# Patient Record
Sex: MALE | Race: WHITE | NOT HISPANIC OR LATINO | Employment: OTHER | URBAN - METROPOLITAN AREA
[De-identification: names, ages, dates, MRNs, and addresses within clinical notes are randomized per-mention and may not be internally consistent; named-entity substitution may affect disease eponyms.]

---

## 2021-02-08 ENCOUNTER — OFFICE VISIT (OUTPATIENT)
Dept: GASTROENTEROLOGY | Facility: CLINIC | Age: 71
End: 2021-02-08
Payer: OTHER GOVERNMENT

## 2021-02-08 ENCOUNTER — TELEPHONE (OUTPATIENT)
Dept: GASTROENTEROLOGY | Facility: CLINIC | Age: 71
End: 2021-02-08

## 2021-02-08 VITALS
DIASTOLIC BLOOD PRESSURE: 90 MMHG | WEIGHT: 314.13 LBS | HEART RATE: 91 BPM | HEIGHT: 71 IN | SYSTOLIC BLOOD PRESSURE: 134 MMHG | OXYGEN SATURATION: 97 % | BODY MASS INDEX: 43.98 KG/M2

## 2021-02-08 DIAGNOSIS — Z86.010 HISTORY OF COLON POLYPS: ICD-10-CM

## 2021-02-08 DIAGNOSIS — Z12.11 COLON CANCER SCREENING: Primary | ICD-10-CM

## 2021-02-08 DIAGNOSIS — Z80.0 FAMILY HISTORY OF COLON CANCER: ICD-10-CM

## 2021-02-08 PROCEDURE — 99999 PR PBB SHADOW E&M-NEW PATIENT-LVL IV: ICD-10-PCS | Mod: PBBFAC,,, | Performed by: NURSE PRACTITIONER

## 2021-02-08 PROCEDURE — 99999 PR PBB SHADOW E&M-NEW PATIENT-LVL IV: CPT | Mod: PBBFAC,,, | Performed by: NURSE PRACTITIONER

## 2021-02-08 PROCEDURE — 99499 UNLISTED E&M SERVICE: CPT | Mod: S$PBB,,, | Performed by: NURSE PRACTITIONER

## 2021-02-08 PROCEDURE — 99499 NO LOS: ICD-10-PCS | Mod: S$PBB,,, | Performed by: NURSE PRACTITIONER

## 2021-02-08 PROCEDURE — 99204 OFFICE O/P NEW MOD 45 MIN: CPT | Mod: PBBFAC | Performed by: NURSE PRACTITIONER

## 2021-02-08 RX ORDER — LOSARTAN POTASSIUM 100 MG/1
100 TABLET ORAL
COMMUNITY
End: 2022-06-30 | Stop reason: SDUPTHER

## 2021-02-08 RX ORDER — ACETAMINOPHEN 500 MG
2 TABLET ORAL
COMMUNITY
End: 2021-02-08 | Stop reason: SDUPTHER

## 2021-02-08 RX ORDER — GABAPENTIN 600 MG/1
600 TABLET ORAL
COMMUNITY

## 2021-02-12 ENCOUNTER — TELEPHONE (OUTPATIENT)
Dept: PREADMISSION TESTING | Facility: HOSPITAL | Age: 71
End: 2021-02-12

## 2021-02-18 ENCOUNTER — ANESTHESIA EVENT (OUTPATIENT)
Dept: ENDOSCOPY | Facility: HOSPITAL | Age: 71
End: 2021-02-18
Payer: OTHER GOVERNMENT

## 2021-02-19 ENCOUNTER — LAB VISIT (OUTPATIENT)
Dept: OTOLARYNGOLOGY | Facility: CLINIC | Age: 71
End: 2021-02-19
Payer: OTHER GOVERNMENT

## 2021-02-19 DIAGNOSIS — Z12.11 COLON CANCER SCREENING: ICD-10-CM

## 2021-02-19 DIAGNOSIS — Z80.0 FAMILY HISTORY OF COLON CANCER: ICD-10-CM

## 2021-02-19 DIAGNOSIS — Z86.010 HISTORY OF COLON POLYPS: ICD-10-CM

## 2021-02-19 PROCEDURE — U0003 INFECTIOUS AGENT DETECTION BY NUCLEIC ACID (DNA OR RNA); SEVERE ACUTE RESPIRATORY SYNDROME CORONAVIRUS 2 (SARS-COV-2) (CORONAVIRUS DISEASE [COVID-19]), AMPLIFIED PROBE TECHNIQUE, MAKING USE OF HIGH THROUGHPUT TECHNOLOGIES AS DESCRIBED BY CMS-2020-01-R: HCPCS

## 2021-02-19 PROCEDURE — U0005 INFEC AGEN DETEC AMPLI PROBE: HCPCS

## 2021-02-20 LAB — SARS-COV-2 RNA RESP QL NAA+PROBE: NOT DETECTED

## 2021-02-22 ENCOUNTER — ANESTHESIA (OUTPATIENT)
Dept: ENDOSCOPY | Facility: HOSPITAL | Age: 71
End: 2021-02-22
Payer: OTHER GOVERNMENT

## 2021-02-22 ENCOUNTER — OFFICE VISIT (OUTPATIENT)
Dept: CARDIOLOGY | Facility: CLINIC | Age: 71
End: 2021-02-22
Attending: INTERNAL MEDICINE
Payer: OTHER GOVERNMENT

## 2021-02-22 ENCOUNTER — HOSPITAL ENCOUNTER (OUTPATIENT)
Facility: HOSPITAL | Age: 71
Discharge: HOME | End: 2021-02-22
Attending: INTERNAL MEDICINE | Admitting: INTERNAL MEDICINE
Payer: OTHER GOVERNMENT

## 2021-02-22 VITALS
TEMPERATURE: 98 F | HEIGHT: 71 IN | RESPIRATION RATE: 16 BRPM | HEART RATE: 76 BPM | SYSTOLIC BLOOD PRESSURE: 151 MMHG | BODY MASS INDEX: 42.28 KG/M2 | DIASTOLIC BLOOD PRESSURE: 91 MMHG | OXYGEN SATURATION: 97 % | WEIGHT: 302 LBS

## 2021-02-22 VITALS
BODY MASS INDEX: 43.82 KG/M2 | HEART RATE: 71 BPM | DIASTOLIC BLOOD PRESSURE: 86 MMHG | OXYGEN SATURATION: 97 % | WEIGHT: 314.13 LBS | SYSTOLIC BLOOD PRESSURE: 128 MMHG

## 2021-02-22 DIAGNOSIS — M79.89 LOCALIZED SWELLING OF BOTH LOWER EXTREMITIES: ICD-10-CM

## 2021-02-22 DIAGNOSIS — R06.01 ORTHOPNEA: ICD-10-CM

## 2021-02-22 DIAGNOSIS — Z86.010 HX OF COLONIC POLYPS: Primary | ICD-10-CM

## 2021-02-22 DIAGNOSIS — E66.01 MORBID OBESITY: ICD-10-CM

## 2021-02-22 DIAGNOSIS — I10 HYPERTENSION, UNSPECIFIED TYPE: ICD-10-CM

## 2021-02-22 DIAGNOSIS — E78.5 HYPERLIPIDEMIA, UNSPECIFIED HYPERLIPIDEMIA TYPE: ICD-10-CM

## 2021-02-22 DIAGNOSIS — R00.0 TACHYCARDIA: ICD-10-CM

## 2021-02-22 DIAGNOSIS — I48.91 ATRIAL FIBRILLATION, UNSPECIFIED TYPE: Primary | ICD-10-CM

## 2021-02-22 DIAGNOSIS — G47.33 OSA (OBSTRUCTIVE SLEEP APNEA): ICD-10-CM

## 2021-02-22 PROCEDURE — 99999 PR PBB SHADOW E&M-EST. PATIENT-LVL III: CPT | Mod: PBBFAC,,, | Performed by: INTERNAL MEDICINE

## 2021-02-22 PROCEDURE — G0105 COLORECTAL SCRN; HI RISK IND: ICD-10-PCS | Mod: ,,, | Performed by: INTERNAL MEDICINE

## 2021-02-22 PROCEDURE — 00812 ANES LWR INTST SCR COLSC: CPT | Performed by: INTERNAL MEDICINE

## 2021-02-22 PROCEDURE — 93005 ELECTROCARDIOGRAM TRACING: CPT

## 2021-02-22 PROCEDURE — 99999 PR PBB SHADOW E&M-EST. PATIENT-LVL III: ICD-10-PCS | Mod: PBBFAC,,, | Performed by: INTERNAL MEDICINE

## 2021-02-22 PROCEDURE — 93010 EKG 12-LEAD: ICD-10-PCS | Mod: ,,, | Performed by: INTERNAL MEDICINE

## 2021-02-22 PROCEDURE — G0105 COLORECTAL SCRN; HI RISK IND: HCPCS | Performed by: INTERNAL MEDICINE

## 2021-02-22 PROCEDURE — G0105 COLORECTAL SCRN; HI RISK IND: HCPCS | Mod: ,,, | Performed by: INTERNAL MEDICINE

## 2021-02-22 PROCEDURE — 63600175 PHARM REV CODE 636 W HCPCS: Performed by: NURSE ANESTHETIST, CERTIFIED REGISTERED

## 2021-02-22 PROCEDURE — 37000008 HC ANESTHESIA 1ST 15 MINUTES: Performed by: INTERNAL MEDICINE

## 2021-02-22 PROCEDURE — 93010 ELECTROCARDIOGRAM REPORT: CPT | Mod: ,,, | Performed by: INTERNAL MEDICINE

## 2021-02-22 PROCEDURE — 37000009 HC ANESTHESIA EA ADD 15 MINS: Performed by: INTERNAL MEDICINE

## 2021-02-22 PROCEDURE — D9220A PRA ANESTHESIA: Mod: ,,, | Performed by: ANESTHESIOLOGY

## 2021-02-22 PROCEDURE — 25000003 PHARM REV CODE 250: Performed by: NURSE ANESTHETIST, CERTIFIED REGISTERED

## 2021-02-22 PROCEDURE — 99204 PR OFFICE/OUTPT VISIT, NEW, LEVL IV, 45-59 MIN: ICD-10-PCS | Mod: S$PBB,,, | Performed by: INTERNAL MEDICINE

## 2021-02-22 PROCEDURE — D9220A PRA ANESTHESIA: ICD-10-PCS | Mod: ,,, | Performed by: ANESTHESIOLOGY

## 2021-02-22 PROCEDURE — 99204 OFFICE O/P NEW MOD 45 MIN: CPT | Mod: S$PBB,,, | Performed by: INTERNAL MEDICINE

## 2021-02-22 PROCEDURE — 99213 OFFICE O/P EST LOW 20 MIN: CPT | Mod: PBBFAC,25 | Performed by: INTERNAL MEDICINE

## 2021-02-22 RX ORDER — LIDOCAINE HYDROCHLORIDE 20 MG/ML
INJECTION, SOLUTION EPIDURAL; INFILTRATION; INTRACAUDAL; PERINEURAL
Status: DISCONTINUED | OUTPATIENT
Start: 2021-02-22 | End: 2021-02-22

## 2021-02-22 RX ORDER — METOPROLOL SUCCINATE 25 MG/1
25 TABLET, EXTENDED RELEASE ORAL DAILY
Qty: 30 TABLET | Refills: 11 | Status: SHIPPED | OUTPATIENT
Start: 2021-02-22 | End: 2021-02-25 | Stop reason: SDUPTHER

## 2021-02-22 RX ORDER — PROPOFOL 10 MG/ML
VIAL (ML) INTRAVENOUS
Status: DISCONTINUED | OUTPATIENT
Start: 2021-02-22 | End: 2021-02-22

## 2021-02-22 RX ORDER — SODIUM CHLORIDE, SODIUM LACTATE, POTASSIUM CHLORIDE, CALCIUM CHLORIDE 600; 310; 30; 20 MG/100ML; MG/100ML; MG/100ML; MG/100ML
INJECTION, SOLUTION INTRAVENOUS CONTINUOUS PRN
Status: DISCONTINUED | OUTPATIENT
Start: 2021-02-22 | End: 2021-02-22

## 2021-02-22 RX ORDER — FUROSEMIDE 20 MG/1
20 TABLET ORAL 2 TIMES DAILY
Qty: 60 TABLET | Refills: 11 | Status: SHIPPED | OUTPATIENT
Start: 2021-02-22 | End: 2021-02-25 | Stop reason: SDUPTHER

## 2021-02-22 RX ORDER — SODIUM CHLORIDE, SODIUM LACTATE, POTASSIUM CHLORIDE, CALCIUM CHLORIDE 600; 310; 30; 20 MG/100ML; MG/100ML; MG/100ML; MG/100ML
INJECTION, SOLUTION INTRAVENOUS CONTINUOUS
Status: DISCONTINUED | OUTPATIENT
Start: 2021-02-22 | End: 2021-02-22 | Stop reason: HOSPADM

## 2021-02-22 RX ORDER — SODIUM CHLORIDE 0.9 % (FLUSH) 0.9 %
10 SYRINGE (ML) INJECTION
Status: DISCONTINUED | OUTPATIENT
Start: 2021-02-22 | End: 2021-02-22 | Stop reason: HOSPADM

## 2021-02-22 RX ADMIN — LIDOCAINE HYDROCHLORIDE 100 MG: 20 INJECTION, SOLUTION EPIDURAL; INFILTRATION; INTRACAUDAL; PERINEURAL at 11:02

## 2021-02-22 RX ADMIN — PROPOFOL 160 MG: 10 INJECTION, EMULSION INTRAVENOUS at 11:02

## 2021-02-22 RX ADMIN — PROPOFOL 40 MG: 10 INJECTION, EMULSION INTRAVENOUS at 11:02

## 2021-02-22 RX ADMIN — PROPOFOL 50 MG: 10 INJECTION, EMULSION INTRAVENOUS at 11:02

## 2021-02-22 RX ADMIN — SODIUM CHLORIDE, SODIUM LACTATE, POTASSIUM CHLORIDE, AND CALCIUM CHLORIDE: 600; 310; 30; 20 INJECTION, SOLUTION INTRAVENOUS at 11:02

## 2021-02-23 ENCOUNTER — PATIENT MESSAGE (OUTPATIENT)
Dept: CARDIOLOGY | Facility: CLINIC | Age: 71
End: 2021-02-23

## 2021-02-25 DIAGNOSIS — I10 HYPERTENSION, UNSPECIFIED TYPE: ICD-10-CM

## 2021-02-25 DIAGNOSIS — M79.89 LOCALIZED SWELLING OF BOTH LOWER EXTREMITIES: ICD-10-CM

## 2021-02-25 DIAGNOSIS — I48.91 ATRIAL FIBRILLATION, UNSPECIFIED TYPE: ICD-10-CM

## 2021-02-25 DIAGNOSIS — R06.01 ORTHOPNEA: ICD-10-CM

## 2021-02-25 RX ORDER — METOPROLOL SUCCINATE 25 MG/1
25 TABLET, EXTENDED RELEASE ORAL DAILY
Qty: 30 TABLET | Refills: 11 | Status: CANCELLED | OUTPATIENT
Start: 2021-02-25 | End: 2022-02-25

## 2021-02-25 RX ORDER — FUROSEMIDE 20 MG/1
20 TABLET ORAL 2 TIMES DAILY
Qty: 60 TABLET | Refills: 11 | Status: CANCELLED | OUTPATIENT
Start: 2021-02-25 | End: 2022-02-25

## 2021-02-25 RX ORDER — FUROSEMIDE 20 MG/1
20 TABLET ORAL 2 TIMES DAILY
Qty: 60 TABLET | Refills: 11 | Status: SHIPPED | OUTPATIENT
Start: 2021-02-25 | End: 2021-09-08 | Stop reason: SDUPTHER

## 2021-02-25 RX ORDER — METOPROLOL SUCCINATE 25 MG/1
25 TABLET, EXTENDED RELEASE ORAL DAILY
Qty: 30 TABLET | Refills: 11 | Status: SHIPPED | OUTPATIENT
Start: 2021-02-25 | End: 2021-09-08 | Stop reason: SDUPTHER

## 2021-03-05 ENCOUNTER — TELEPHONE (OUTPATIENT)
Dept: CARDIOLOGY | Facility: CLINIC | Age: 71
End: 2021-03-05

## 2021-03-15 ENCOUNTER — HOSPITAL ENCOUNTER (OUTPATIENT)
Dept: CARDIOLOGY | Facility: HOSPITAL | Age: 71
Discharge: HOME | End: 2021-03-15
Attending: INTERNAL MEDICINE
Payer: OTHER GOVERNMENT

## 2021-03-15 VITALS — WEIGHT: 314 LBS | BODY MASS INDEX: 43.96 KG/M2 | HEIGHT: 71 IN

## 2021-03-15 DIAGNOSIS — R06.01 ORTHOPNEA: ICD-10-CM

## 2021-03-15 DIAGNOSIS — M79.89 LOCALIZED SWELLING OF BOTH LOWER EXTREMITIES: ICD-10-CM

## 2021-03-15 DIAGNOSIS — I48.91 ATRIAL FIBRILLATION, UNSPECIFIED TYPE: ICD-10-CM

## 2021-03-15 LAB
AORTIC ROOT ANNULUS: 3.65 CM
AV INDEX (PROSTH): 0.88
AV MEAN GRADIENT: 2 MMHG
AV PEAK GRADIENT: 4 MMHG
AV VALVE AREA: 2.77 CM2
AV VELOCITY RATIO: 0.89
BSA FOR ECHO PROCEDURE: 2.67 M2
CV ECHO LV RWT: 0.57 CM
DOP CALC AO PEAK VEL: 1 M/S
DOP CALC AO VTI: 18.53 CM
DOP CALC LVOT AREA: 3.1 CM2
DOP CALC LVOT DIAMETER: 2 CM
DOP CALC LVOT PEAK VEL: 0.89 M/S
DOP CALC LVOT STROKE VOLUME: 51.37 CM3
DOP CALC RVOT PEAK VEL: 0.63 M/S
DOP CALC RVOT VTI: 12.33 CM
DOP CALCLVOT PEAK VEL VTI: 16.36 CM
E WAVE DECELERATION TIME: 168.81 MSEC
ECHO LV POSTERIOR WALL: 1.41 CM (ref 0.6–1.1)
FRACTIONAL SHORTENING: 28 % (ref 28–44)
INTERVENTRICULAR SEPTUM: 1.55 CM (ref 0.6–1.1)
LA MAJOR: 5.65 CM
LA MINOR: 5.27 CM
LA WIDTH: 3.8 CM
LEFT ATRIUM SIZE: 4.92 CM
LEFT ATRIUM VOLUME INDEX: 34 ML/M2
LEFT ATRIUM VOLUME: 86.66 CM3
LEFT INTERNAL DIMENSION IN SYSTOLE: 3.55 CM (ref 2.1–4)
LEFT VENTRICLE DIASTOLIC VOLUME INDEX: 44.99 ML/M2
LEFT VENTRICLE DIASTOLIC VOLUME: 114.73 ML
LEFT VENTRICLE MASS INDEX: 122 G/M2
LEFT VENTRICLE SYSTOLIC VOLUME INDEX: 20.6 ML/M2
LEFT VENTRICLE SYSTOLIC VOLUME: 52.62 ML
LEFT VENTRICULAR INTERNAL DIMENSION IN DIASTOLE: 4.94 CM (ref 3.5–6)
LEFT VENTRICULAR MASS: 310.51 G
LV SEPTAL E/E' RATIO: 8 M/S
MV PEAK E VEL: 0.88 M/S
PISA TR MAX VEL: 2.66 M/S
PV MEAN GRADIENT: 1 MMHG
PV PEAK VELOCITY: 0.58 CM/S
RA MAJOR: 5.68 CM
RA PRESSURE: 3 MMHG
RA WIDTH: 3.99 CM
RIGHT VENTRICULAR END-DIASTOLIC DIMENSION: 3.14 CM
SINUS: 3.18 CM
STJ: 3.22 CM
TDI SEPTAL: 0.11 M/S
TR MAX PG: 28 MMHG
TRICUSPID ANNULAR PLANE SYSTOLIC EXCURSION: 2.22 CM
TV REST PULMONARY ARTERY PRESSURE: 31 MMHG

## 2021-03-15 PROCEDURE — 93306 TTE W/DOPPLER COMPLETE: CPT | Mod: 26,,, | Performed by: INTERNAL MEDICINE

## 2021-03-15 PROCEDURE — 93306 ECHO (CUPID ONLY): ICD-10-PCS | Mod: 26,,, | Performed by: INTERNAL MEDICINE

## 2021-03-15 PROCEDURE — 93306 TTE W/DOPPLER COMPLETE: CPT

## 2021-03-16 ENCOUNTER — PATIENT MESSAGE (OUTPATIENT)
Dept: CARDIOLOGY | Facility: CLINIC | Age: 71
End: 2021-03-16

## 2021-03-22 ENCOUNTER — HOSPITAL ENCOUNTER (OUTPATIENT)
Dept: CARDIOLOGY | Facility: HOSPITAL | Age: 71
Discharge: HOME | End: 2021-03-22
Attending: INTERNAL MEDICINE
Payer: OTHER GOVERNMENT

## 2021-03-22 DIAGNOSIS — I48.91 ATRIAL FIBRILLATION, UNSPECIFIED TYPE: ICD-10-CM

## 2021-03-22 PROCEDURE — 93227 XTRNL ECG REC<48 HR R&I: CPT | Mod: ,,, | Performed by: INTERNAL MEDICINE

## 2021-03-22 PROCEDURE — 93225 XTRNL ECG REC<48 HRS REC: CPT

## 2021-03-22 PROCEDURE — 93227 HOLTER MONITOR - 48 HOUR (CUPID ONLY): ICD-10-PCS | Mod: ,,, | Performed by: INTERNAL MEDICINE

## 2021-03-24 LAB
OHS CV EVENT MONITOR DAY: 0
OHS CV HOLTER LENGTH DECIMAL HOURS: 48
OHS CV HOLTER LENGTH HOURS: 48
OHS CV HOLTER LENGTH MINUTES: 0

## 2021-03-25 ENCOUNTER — PATIENT MESSAGE (OUTPATIENT)
Dept: CARDIOLOGY | Facility: CLINIC | Age: 71
End: 2021-03-25

## 2021-03-25 ENCOUNTER — TELEPHONE (OUTPATIENT)
Dept: CARDIOLOGY | Facility: CLINIC | Age: 71
End: 2021-03-25

## 2021-04-06 ENCOUNTER — HOSPITAL ENCOUNTER (OUTPATIENT)
Facility: HOSPITAL | Age: 71
Discharge: HOME | End: 2021-04-06
Attending: INTERNAL MEDICINE | Admitting: INTERNAL MEDICINE
Payer: OTHER GOVERNMENT

## 2021-04-06 ENCOUNTER — ANESTHESIA (OUTPATIENT)
Dept: CARDIOLOGY | Facility: HOSPITAL | Age: 71
End: 2021-04-06
Payer: OTHER GOVERNMENT

## 2021-04-06 ENCOUNTER — ANESTHESIA EVENT (OUTPATIENT)
Dept: CARDIOLOGY | Facility: HOSPITAL | Age: 71
End: 2021-04-06
Payer: OTHER GOVERNMENT

## 2021-04-06 DIAGNOSIS — I48.91 A-FIB: ICD-10-CM

## 2021-04-06 DIAGNOSIS — I48.91 ATRIAL FIBRILLATION STATUS POST CARDIOVERSION: ICD-10-CM

## 2021-04-06 DIAGNOSIS — I48.91 ATRIAL FIBRILLATION, UNSPECIFIED TYPE: ICD-10-CM

## 2021-04-06 LAB
ALBUMIN SERPL BCP-MCNC: 3.9 G/DL (ref 3.5–5.2)
ALP SERPL-CCNC: 52 U/L (ref 55–135)
ALT SERPL W/O P-5'-P-CCNC: 45 U/L (ref 10–44)
ANION GAP SERPL CALC-SCNC: 10 MMOL/L (ref 8–16)
AST SERPL-CCNC: 32 U/L (ref 10–40)
BASOPHILS # BLD AUTO: 0.04 K/UL (ref 0–0.2)
BASOPHILS NFR BLD: 0.9 % (ref 0–1.9)
BILIRUB SERPL-MCNC: 0.7 MG/DL (ref 0.1–1)
BUN SERPL-MCNC: 13 MG/DL (ref 8–23)
CALCIUM SERPL-MCNC: 8.8 MG/DL (ref 8.7–10.5)
CHLORIDE SERPL-SCNC: 109 MMOL/L (ref 95–110)
CO2 SERPL-SCNC: 23 MMOL/L (ref 23–29)
CREAT SERPL-MCNC: 1.2 MG/DL (ref 0.5–1.4)
DIFFERENTIAL METHOD: ABNORMAL
EJECTION FRACTION: 50 %
EOSINOPHIL # BLD AUTO: 0.2 K/UL (ref 0–0.5)
EOSINOPHIL NFR BLD: 3.6 % (ref 0–8)
ERYTHROCYTE [DISTWIDTH] IN BLOOD BY AUTOMATED COUNT: 12.5 % (ref 11.5–14.5)
EST. GFR  (AFRICAN AMERICAN): >60 ML/MIN/1.73 M^2
EST. GFR  (NON AFRICAN AMERICAN): >60 ML/MIN/1.73 M^2
GLUCOSE SERPL-MCNC: 111 MG/DL (ref 70–110)
HCT VFR BLD AUTO: 43.5 % (ref 40–54)
HGB BLD-MCNC: 15.2 G/DL (ref 14–18)
IMM GRANULOCYTES # BLD AUTO: 0.01 K/UL (ref 0–0.04)
IMM GRANULOCYTES NFR BLD AUTO: 0.2 % (ref 0–0.5)
LYMPHOCYTES # BLD AUTO: 1.7 K/UL (ref 1–4.8)
LYMPHOCYTES NFR BLD: 38 % (ref 18–48)
MCH RBC QN AUTO: 33.3 PG (ref 27–31)
MCHC RBC AUTO-ENTMCNC: 34.9 G/DL (ref 32–36)
MCV RBC AUTO: 95 FL (ref 82–98)
MONOCYTES # BLD AUTO: 0.5 K/UL (ref 0.3–1)
MONOCYTES NFR BLD: 12 % (ref 4–15)
NEUTROPHILS # BLD AUTO: 2 K/UL (ref 1.8–7.7)
NEUTROPHILS NFR BLD: 45.3 % (ref 38–73)
NRBC BLD-RTO: 0 /100 WBC
PLATELET # BLD AUTO: 180 K/UL (ref 150–450)
PMV BLD AUTO: 10.3 FL (ref 9.2–12.9)
POTASSIUM SERPL-SCNC: 3.9 MMOL/L (ref 3.5–5.1)
PROT SERPL-MCNC: 6.5 G/DL (ref 6–8.4)
RA PRESSURE: 3 MMHG
RBC # BLD AUTO: 4.56 M/UL (ref 4.6–6.2)
SARS-COV-2 RDRP RESP QL NAA+PROBE: NEGATIVE
SODIUM SERPL-SCNC: 142 MMOL/L (ref 136–145)
WBC # BLD AUTO: 4.4 K/UL (ref 3.9–12.7)

## 2021-04-06 PROCEDURE — 93010 ELECTROCARDIOGRAM REPORT: CPT | Mod: 76,,, | Performed by: INTERNAL MEDICINE

## 2021-04-06 PROCEDURE — 00410 ANES INTEG SYS CONV ARRHYT: CPT | Performed by: INTERNAL MEDICINE

## 2021-04-06 PROCEDURE — 92960 CARDIOVERSION ELECTRIC EXT: CPT | Performed by: INTERNAL MEDICINE

## 2021-04-06 PROCEDURE — 92960 CARDIOVERSION ELECTRIC EXT: CPT | Mod: ,,, | Performed by: INTERNAL MEDICINE

## 2021-04-06 PROCEDURE — 80053 COMPREHEN METABOLIC PANEL: CPT | Performed by: INTERNAL MEDICINE

## 2021-04-06 PROCEDURE — U0002 COVID-19 LAB TEST NON-CDC: HCPCS | Performed by: INTERNAL MEDICINE

## 2021-04-06 PROCEDURE — 99235 PR OBSERV/HOSP SAME DATE,LEVL IV: ICD-10-PCS | Mod: 25,,, | Performed by: INTERNAL MEDICINE

## 2021-04-06 PROCEDURE — 25000003 PHARM REV CODE 250: Performed by: NURSE ANESTHETIST, CERTIFIED REGISTERED

## 2021-04-06 PROCEDURE — 63600175 PHARM REV CODE 636 W HCPCS: Performed by: NURSE ANESTHETIST, CERTIFIED REGISTERED

## 2021-04-06 PROCEDURE — 93005 ELECTROCARDIOGRAM TRACING: CPT | Mod: 59

## 2021-04-06 PROCEDURE — 25000003 PHARM REV CODE 250: Performed by: INTERNAL MEDICINE

## 2021-04-06 PROCEDURE — 85025 COMPLETE CBC W/AUTO DIFF WBC: CPT | Performed by: INTERNAL MEDICINE

## 2021-04-06 PROCEDURE — 37000009 HC ANESTHESIA EA ADD 15 MINS: Performed by: INTERNAL MEDICINE

## 2021-04-06 PROCEDURE — 99235 HOSP IP/OBS SAME DATE MOD 70: CPT | Mod: 25,,, | Performed by: INTERNAL MEDICINE

## 2021-04-06 PROCEDURE — 93010 EKG 12-LEAD: ICD-10-PCS | Mod: ,,, | Performed by: INTERNAL MEDICINE

## 2021-04-06 PROCEDURE — 92960 PR CARDIOVERSION, ELECTIVE;EXTERN: ICD-10-PCS | Mod: ,,, | Performed by: INTERNAL MEDICINE

## 2021-04-06 PROCEDURE — 37000008 HC ANESTHESIA 1ST 15 MINUTES: Performed by: INTERNAL MEDICINE

## 2021-04-06 RX ORDER — LIDOCAINE HYDROCHLORIDE 10 MG/ML
INJECTION, SOLUTION EPIDURAL; INFILTRATION; INTRACAUDAL; PERINEURAL
Status: DISCONTINUED | OUTPATIENT
Start: 2021-04-06 | End: 2021-04-06

## 2021-04-06 RX ORDER — PROPOFOL 10 MG/ML
VIAL (ML) INTRAVENOUS
Status: DISCONTINUED | OUTPATIENT
Start: 2021-04-06 | End: 2021-04-06

## 2021-04-06 RX ORDER — SODIUM CHLORIDE, SODIUM LACTATE, POTASSIUM CHLORIDE, CALCIUM CHLORIDE 600; 310; 30; 20 MG/100ML; MG/100ML; MG/100ML; MG/100ML
INJECTION, SOLUTION INTRAVENOUS CONTINUOUS PRN
Status: DISCONTINUED | OUTPATIENT
Start: 2021-04-06 | End: 2021-04-06

## 2021-04-06 RX ORDER — LIDOCAINE HYDROCHLORIDE 20 MG/ML
SOLUTION OROPHARYNGEAL
Status: DISCONTINUED | OUTPATIENT
Start: 2021-04-06 | End: 2021-04-06 | Stop reason: HOSPADM

## 2021-04-06 RX ORDER — AMIODARONE HYDROCHLORIDE 200 MG/1
TABLET ORAL
Qty: 56 TABLET | Refills: 6 | Status: ON HOLD | OUTPATIENT
Start: 2021-04-06 | End: 2021-06-04 | Stop reason: HOSPADM

## 2021-04-06 RX ADMIN — PROPOFOL 50 MG: 10 INJECTION, EMULSION INTRAVENOUS at 09:04

## 2021-04-06 RX ADMIN — LIDOCAINE HYDROCHLORIDE 50 MG: 10 INJECTION, SOLUTION EPIDURAL; INFILTRATION; INTRACAUDAL; PERINEURAL at 08:04

## 2021-04-06 RX ADMIN — SODIUM CHLORIDE, SODIUM LACTATE, POTASSIUM CHLORIDE, AND CALCIUM CHLORIDE: 600; 310; 30; 20 INJECTION, SOLUTION INTRAVENOUS at 08:04

## 2021-04-06 RX ADMIN — PROPOFOL 100 MG: 10 INJECTION, EMULSION INTRAVENOUS at 08:04

## 2021-04-06 RX ADMIN — PROPOFOL 50 MG: 10 INJECTION, EMULSION INTRAVENOUS at 08:04

## 2021-04-06 RX ADMIN — APIXABAN 5 MG: 2.5 TABLET, FILM COATED ORAL at 08:04

## 2021-04-07 ENCOUNTER — TELEPHONE (OUTPATIENT)
Dept: CARDIOLOGY | Facility: CLINIC | Age: 71
End: 2021-04-07

## 2021-04-08 VITALS
RESPIRATION RATE: 14 BRPM | DIASTOLIC BLOOD PRESSURE: 77 MMHG | WEIGHT: 300 LBS | SYSTOLIC BLOOD PRESSURE: 130 MMHG | OXYGEN SATURATION: 98 % | HEART RATE: 61 BPM | TEMPERATURE: 98 F | HEIGHT: 71 IN | BODY MASS INDEX: 42 KG/M2

## 2021-04-28 ENCOUNTER — PATIENT MESSAGE (OUTPATIENT)
Dept: RESEARCH | Facility: HOSPITAL | Age: 71
End: 2021-04-28

## 2021-05-10 ENCOUNTER — OFFICE VISIT (OUTPATIENT)
Dept: CARDIOLOGY | Facility: CLINIC | Age: 71
End: 2021-05-10
Payer: OTHER GOVERNMENT

## 2021-05-10 ENCOUNTER — HOSPITAL ENCOUNTER (OUTPATIENT)
Dept: CARDIOLOGY | Facility: HOSPITAL | Age: 71
Discharge: HOME | End: 2021-05-10
Attending: INTERNAL MEDICINE
Payer: OTHER GOVERNMENT

## 2021-05-10 VITALS
BODY MASS INDEX: 42.37 KG/M2 | HEART RATE: 60 BPM | DIASTOLIC BLOOD PRESSURE: 80 MMHG | SYSTOLIC BLOOD PRESSURE: 134 MMHG | WEIGHT: 303.81 LBS | OXYGEN SATURATION: 98 %

## 2021-05-10 DIAGNOSIS — E78.5 HYPERLIPIDEMIA, UNSPECIFIED HYPERLIPIDEMIA TYPE: ICD-10-CM

## 2021-05-10 DIAGNOSIS — I48.19 PERSISTENT ATRIAL FIBRILLATION: Primary | ICD-10-CM

## 2021-05-10 DIAGNOSIS — E66.01 MORBID OBESITY: ICD-10-CM

## 2021-05-10 DIAGNOSIS — I20.89 ANGINAL EQUIVALENT: ICD-10-CM

## 2021-05-10 DIAGNOSIS — R06.09 DOE (DYSPNEA ON EXERTION): ICD-10-CM

## 2021-05-10 DIAGNOSIS — I10 HYPERTENSION, UNSPECIFIED TYPE: ICD-10-CM

## 2021-05-10 DIAGNOSIS — G47.33 OSA (OBSTRUCTIVE SLEEP APNEA): ICD-10-CM

## 2021-05-10 PROCEDURE — 99215 OFFICE O/P EST HI 40 MIN: CPT | Mod: S$PBB,,, | Performed by: INTERNAL MEDICINE

## 2021-05-10 PROCEDURE — 99215 PR OFFICE/OUTPT VISIT, EST, LEVL V, 40-54 MIN: ICD-10-PCS | Mod: S$PBB,,, | Performed by: INTERNAL MEDICINE

## 2021-05-10 PROCEDURE — 93010 ELECTROCARDIOGRAM REPORT: CPT | Mod: ,,, | Performed by: INTERNAL MEDICINE

## 2021-05-10 PROCEDURE — 99213 OFFICE O/P EST LOW 20 MIN: CPT | Mod: PBBFAC | Performed by: INTERNAL MEDICINE

## 2021-05-10 PROCEDURE — 93005 ELECTROCARDIOGRAM TRACING: CPT

## 2021-05-10 PROCEDURE — 93010 EKG 12-LEAD: ICD-10-PCS | Mod: ,,, | Performed by: INTERNAL MEDICINE

## 2021-05-10 PROCEDURE — 99999 PR PBB SHADOW E&M-EST. PATIENT-LVL III: CPT | Mod: PBBFAC,,, | Performed by: INTERNAL MEDICINE

## 2021-05-10 PROCEDURE — 99999 PR PBB SHADOW E&M-EST. PATIENT-LVL III: ICD-10-PCS | Mod: PBBFAC,,, | Performed by: INTERNAL MEDICINE

## 2021-05-19 ENCOUNTER — HOSPITAL ENCOUNTER (OUTPATIENT)
Dept: CARDIOLOGY | Facility: HOSPITAL | Age: 71
Discharge: HOME | End: 2021-05-19
Attending: INTERNAL MEDICINE
Payer: OTHER GOVERNMENT

## 2021-05-19 ENCOUNTER — HOSPITAL ENCOUNTER (OUTPATIENT)
Dept: RADIOLOGY | Facility: HOSPITAL | Age: 71
Discharge: HOME | End: 2021-05-19
Attending: INTERNAL MEDICINE
Payer: OTHER GOVERNMENT

## 2021-05-19 DIAGNOSIS — R06.09 DOE (DYSPNEA ON EXERTION): ICD-10-CM

## 2021-05-19 DIAGNOSIS — I20.89 ANGINAL EQUIVALENT: ICD-10-CM

## 2021-05-19 LAB
CV STRESS BASE HR: 56 BPM
DIASTOLIC BLOOD PRESSURE: 71 MMHG
NUC REST EJECTION FRACTION: 72
OHS CV CPX 1 MINUTE RECOVERY HEART RATE: 60 BPM
OHS CV CPX 85 PERCENT MAX PREDICTED HEART RATE MALE: 127
OHS CV CPX ESTIMATED METS: 1
OHS CV CPX MAX PREDICTED HEART RATE: 149
OHS CV CPX PATIENT IS FEMALE: 0
OHS CV CPX PATIENT IS MALE: 1
OHS CV CPX PEAK DIASTOLIC BLOOD PRESSURE: 71 MMHG
OHS CV CPX PEAK HEAR RATE: 67 BPM
OHS CV CPX PEAK RATE PRESSURE PRODUCT: 8442
OHS CV CPX PEAK SYSTOLIC BLOOD PRESSURE: 126 MMHG
OHS CV CPX PERCENT MAX PREDICTED HEART RATE ACHIEVED: 45
OHS CV CPX RATE PRESSURE PRODUCT PRESENTING: 7056
SYSTOLIC BLOOD PRESSURE: 126 MMHG

## 2021-05-19 PROCEDURE — A9502 TC99M TETROFOSMIN: HCPCS

## 2021-05-19 PROCEDURE — 93016 CV STRESS TEST SUPVJ ONLY: CPT | Mod: ,,, | Performed by: INTERNAL MEDICINE

## 2021-05-19 PROCEDURE — 78452 HT MUSCLE IMAGE SPECT MULT: CPT

## 2021-05-19 PROCEDURE — 93017 CV STRESS TEST TRACING ONLY: CPT

## 2021-05-19 PROCEDURE — 78452 STRESS TEST WITH MYOCARDIAL PERFUSION (CUPID ONLY): ICD-10-PCS | Mod: 26,,, | Performed by: INTERNAL MEDICINE

## 2021-05-19 PROCEDURE — 93018 STRESS TEST WITH MYOCARDIAL PERFUSION (CUPID ONLY): ICD-10-PCS | Mod: ,,, | Performed by: INTERNAL MEDICINE

## 2021-05-19 PROCEDURE — 93018 CV STRESS TEST I&R ONLY: CPT | Mod: ,,, | Performed by: INTERNAL MEDICINE

## 2021-05-19 PROCEDURE — 78452 HT MUSCLE IMAGE SPECT MULT: CPT | Mod: 26,,, | Performed by: INTERNAL MEDICINE

## 2021-05-19 PROCEDURE — 93016 STRESS TEST WITH MYOCARDIAL PERFUSION (CUPID ONLY): ICD-10-PCS | Mod: ,,, | Performed by: INTERNAL MEDICINE

## 2021-05-19 PROCEDURE — 63600175 PHARM REV CODE 636 W HCPCS: Performed by: INTERNAL MEDICINE

## 2021-05-19 RX ORDER — REGADENOSON 0.08 MG/ML
0.4 INJECTION, SOLUTION INTRAVENOUS ONCE
Status: COMPLETED | OUTPATIENT
Start: 2021-05-19 | End: 2021-05-19

## 2021-05-19 RX ADMIN — REGADENOSON 0.4 MG: 0.08 INJECTION, SOLUTION INTRAVENOUS at 01:05

## 2021-05-20 ENCOUNTER — TELEPHONE (OUTPATIENT)
Dept: CARDIOLOGY | Facility: CLINIC | Age: 71
End: 2021-05-20

## 2021-05-24 ENCOUNTER — TELEPHONE (OUTPATIENT)
Dept: CARDIOLOGY | Facility: CLINIC | Age: 71
End: 2021-05-24

## 2021-05-24 DIAGNOSIS — Z86.010 HX OF COLONIC POLYPS: Primary | ICD-10-CM

## 2021-05-24 DIAGNOSIS — I48.11 LONGSTANDING PERSISTENT ATRIAL FIBRILLATION: ICD-10-CM

## 2021-06-01 ENCOUNTER — LAB VISIT (OUTPATIENT)
Dept: LAB | Facility: HOSPITAL | Age: 71
End: 2021-06-01
Attending: INTERNAL MEDICINE
Payer: OTHER GOVERNMENT

## 2021-06-01 DIAGNOSIS — Z86.010 HX OF COLONIC POLYPS: ICD-10-CM

## 2021-06-01 DIAGNOSIS — I48.11 LONGSTANDING PERSISTENT ATRIAL FIBRILLATION: ICD-10-CM

## 2021-06-01 LAB
APTT BLDCRRT: 26 SEC (ref 21–32)
BASOPHILS # BLD AUTO: 0.04 K/UL (ref 0–0.2)
BASOPHILS NFR BLD: 0.7 % (ref 0–1.9)
DIFFERENTIAL METHOD: ABNORMAL
EOSINOPHIL # BLD AUTO: 0.2 K/UL (ref 0–0.5)
EOSINOPHIL NFR BLD: 2.7 % (ref 0–8)
ERYTHROCYTE [DISTWIDTH] IN BLOOD BY AUTOMATED COUNT: 12.6 % (ref 11.5–14.5)
HCT VFR BLD AUTO: 43.2 % (ref 40–54)
HGB BLD-MCNC: 15.2 G/DL (ref 14–18)
IMM GRANULOCYTES # BLD AUTO: 0.02 K/UL (ref 0–0.04)
IMM GRANULOCYTES NFR BLD AUTO: 0.3 % (ref 0–0.5)
INR PPP: 1 (ref 0.8–1.2)
LYMPHOCYTES # BLD AUTO: 1.7 K/UL (ref 1–4.8)
LYMPHOCYTES NFR BLD: 28.5 % (ref 18–48)
MCH RBC QN AUTO: 33.6 PG (ref 27–31)
MCHC RBC AUTO-ENTMCNC: 35.2 G/DL (ref 32–36)
MCV RBC AUTO: 96 FL (ref 82–98)
MONOCYTES # BLD AUTO: 0.6 K/UL (ref 0.3–1)
MONOCYTES NFR BLD: 9.2 % (ref 4–15)
NEUTROPHILS # BLD AUTO: 3.5 K/UL (ref 1.8–7.7)
NEUTROPHILS NFR BLD: 58.6 % (ref 38–73)
NRBC BLD-RTO: 0 /100 WBC
PLATELET # BLD AUTO: 212 K/UL (ref 150–450)
PMV BLD AUTO: 11.6 FL (ref 9.2–12.9)
PROTHROMBIN TIME: 10.8 SEC (ref 9–12.5)
RBC # BLD AUTO: 4.52 M/UL (ref 4.6–6.2)
WBC # BLD AUTO: 6 K/UL (ref 3.9–12.7)

## 2021-06-01 PROCEDURE — 85025 COMPLETE CBC W/AUTO DIFF WBC: CPT | Performed by: INTERNAL MEDICINE

## 2021-06-01 PROCEDURE — 85610 PROTHROMBIN TIME: CPT | Performed by: INTERNAL MEDICINE

## 2021-06-01 PROCEDURE — 80048 BASIC METABOLIC PNL TOTAL CA: CPT | Performed by: INTERNAL MEDICINE

## 2021-06-01 PROCEDURE — 85730 THROMBOPLASTIN TIME PARTIAL: CPT | Performed by: INTERNAL MEDICINE

## 2021-06-01 PROCEDURE — 36415 COLL VENOUS BLD VENIPUNCTURE: CPT | Performed by: INTERNAL MEDICINE

## 2021-06-02 LAB
ANION GAP SERPL CALC-SCNC: 15 MMOL/L (ref 8–16)
BUN SERPL-MCNC: 17 MG/DL (ref 8–23)
CALCIUM SERPL-MCNC: 9.4 MG/DL (ref 8.7–10.5)
CHLORIDE SERPL-SCNC: 108 MMOL/L (ref 95–110)
CO2 SERPL-SCNC: 20 MMOL/L (ref 23–29)
CREAT SERPL-MCNC: 1.3 MG/DL (ref 0.5–1.4)
EST. GFR  (AFRICAN AMERICAN): >60 ML/MIN/1.73 M^2
EST. GFR  (NON AFRICAN AMERICAN): 54.9 ML/MIN/1.73 M^2
GLUCOSE SERPL-MCNC: 122 MG/DL (ref 70–110)
POTASSIUM SERPL-SCNC: 3.8 MMOL/L (ref 3.5–5.1)
SODIUM SERPL-SCNC: 143 MMOL/L (ref 136–145)

## 2021-06-04 ENCOUNTER — HOSPITAL ENCOUNTER (OUTPATIENT)
Facility: HOSPITAL | Age: 71
Discharge: HOME | End: 2021-06-04
Attending: INTERNAL MEDICINE | Admitting: INTERNAL MEDICINE
Payer: OTHER GOVERNMENT

## 2021-06-04 DIAGNOSIS — R94.39 ABNORMAL STRESS TEST: ICD-10-CM

## 2021-06-04 DIAGNOSIS — I20.89 ANGINAL EQUIVALENT: ICD-10-CM

## 2021-06-04 DIAGNOSIS — I48.91 ATRIAL FIBRILLATION, UNSPECIFIED TYPE: ICD-10-CM

## 2021-06-04 LAB
DELSYS: ABNORMAL
HCO3 UR-SCNC: 29.3 MMOL/L (ref 24–28)
MODE: ABNORMAL
PCO2 BLDA: 52.8 MMHG (ref 35–45)
PH SMN: 7.35 [PH] (ref 7.35–7.45)
PO2 BLDA: 42 MMHG (ref 40–60)
POC BE: 4 MMOL/L
POC SATURATED O2: 75 % (ref 95–100)
SAMPLE: ABNORMAL

## 2021-06-04 PROCEDURE — 27201423 OPTIME MED/SURG SUP & DEVICES STERILE SUPPLY: Performed by: INTERNAL MEDICINE

## 2021-06-04 PROCEDURE — 82803 BLOOD GASES ANY COMBINATION: CPT

## 2021-06-04 PROCEDURE — C1894 INTRO/SHEATH, NON-LASER: HCPCS | Performed by: INTERNAL MEDICINE

## 2021-06-04 PROCEDURE — 99152 MOD SED SAME PHYS/QHP 5/>YRS: CPT | Performed by: INTERNAL MEDICINE

## 2021-06-04 PROCEDURE — 99900035 HC TECH TIME PER 15 MIN (STAT)

## 2021-06-04 PROCEDURE — 99152 PR MOD CONSCIOUS SEDATION, SAME PHYS, 5+ YRS, FIRST 15 MIN: ICD-10-PCS | Mod: ,,, | Performed by: INTERNAL MEDICINE

## 2021-06-04 PROCEDURE — 93460 PR CATH PLACE/CORON ANGIO, IMG SUPER/INTERP,R&L HRT CATH, L HRT VENTRIC: ICD-10-PCS | Mod: 26,,, | Performed by: INTERNAL MEDICINE

## 2021-06-04 PROCEDURE — 99499 UNLISTED E&M SERVICE: CPT | Mod: ,,, | Performed by: INTERNAL MEDICINE

## 2021-06-04 PROCEDURE — C1887 CATHETER, GUIDING: HCPCS | Performed by: INTERNAL MEDICINE

## 2021-06-04 PROCEDURE — 25000003 PHARM REV CODE 250: Performed by: INTERNAL MEDICINE

## 2021-06-04 PROCEDURE — 93460 R&L HRT ART/VENTRICLE ANGIO: CPT | Mod: 26,,, | Performed by: INTERNAL MEDICINE

## 2021-06-04 PROCEDURE — 99153 MOD SED SAME PHYS/QHP EA: CPT | Performed by: INTERNAL MEDICINE

## 2021-06-04 PROCEDURE — C1769 GUIDE WIRE: HCPCS | Performed by: INTERNAL MEDICINE

## 2021-06-04 PROCEDURE — 93460 R&L HRT ART/VENTRICLE ANGIO: CPT | Performed by: INTERNAL MEDICINE

## 2021-06-04 PROCEDURE — 99152 MOD SED SAME PHYS/QHP 5/>YRS: CPT | Mod: ,,, | Performed by: INTERNAL MEDICINE

## 2021-06-04 PROCEDURE — 63600175 PHARM REV CODE 636 W HCPCS: Performed by: INTERNAL MEDICINE

## 2021-06-04 PROCEDURE — C1751 CATH, INF, PER/CENT/MIDLINE: HCPCS | Performed by: INTERNAL MEDICINE

## 2021-06-04 PROCEDURE — 99499 NO LOS: ICD-10-PCS | Mod: ,,, | Performed by: INTERNAL MEDICINE

## 2021-06-04 RX ORDER — FENTANYL CITRATE 50 UG/ML
INJECTION, SOLUTION INTRAMUSCULAR; INTRAVENOUS
Status: DISCONTINUED | OUTPATIENT
Start: 2021-06-04 | End: 2021-06-04 | Stop reason: HOSPADM

## 2021-06-04 RX ORDER — MIDAZOLAM HYDROCHLORIDE 1 MG/ML
INJECTION, SOLUTION INTRAMUSCULAR; INTRAVENOUS
Status: DISCONTINUED | OUTPATIENT
Start: 2021-06-04 | End: 2021-06-04 | Stop reason: HOSPADM

## 2021-06-04 RX ORDER — ACETAMINOPHEN 325 MG/1
650 TABLET ORAL EVERY 4 HOURS PRN
Status: DISCONTINUED | OUTPATIENT
Start: 2021-06-04 | End: 2021-06-04 | Stop reason: HOSPADM

## 2021-06-04 RX ORDER — SODIUM CHLORIDE 9 MG/ML
INJECTION, SOLUTION INTRAVENOUS CONTINUOUS
Status: ACTIVE | OUTPATIENT
Start: 2021-06-04 | End: 2021-06-04

## 2021-06-04 RX ORDER — AMIODARONE HYDROCHLORIDE 200 MG/1
200 TABLET ORAL DAILY
Qty: 30 TABLET | Refills: 11 | Status: SHIPPED | OUTPATIENT
Start: 2021-06-04 | End: 2022-12-13

## 2021-06-04 RX ORDER — DIPHENHYDRAMINE HCL 50 MG
50 CAPSULE ORAL ONCE
Status: COMPLETED | OUTPATIENT
Start: 2021-06-04 | End: 2021-06-04

## 2021-06-04 RX ORDER — LIDOCAINE HYDROCHLORIDE 20 MG/ML
INJECTION, SOLUTION EPIDURAL; INFILTRATION; INTRACAUDAL; PERINEURAL
Status: DISCONTINUED | OUTPATIENT
Start: 2021-06-04 | End: 2021-06-04 | Stop reason: HOSPADM

## 2021-06-04 RX ORDER — NITROGLYCERIN 20 MG/100ML
INJECTION INTRAVENOUS
Status: DISCONTINUED | OUTPATIENT
Start: 2021-06-04 | End: 2021-06-04 | Stop reason: HOSPADM

## 2021-06-04 RX ORDER — HEPARIN SODIUM 1000 [USP'U]/ML
INJECTION, SOLUTION INTRAVENOUS; SUBCUTANEOUS
Status: DISCONTINUED | OUTPATIENT
Start: 2021-06-04 | End: 2021-06-04 | Stop reason: HOSPADM

## 2021-06-04 RX ORDER — ONDANSETRON 8 MG/1
8 TABLET, ORALLY DISINTEGRATING ORAL EVERY 8 HOURS PRN
Status: DISCONTINUED | OUTPATIENT
Start: 2021-06-04 | End: 2021-06-04 | Stop reason: HOSPADM

## 2021-06-04 RX ORDER — NAPROXEN SODIUM 220 MG/1
81 TABLET, FILM COATED ORAL ONCE
Status: COMPLETED | OUTPATIENT
Start: 2021-06-04 | End: 2021-06-04

## 2021-06-04 RX ORDER — FUROSEMIDE 40 MG/1
40 TABLET ORAL 2 TIMES DAILY
Qty: 60 TABLET | Refills: 11 | Status: SHIPPED | OUTPATIENT
Start: 2021-06-04 | End: 2022-01-03

## 2021-06-04 RX ADMIN — SODIUM CHLORIDE: 0.9 INJECTION, SOLUTION INTRAVENOUS at 07:06

## 2021-06-04 RX ADMIN — DIPHENHYDRAMINE HYDROCHLORIDE 50 MG: 50 CAPSULE ORAL at 07:06

## 2021-06-04 RX ADMIN — ASPIRIN 81 MG: 81 TABLET, CHEWABLE ORAL at 07:06

## 2021-06-07 VITALS
RESPIRATION RATE: 17 BRPM | OXYGEN SATURATION: 95 % | TEMPERATURE: 98 F | DIASTOLIC BLOOD PRESSURE: 83 MMHG | HEIGHT: 71 IN | HEART RATE: 57 BPM | BODY MASS INDEX: 42 KG/M2 | WEIGHT: 300 LBS | SYSTOLIC BLOOD PRESSURE: 142 MMHG

## 2021-06-10 ENCOUNTER — OFFICE VISIT (OUTPATIENT)
Dept: CARDIOLOGY | Facility: CLINIC | Age: 71
End: 2021-06-10
Payer: OTHER GOVERNMENT

## 2021-06-10 VITALS
BODY MASS INDEX: 42.4 KG/M2 | HEART RATE: 75 BPM | WEIGHT: 304 LBS | DIASTOLIC BLOOD PRESSURE: 84 MMHG | SYSTOLIC BLOOD PRESSURE: 134 MMHG | OXYGEN SATURATION: 97 %

## 2021-06-10 DIAGNOSIS — G47.00 INSOMNIA, UNSPECIFIED TYPE: ICD-10-CM

## 2021-06-10 DIAGNOSIS — I10 HYPERTENSION, UNSPECIFIED TYPE: ICD-10-CM

## 2021-06-10 DIAGNOSIS — Z79.899 LONG TERM CURRENT USE OF AMIODARONE: ICD-10-CM

## 2021-06-10 DIAGNOSIS — G47.33 OSA (OBSTRUCTIVE SLEEP APNEA): ICD-10-CM

## 2021-06-10 DIAGNOSIS — R06.09 DOE (DYSPNEA ON EXERTION): Primary | ICD-10-CM

## 2021-06-10 DIAGNOSIS — Z87.891 EX-SMOKER: ICD-10-CM

## 2021-06-10 DIAGNOSIS — E66.01 MORBID OBESITY: ICD-10-CM

## 2021-06-10 DIAGNOSIS — I48.91 ATRIAL FIBRILLATION, UNSPECIFIED TYPE: ICD-10-CM

## 2021-06-10 PROCEDURE — 99213 OFFICE O/P EST LOW 20 MIN: CPT | Mod: PBBFAC | Performed by: INTERNAL MEDICINE

## 2021-06-10 PROCEDURE — 99999 PR PBB SHADOW E&M-EST. PATIENT-LVL III: ICD-10-PCS | Mod: PBBFAC,,, | Performed by: INTERNAL MEDICINE

## 2021-06-10 PROCEDURE — 99999 PR PBB SHADOW E&M-EST. PATIENT-LVL III: CPT | Mod: PBBFAC,,, | Performed by: INTERNAL MEDICINE

## 2021-06-10 PROCEDURE — 99213 PR OFFICE/OUTPT VISIT, EST, LEVL III, 20-29 MIN: ICD-10-PCS | Mod: S$PBB,,, | Performed by: INTERNAL MEDICINE

## 2021-06-10 PROCEDURE — 99213 OFFICE O/P EST LOW 20 MIN: CPT | Mod: S$PBB,,, | Performed by: INTERNAL MEDICINE

## 2021-06-10 RX ORDER — ZOLPIDEM TARTRATE 5 MG/1
5 TABLET ORAL NIGHTLY PRN
Qty: 30 TABLET | Refills: 5 | Status: SHIPPED | OUTPATIENT
Start: 2021-06-10 | End: 2021-06-10

## 2021-06-10 RX ORDER — ZOLPIDEM TARTRATE 5 MG/1
5 TABLET ORAL NIGHTLY PRN
Qty: 30 TABLET | Refills: 5 | Status: SHIPPED | OUTPATIENT
Start: 2021-06-10 | End: 2024-03-07

## 2021-06-11 ENCOUNTER — TELEPHONE (OUTPATIENT)
Dept: CARDIOLOGY | Facility: CLINIC | Age: 71
End: 2021-06-11

## 2021-06-11 DIAGNOSIS — Z20.822 ENCOUNTER FOR LABORATORY TESTING FOR COVID-19 VIRUS: ICD-10-CM

## 2021-07-05 ENCOUNTER — PATIENT MESSAGE (OUTPATIENT)
Dept: CARDIOLOGY | Facility: CLINIC | Age: 71
End: 2021-07-05

## 2021-08-19 ENCOUNTER — TELEPHONE (OUTPATIENT)
Dept: RADIOLOGY | Facility: HOSPITAL | Age: 71
End: 2021-08-19

## 2021-08-20 ENCOUNTER — HOSPITAL ENCOUNTER (OUTPATIENT)
Dept: RADIOLOGY | Facility: HOSPITAL | Age: 71
Discharge: HOME | End: 2021-08-20
Payer: OTHER GOVERNMENT

## 2021-08-20 DIAGNOSIS — Z01.89 ENCOUNTER FOR OTHER SPECIFIED SPECIAL EXAMINATIONS: ICD-10-CM

## 2021-08-20 PROCEDURE — 76775 US EXAM ABDO BACK WALL LIM: CPT | Mod: 26,,, | Performed by: RADIOLOGY

## 2021-08-20 PROCEDURE — 76775 US ABDOMINAL AORTA: ICD-10-PCS | Mod: 26,,, | Performed by: RADIOLOGY

## 2021-08-20 PROCEDURE — 76775 US EXAM ABDO BACK WALL LIM: CPT | Mod: TC

## 2021-09-07 ENCOUNTER — PATIENT MESSAGE (OUTPATIENT)
Dept: CARDIOLOGY | Facility: CLINIC | Age: 71
End: 2021-09-07

## 2021-09-07 DIAGNOSIS — I10 HYPERTENSION, UNSPECIFIED TYPE: ICD-10-CM

## 2021-09-07 DIAGNOSIS — M79.89 LOCALIZED SWELLING OF BOTH LOWER EXTREMITIES: ICD-10-CM

## 2021-09-07 DIAGNOSIS — R06.01 ORTHOPNEA: ICD-10-CM

## 2021-09-07 DIAGNOSIS — I48.91 ATRIAL FIBRILLATION, UNSPECIFIED TYPE: ICD-10-CM

## 2021-09-15 ENCOUNTER — PATIENT MESSAGE (OUTPATIENT)
Dept: PULMONOLOGY | Facility: CLINIC | Age: 71
End: 2021-09-15

## 2021-09-20 ENCOUNTER — TELEPHONE (OUTPATIENT)
Dept: PULMONOLOGY | Facility: CLINIC | Age: 71
End: 2021-09-20

## 2021-09-23 ENCOUNTER — TELEPHONE (OUTPATIENT)
Dept: PULMONOLOGY | Facility: CLINIC | Age: 71
End: 2021-09-23

## 2022-01-03 ENCOUNTER — LAB VISIT (OUTPATIENT)
Dept: LAB | Facility: HOSPITAL | Age: 72
End: 2022-01-03
Attending: INTERNAL MEDICINE
Payer: OTHER GOVERNMENT

## 2022-01-03 ENCOUNTER — OFFICE VISIT (OUTPATIENT)
Dept: CARDIOLOGY | Facility: CLINIC | Age: 72
End: 2022-01-03
Payer: OTHER GOVERNMENT

## 2022-01-03 VITALS
SYSTOLIC BLOOD PRESSURE: 153 MMHG | DIASTOLIC BLOOD PRESSURE: 96 MMHG | WEIGHT: 315 LBS | HEART RATE: 79 BPM | BODY MASS INDEX: 44.06 KG/M2

## 2022-01-03 DIAGNOSIS — I10 HYPERTENSION, UNSPECIFIED TYPE: ICD-10-CM

## 2022-01-03 DIAGNOSIS — R06.09 DOE (DYSPNEA ON EXERTION): ICD-10-CM

## 2022-01-03 DIAGNOSIS — E66.01 MORBID OBESITY: ICD-10-CM

## 2022-01-03 DIAGNOSIS — R06.09 DOE (DYSPNEA ON EXERTION): Primary | ICD-10-CM

## 2022-01-03 PROCEDURE — 99213 OFFICE O/P EST LOW 20 MIN: CPT | Mod: PBBFAC | Performed by: INTERNAL MEDICINE

## 2022-01-03 PROCEDURE — 80048 BASIC METABOLIC PNL TOTAL CA: CPT | Performed by: INTERNAL MEDICINE

## 2022-01-03 PROCEDURE — 99999 PR PBB SHADOW E&M-EST. PATIENT-LVL III: ICD-10-PCS | Mod: PBBFAC,,, | Performed by: INTERNAL MEDICINE

## 2022-01-03 PROCEDURE — 36415 COLL VENOUS BLD VENIPUNCTURE: CPT | Performed by: INTERNAL MEDICINE

## 2022-01-03 PROCEDURE — 99999 PR PBB SHADOW E&M-EST. PATIENT-LVL III: CPT | Mod: PBBFAC,,, | Performed by: INTERNAL MEDICINE

## 2022-01-03 PROCEDURE — 99214 PR OFFICE/OUTPT VISIT, EST, LEVL IV, 30-39 MIN: ICD-10-PCS | Mod: S$PBB,,, | Performed by: INTERNAL MEDICINE

## 2022-01-03 PROCEDURE — 99214 OFFICE O/P EST MOD 30 MIN: CPT | Mod: S$PBB,,, | Performed by: INTERNAL MEDICINE

## 2022-01-03 RX ORDER — BISOPROLOL FUMARATE 5 MG/1
5 TABLET, FILM COATED ORAL DAILY
Qty: 30 TABLET | Refills: 11 | OUTPATIENT
Start: 2022-01-03 | End: 2022-01-03 | Stop reason: SDUPTHER

## 2022-01-03 RX ORDER — BISOPROLOL FUMARATE 5 MG/1
5 TABLET, FILM COATED ORAL DAILY
Qty: 30 TABLET | Refills: 11 | Status: SHIPPED | OUTPATIENT
Start: 2022-01-03 | End: 2022-06-30 | Stop reason: SDUPTHER

## 2022-01-03 RX ORDER — FUROSEMIDE 40 MG/1
40 TABLET ORAL 2 TIMES DAILY
Qty: 60 TABLET | Refills: 11 | Status: SHIPPED | OUTPATIENT
Start: 2022-01-03 | End: 2022-06-30 | Stop reason: SDUPTHER

## 2022-01-03 RX ORDER — FUROSEMIDE 40 MG/1
40 TABLET ORAL 2 TIMES DAILY
Qty: 60 TABLET | Refills: 11 | OUTPATIENT
Start: 2022-01-03 | End: 2022-01-03 | Stop reason: SDUPTHER

## 2022-01-03 NOTE — PROGRESS NOTES
Subjective:   Patient ID:  Lucas Garrison is a 71 y.o. male who presents for evaluation of No chief complaint on file.      70-YEAR-OLD MALE WITH HISTORY OF HYPERTENSION, OBESITY, OBSTRUCTIVE SLEEP APNEA COMPLIANT WITH CPAP, HYPERLIPIDEMIA.  PATIENT IS A  [facility]  PATIENT WAS HAVING A ROUTINE COLONOSCOPY FOR SCREENING EARLIER TODAY.  HE WAS CAUGHT TO HAVE AFIB NOT AN RVR.  THE OS HE WAS REFERRED FOR EVALUATION.  HE DENIES ANY PALPITATIONS, SYNCOPE, PRESYNCOPE, DIZZINESS.  HE DOES REPORT ORTHOPNEA, DYSPNEA ON EXERTION AND LOWER EXTREMITY SWELLING.  HE ADMITS TO NO SALT RESTRICTION.  HE DENIES ANY CHEST PAIN, CLAUDICATION, BLEEDING, MELENA OR HEMATEMESIS,  HE STATES THAT HE HAD A NORMAL HEART CATHETERIZATION 10 YEARS AGO    5/10/2021   Feels better but still complains of SINGH , felt better after the mario/dccv  But lately singh again, with mild LE swelling, + mild orthopnea  ekg sinus bradycardia, non specific st t waves   Increase lasix, less salt     6/10/2021   Had abnormal stress test after last visit , cath was normal cors   Mildly elevated left sided pressure   Still feeling dyspnea   euvolemic on exam   No chest pain, in sinus rhythm   No other complaints except for insomnia   Cath site right radial and right antecubital no issues     1.3.2021  DOING WELL.  Denies chest pain.  Still reports dyspnea on exertion however better compared to in the past.  NYHA 1.   Still not completely adherent with low salt  Known to have mildly elevated LVEDP on his catheterization last year.  With normal coronaries.  No palpitations, no bleeding while on Eliquis.  Not on aspirin.  Blood pressure not at goal states he was rushing to the clinic today.  Will switch his Toprol to bisoprolol.  [facility] and he had [facility] as per the patient himself however no documents  He is taking Lasix 40 mg twice a day.  Asking for  a new prescription before he runs  out of the medication  Past Medical History:   Diagnosis Date    Cancer     DDD (degenerative disc disease)     H/O prostate cancer     HLD (hyperlipidemia)     Hypertension     Lumbago     REGINA (obstructive sleep apnea)     Peripheral neuropathy     PTSD (post-traumatic stress disorder)        Past Surgical History:   Procedure Laterality Date    CATHETERIZATION OF BOTH LEFT AND RIGHT HEART N/A 2021    Procedure: CATHETERIZATION, HEART, BOTH LEFT AND RIGHT;  Surgeon: Baldemar Davalos MD;  Location: [facility] CATH LAB;  Service: Cardiology;  Laterality: N/A;    COLONOSCOPY N/A 2016    Procedure: COLONOSCOPY;  Surgeon: Zeus Guerrier MD;  Location: [facility] ENDO;  Service: Endoscopy;  Laterality: N/A;    COLONOSCOPY N/A 2021    Procedure: COLONOSCOPY;  Surgeon: Joselyn Castanon MD;  Location: [facility] ENDO;  Service: Endoscopy;  Laterality: N/A;    CORONARY ANGIOGRAPHY N/A 2021    Procedure: ANGIOGRAM, CORONARY ARTERY;  Surgeon: Baldemar Davalos MD;  Location: [facility] CATH LAB;  Service: Cardiology;  Laterality: N/A;    prostate radiation seeds      RIGHT HEART CATHETERIZATION Right 2021    Procedure: INSERTION, CATHETER, RIGHT HEART;  Surgeon: Baldemar Davalos MD;  Location: [facility] CATH LAB;  Service: Cardiology;  Laterality: Right;    TONSILLECTOMY      TRANSURETHRAL RESECTION OF PROSTATE         Social History     Tobacco Use    Smoking status: Former Smoker     Packs/day: 1.50     Years: 10.00     Pack years: 15.00     Quit date: 10/30/1982     Years since quittin.2   Substance Use Topics    Alcohol use: No    Drug use: No       Family History   Problem Relation Age of Onset    COPD Mother     Depression Brother     Colon cancer Neg Hx        Review of Systems   Constitutional: Negative for fever and malaise/fatigue.   HENT: Negative for sore throat.    Eyes: Negative for blurred vision.   Cardiovascular: Positive for dyspnea on exertion. Negative for chest pain,  claudication, cyanosis, irregular heartbeat, leg swelling, near-syncope, orthopnea, palpitations, paroxysmal nocturnal dyspnea and syncope.   Respiratory: Negative for cough, hemoptysis and shortness of breath.    Hematologic/Lymphatic: Negative for bleeding problem.   Skin: Negative for poor wound healing and rash.   Musculoskeletal: Negative for back pain and falls.   Gastrointestinal: Negative for abdominal pain.   Genitourinary: Negative for nocturia.   Neurological: Negative for dizziness, focal weakness, headaches, light-headedness and loss of balance.   Psychiatric/Behavioral: Negative for altered mental status and substance abuse.       Current Outpatient Medications on File Prior to Visit   Medication Sig    amiodarone (PACERONE) 200 MG Tab Take 1 tablet (200 mg total) by mouth once daily.    amitriptyline (ELAVIL) 50 MG tablet Take 50 mg by mouth every evening.    amlodipine (NORVASC) 10 MG tablet Take 10 mg by mouth once daily.    apixaban (ELIQUIS) 5 mg Tab Take 1 tablet (5 mg total) by mouth 2 (two) times daily.    citalopram (CELEXA) 40 MG tablet Take 40 mg by mouth once daily.    fish oil-omega-3 fatty acids 300-1,000 mg capsule Take 1 g by mouth once daily.    gabapentin (NEURONTIN) 600 MG tablet Take 600 mg by mouth.    losartan (COZAAR) 100 MG tablet Take 100 mg by mouth.    methocarbamol (ROBAXIN) 750 MG Tab Take 500 mg by mouth 3 (three) times daily.    mirtazapine (REMERON) 30 MG tablet Take 30 mg by mouth every evening.    multivitamin capsule Take 1 capsule by mouth once daily.    naproxen (NAPROSYN) 500 MG tablet Take 500 mg by mouth 2 (two) times daily with meals.    omeprazole (PRILOSEC) 20 MG capsule Take 20 mg by mouth once daily.    prazosin (MINIPRESS) 5 MG capsule Take 5 mg by mouth 2 (two) times daily.    [DISCONTINUED] furosemide (LASIX) 20 MG tablet Take 1 tablet (20 mg total) by mouth 2 (two) times daily.    [DISCONTINUED] furosemide (LASIX) 40 MG tablet Take 1  tablet (40 mg total) by mouth 2 (two) times daily.    [DISCONTINUED] metoprolol succinate (TOPROL-XL) 25 MG 24 hr tablet Take 1 tablet (25 mg total) by mouth once daily.    aspirin (ECOTRIN) 81 MG EC tablet Take 81 mg by mouth once daily.    zolpidem (AMBIEN) 5 MG Tab Take 1 tablet (5 mg total) by mouth nightly as needed (INSOMNIA).     No current facility-administered medications on file prior to visit.       Objective:   Objective:  Wt Readings from Last 3 Encounters:   01/03/22 (!) 143.3 kg (315 lb 14.7 oz)   06/10/21 (!) 137.9 kg (304 lb 0.2 oz)   06/04/21 136.1 kg (300 lb)     Temp Readings from Last 3 Encounters:   06/04/21 97.9 °F (36.6 °C)   04/06/21 98.1 °F (36.7 °C)   02/22/21 97.9 °F (36.6 °C) (Temporal)     BP Readings from Last 3 Encounters:   01/03/22 (!) 153/96   06/10/21 134/84   06/04/21 (!) 142/83     Pulse Readings from Last 3 Encounters:   01/03/22 79   06/10/21 75   06/04/21 (!) 57       Physical Exam  Vitals reviewed.   Constitutional:       Appearance: He is well-developed.   HENT:      Head: Normocephalic and atraumatic.   Eyes:      General: No scleral icterus.     Conjunctiva/sclera: Conjunctivae normal.   Cardiovascular:      Rate and Rhythm: Normal rate. Rhythm irregular.      Pulses: Intact distal pulses.      Heart sounds: Normal heart sounds. No murmur heard.      Pulmonary:      Effort: No respiratory distress.      Breath sounds: No wheezing or rales.   Chest:      Chest wall: No tenderness.   Abdominal:      General: Bowel sounds are normal. There is no distension.      Palpations: Abdomen is soft.      Tenderness: There is no guarding.   Musculoskeletal:         General: Normal range of motion.      Cervical back: Normal range of motion and neck supple.   Skin:     General: Skin is warm.   Neurological:      Mental Status: He is alert and oriented to person, place, and time.         No results found for: CHOL  No results found for: HDL  No results found for: LDLCALC  No results  found for: TRIG  No results found for: CHOLHDL    Chemistry        Component Value Date/Time     06/01/2021 1007    K 3.8 06/01/2021 1007     06/01/2021 1007    CO2 20 (L) 06/01/2021 1007    BUN 17 06/01/2021 1007    CREATININE 1.3 06/01/2021 1007     (H) 06/01/2021 1007        Component Value Date/Time    CALCIUM 9.4 06/01/2021 1007    ALKPHOS 52 (L) 04/06/2021 0729    AST 32 04/06/2021 0729    ALT 45 (H) 04/06/2021 0729    BILITOT 0.7 04/06/2021 0729    ESTGFRAFRICA >60.0 06/01/2021 1007    EGFRNONAA 54.9 (A) 06/01/2021 1007          No results found for: TSH  Lab Results   Component Value Date    INR 1.0 06/01/2021     Lab Results   Component Value Date    WBC 6.00 06/01/2021    HGB 15.2 06/01/2021    HCT 43.2 06/01/2021    MCV 96 06/01/2021     06/01/2021     BNP  @LABRCNTIP(BNP,BNPTRIAGEBLO)@  CrCl cannot be calculated (Patient's most recent lab result is older than the maximum 7 days allowed.).     Imaging:  ======  No results found for this or any previous visit.    No results found for this or any previous visit.    No results found for this or any previous visit.    No results found for this or any previous visit.    No results found for this or any previous visit.    No valid procedures specified.    Diagnostic Results:  ECG: Reviewed, nsr after dccv       States he had a normal heart cath in 2010 with Dr Preciado       2021   Left Main   The vessel is large.   Left Anterior Descending   First Diagonal Branch   The vessel is large.   Ramus Intermedius   The vessel is large.   Left Circumflex   The vessel is large.   First Obtuse Marginal Branch   The vessel is large.   Right Coronary Artery   The vessel is large.   Right Posterior Descending Artery   The vessel is large.   Right Posterior Atrioventricular Artery   The vessel is angiographically normal.   Intervention    No interventions have been documented.  Left Heart Findings    Left Ventricle    LVEDP (Pre):15 mmHG   Aortic  Valve    No gradient on pullback   Right Heart Findings    Right Heart Pressures    RA: 8 RV: 38/ 8 PA: 36/ 17/ 22 PWP: 15 .   Cardiac output was 8. Cardiac index is 3.22 L/min/m2.   O2 Sat: PA 75%.       The ASCVD Risk score (Brendastephanie SOTO Jr., et al., 2013) failed to calculate for the following reasons:    Cannot find a previous HDL lab    Cannot find a previous total cholesterol lab    Assessment and Plan:   SINGH (dyspnea on exertion)  -     Discontinue: furosemide (LASIX) 40 MG tablet; Take 1 tablet (40 mg total) by mouth 2 (two) times daily.  Dispense: 60 tablet; Refill: 11    Hypertension, unspecified type  -     Discontinue: bisoprolol (ZEBETA) 5 MG tablet; Take 1 tablet (5 mg total) by mouth once daily.  Dispense: 30 tablet; Refill: 11  -     Basic metabolic panel; Future; Expected date: 01/03/2022    Morbid obesity  -     Basic metabolic panel; Future; Expected date: 01/03/2022      SINGH (dyspnea on exertion)    Atrial fibrillation, unspecified type    Morbid obesity    Hypertension, unspecified type    REGINA (obstructive sleep apnea)    Long term current use of amiodarone  -     Spirometry Without Bronchodilator & DLCO; [facility]    Ex-smoker    ON PRAVASTATIN 80 MG   cw lasix bid   Weight loss and exercise  Low salt diet   In sinus rhythm continues to have SINGH but less severe, needs better adherence with low-salt diet, check BMP  No cad   lvedp 15 mmhg   CW NOAC  Switch Toprol to bisoprolol for better blood pressure control.  Reviewed all tests and above medical conditions with patient in detail and formulated treatment plan.  Risk factor modification discussed.   Cardiac low salt diet discussed.  Maintaining healthy weight and weight loss goals were discussed in clinic.    Follow up in 6 months

## 2022-01-04 DIAGNOSIS — R06.02 SOB (SHORTNESS OF BREATH): Primary | ICD-10-CM

## 2022-01-04 LAB
ANION GAP SERPL CALC-SCNC: 8 MMOL/L (ref 8–16)
BUN SERPL-MCNC: 15 MG/DL (ref 8–23)
CALCIUM SERPL-MCNC: 9.4 MG/DL (ref 8.7–10.5)
CHLORIDE SERPL-SCNC: 107 MMOL/L (ref 95–110)
CO2 SERPL-SCNC: 27 MMOL/L (ref 23–29)
CREAT SERPL-MCNC: 1.2 MG/DL (ref 0.5–1.4)
EST. GFR  (AFRICAN AMERICAN): >60 ML/MIN/1.73 M^2
EST. GFR  (NON AFRICAN AMERICAN): >60 ML/MIN/1.73 M^2
GLUCOSE SERPL-MCNC: 87 MG/DL (ref 70–110)
POTASSIUM SERPL-SCNC: 4.1 MMOL/L (ref 3.5–5.1)
SODIUM SERPL-SCNC: 142 MMOL/L (ref 136–145)

## 2022-01-06 ENCOUNTER — TELEPHONE (OUTPATIENT)
Dept: PULMONOLOGY | Facility: CLINIC | Age: 72
End: 2022-01-06
Payer: OTHER GOVERNMENT

## 2022-01-06 NOTE — TELEPHONE ENCOUNTER
Spoke to pt, pt wanting to [facility] consult. Does not feel the need to be seen. ----- Message from Jennifer Valle sent at 1/6/2022  1:29 PM CST -----  Contact: self  Lucas MARIA May would like a call back at [phone], in regards to discussing his appointment on 01/12/22.

## 2022-06-30 ENCOUNTER — OFFICE VISIT (OUTPATIENT)
Dept: CARDIOLOGY | Facility: CLINIC | Age: 72
End: 2022-06-30
Payer: OTHER GOVERNMENT

## 2022-06-30 VITALS
HEART RATE: 56 BPM | BODY MASS INDEX: 41.67 KG/M2 | DIASTOLIC BLOOD PRESSURE: 74 MMHG | HEIGHT: 71 IN | WEIGHT: 297.63 LBS | SYSTOLIC BLOOD PRESSURE: 112 MMHG | OXYGEN SATURATION: 94 %

## 2022-06-30 DIAGNOSIS — R06.09 DOE (DYSPNEA ON EXERTION): ICD-10-CM

## 2022-06-30 DIAGNOSIS — I10 HYPERTENSION, UNSPECIFIED TYPE: ICD-10-CM

## 2022-06-30 DIAGNOSIS — I48.91 ATRIAL FIBRILLATION, UNSPECIFIED TYPE: ICD-10-CM

## 2022-06-30 PROCEDURE — 99214 OFFICE O/P EST MOD 30 MIN: CPT | Mod: S$PBB,ICN,, | Performed by: INTERNAL MEDICINE

## 2022-06-30 PROCEDURE — 99999 PR PBB SHADOW E&M-EST. PATIENT-LVL III: ICD-10-PCS | Mod: PBBFAC,,, | Performed by: INTERNAL MEDICINE

## 2022-06-30 PROCEDURE — 99213 OFFICE O/P EST LOW 20 MIN: CPT | Mod: PBBFAC | Performed by: INTERNAL MEDICINE

## 2022-06-30 PROCEDURE — 99999 PR PBB SHADOW E&M-EST. PATIENT-LVL III: CPT | Mod: PBBFAC,,, | Performed by: INTERNAL MEDICINE

## 2022-06-30 PROCEDURE — 99214 PR OFFICE/OUTPT VISIT, EST, LEVL IV, 30-39 MIN: ICD-10-PCS | Mod: S$PBB,ICN,, | Performed by: INTERNAL MEDICINE

## 2022-06-30 RX ORDER — LOSARTAN POTASSIUM 100 MG/1
100 TABLET ORAL DAILY
Qty: 90 TABLET | Refills: 3 | Status: SHIPPED | OUTPATIENT
Start: 2022-06-30

## 2022-06-30 RX ORDER — AMLODIPINE BESYLATE 10 MG/1
10 TABLET ORAL DAILY
Qty: 90 TABLET | Refills: 3 | Status: SHIPPED | OUTPATIENT
Start: 2022-06-30 | End: 2023-06-15

## 2022-06-30 RX ORDER — LOSARTAN POTASSIUM 100 MG/1
100 TABLET ORAL DAILY
Qty: 90 TABLET | Refills: 3 | OUTPATIENT
Start: 2022-06-30 | End: 2022-06-30

## 2022-06-30 RX ORDER — PRAVASTATIN SODIUM 80 MG/1
80 TABLET ORAL DAILY
COMMUNITY
End: 2023-06-15 | Stop reason: SDUPTHER

## 2022-06-30 RX ORDER — FUROSEMIDE 40 MG/1
40 TABLET ORAL 2 TIMES DAILY
Qty: 180 TABLET | Refills: 3 | Status: SHIPPED | OUTPATIENT
Start: 2022-06-30 | End: 2023-06-15

## 2022-06-30 RX ORDER — BISOPROLOL FUMARATE 5 MG/1
5 TABLET, FILM COATED ORAL DAILY
Qty: 90 TABLET | Refills: 3 | Status: SHIPPED | OUTPATIENT
Start: 2022-06-30 | End: 2023-06-15

## 2022-06-30 RX ORDER — BISOPROLOL FUMARATE 5 MG/1
5 TABLET, FILM COATED ORAL DAILY
Qty: 90 TABLET | Refills: 3 | OUTPATIENT
Start: 2022-06-30 | End: 2022-06-30

## 2022-06-30 RX ORDER — AMLODIPINE BESYLATE 10 MG/1
10 TABLET ORAL DAILY
Qty: 90 TABLET | Refills: 3 | OUTPATIENT
Start: 2022-06-30 | End: 2022-06-30

## 2022-06-30 RX ORDER — FUROSEMIDE 40 MG/1
40 TABLET ORAL 2 TIMES DAILY
Qty: 60 TABLET | Refills: 11 | OUTPATIENT
Start: 2022-06-30 | End: 2022-06-30

## 2022-06-30 NOTE — PROGRESS NOTES
Subjective:   Patient ID:  Lucas Garrison is a 71 y.o. male who presents for evaluation of No chief complaint on file.      70-YEAR-OLD MALE WITH HISTORY OF HYPERTENSION, OBESITY, OBSTRUCTIVE SLEEP APNEA COMPLIANT WITH CPAP, HYPERLIPIDEMIA.  PATIENT IS A  [facility]  PATIENT WAS HAVING A ROUTINE COLONOSCOPY FOR SCREENING EARLIER TODAY.  HE WAS CAUGHT TO HAVE AFIB NOT AN RVR.  THE OS HE WAS REFERRED FOR EVALUATION.  HE DENIES ANY PALPITATIONS, SYNCOPE, PRESYNCOPE, DIZZINESS.  HE DOES REPORT ORTHOPNEA, DYSPNEA ON EXERTION AND LOWER EXTREMITY SWELLING.  HE ADMITS TO NO SALT RESTRICTION.  HE DENIES ANY CHEST PAIN, CLAUDICATION, BLEEDING, MELENA OR HEMATEMESIS,  HE STATES THAT HE HAD A NORMAL HEART CATHETERIZATION 10 YEARS AGO    5/10/2021   Feels better but still complains of SINGH , felt better after the mario/dccv  But lately singh again, with mild LE swelling, + mild orthopnea  ekg sinus bradycardia, non specific st t waves   Increase lasix, less salt     6/10/2021   Had abnormal stress test after last visit , cath was normal cors   Mildly elevated left sided pressure   Still feeling dyspnea   euvolemic on exam   No chest pain, in sinus rhythm   No other complaints except for insomnia   Cath site right radial and right antecubital no issues     1.3.2021  DOING WELL.  Denies chest pain.  Still reports dyspnea on exertion however better compared to in the past.  NYHA 1.   Still not completely adherent with low salt  Known to have mildly elevated LVEDP on his catheterization last year.  With normal coronaries.  No palpitations, no bleeding while on Eliquis.  Not on aspirin.  Blood pressure not at goal states he was rushing to the clinic today.  Will switch his Toprol to bisoprolol.  He states that [facility] and he had a normal PFT as per the patient himself however no documents  He is taking Lasix 40 mg twice a day.  Asking for  a new prescription before he runs  out of the medication      2022  Doing well , had normal cath a year ago  No CP   SINGH resolved after last med adjustments   Needs refill   SEE ROS     Past Medical History:   Diagnosis Date    Cancer     DDD (degenerative disc disease)     H/O prostate cancer     HLD (hyperlipidemia)     Hypertension     Lumbago     REGINA (obstructive sleep apnea)     Peripheral neuropathy     PTSD (post-traumatic stress disorder)        Past Surgical History:   Procedure Laterality Date    CATHETERIZATION OF BOTH LEFT AND RIGHT HEART N/A 2021    Procedure: CATHETERIZATION, HEART, BOTH LEFT AND RIGHT;  Surgeon: Baldemar Davalos MD;  Location: [facility] CATH LAB;  Service: Cardiology;  Laterality: N/A;    COLONOSCOPY N/A 2016    Procedure: COLONOSCOPY;  Surgeon: Zeus Guerrier MD;  Location: [facility] ENDO;  Service: Endoscopy;  Laterality: N/A;    COLONOSCOPY N/A 2021    Procedure: COLONOSCOPY;  Surgeon: Joselyn Castanon MD;  Location: [facility] ENDO;  Service: Endoscopy;  Laterality: N/A;    CORONARY ANGIOGRAPHY N/A 2021    Procedure: ANGIOGRAM, CORONARY ARTERY;  Surgeon: Baldemar Davalos MD;  Location: [facility] CATH LAB;  Service: Cardiology;  Laterality: N/A;    prostate radiation seeds      RIGHT HEART CATHETERIZATION Right 2021    Procedure: INSERTION, CATHETER, RIGHT HEART;  Surgeon: Baldemar Davalos MD;  Location: [facility] CATH LAB;  Service: Cardiology;  Laterality: Right;    TONSILLECTOMY      TRANSURETHRAL RESECTION OF PROSTATE         Social History     Tobacco Use    Smoking status: Former Smoker     Packs/day: 1.50     Years: 10.00     Pack years: 15.00     Quit date: 10/30/1982     Years since quittin.6   Substance Use Topics    Alcohol use: No    Drug use: No       Family History   Problem Relation Age of Onset    COPD Mother     Depression Brother     Colon cancer Neg Hx        Review of Systems   Constitutional: Negative for fever and malaise/fatigue.   HENT: Negative  for sore throat.    Eyes: Negative for blurred vision.   Cardiovascular: Negative for chest pain, claudication, cyanosis, dyspnea on exertion, irregular heartbeat, leg swelling, near-syncope, orthopnea, palpitations, paroxysmal nocturnal dyspnea and syncope.   Respiratory: Negative for cough, hemoptysis and shortness of breath.    Hematologic/Lymphatic: Negative for bleeding problem.   Skin: Negative for poor wound healing and rash.   Musculoskeletal: Negative for back pain and falls.   Gastrointestinal: Negative for abdominal pain.   Genitourinary: Negative for nocturia.   Neurological: Negative for dizziness, focal weakness, headaches, light-headedness and loss of balance.   Psychiatric/Behavioral: Negative for altered mental status and substance abuse.       Current Outpatient Medications on File Prior to Visit   Medication Sig    amitriptyline (ELAVIL) 50 MG tablet Take 50 mg by mouth every evening.    amlodipine (NORVASC) 10 MG tablet Take 10 mg by mouth once daily.    apixaban (ELIQUIS) 5 mg Tab Take 1 tablet (5 mg total) by mouth 2 (two) times daily.    bisoprolol (ZEBETA) 5 MG tablet Take 1 tablet (5 mg total) by mouth once daily.    citalopram (CELEXA) 40 MG tablet Take 40 mg by mouth once daily.    fish oil-omega-3 fatty acids 300-1,000 mg capsule Take 1 g by mouth once daily.    furosemide (LASIX) 40 MG tablet Take 1 tablet (40 mg total) by mouth 2 (two) times daily.    gabapentin (NEURONTIN) 600 MG tablet Take 600 mg by mouth.    losartan (COZAAR) 100 MG tablet Take 100 mg by mouth.    methocarbamol (ROBAXIN) 750 MG Tab Take 500 mg by mouth 3 (three) times daily.    mirtazapine (REMERON) 30 MG tablet Take 30 mg by mouth every evening.    multivitamin capsule Take 1 capsule by mouth once daily.    naproxen (NAPROSYN) 500 MG tablet Take 500 mg by mouth 2 (two) times daily with meals.    omeprazole (PRILOSEC) 20 MG capsule Take 20 mg by mouth once daily.    pravastatin (PRAVACHOL) 80 MG  tablet Take 80 mg by mouth once daily.    prazosin (MINIPRESS) 5 MG capsule Take 5 mg by mouth 2 (two) times daily.    amiodarone (PACERONE) 200 MG Tab Take 1 tablet (200 mg total) by mouth once daily.    aspirin (ECOTRIN) 81 MG EC tablet Take 81 mg by mouth once daily.    zolpidem (AMBIEN) 5 MG Tab Take 1 tablet (5 mg total) by mouth nightly as needed (INSOMNIA).     No current facility-administered medications on file prior to visit.       Objective:   Objective:  Wt Readings from Last 3 Encounters:   06/30/22 135 kg (297 lb 9.9 oz)   01/03/22 (!) 143.3 kg (315 lb 14.7 oz)   06/10/21 (!) 137.9 kg (304 lb 0.2 oz)     Temp Readings from Last 3 Encounters:   06/04/21 97.9 °F (36.6 °C)   04/06/21 98.1 °F (36.7 °C)   02/22/21 97.9 °F (36.6 °C) (Temporal)     BP Readings from Last 3 Encounters:   06/30/22 112/74   01/03/22 (!) 153/96   06/10/21 134/84     Pulse Readings from Last 3 Encounters:   06/30/22 (!) 56   01/03/22 79   06/10/21 75       Physical Exam  Vitals reviewed.   Constitutional:       Appearance: He is well-developed.   HENT:      Head: Normocephalic and atraumatic.   Eyes:      General: No scleral icterus.     Conjunctiva/sclera: Conjunctivae normal.   Cardiovascular:      Rate and Rhythm: Normal rate. Rhythm irregular.      Pulses: Intact distal pulses.      Heart sounds: Normal heart sounds. No murmur heard.  Pulmonary:      Effort: No respiratory distress.      Breath sounds: No wheezing or rales.   Chest:      Chest wall: No tenderness.   Abdominal:      General: Bowel sounds are normal. There is no distension.      Palpations: Abdomen is soft.      Tenderness: There is no guarding.   Musculoskeletal:         General: Normal range of motion.      Cervical back: Normal range of motion and neck supple.   Skin:     General: Skin is warm.   Neurological:      Mental Status: He is alert and oriented to person, place, and time.         No results found for: CHOL  No results found for: HDL  No results  found for: LDLCALC  No results found for: TRIG  No results found for: CHOLHDL    Chemistry        Component Value Date/Time     01/03/2022 1234    K 4.1 01/03/2022 1234     01/03/2022 1234    CO2 27 01/03/2022 1234    BUN 15 01/03/2022 1234    CREATININE 1.2 01/03/2022 1234    GLU 87 01/03/2022 1234        Component Value Date/Time    CALCIUM 9.4 01/03/2022 1234    ALKPHOS 52 (L) 04/06/2021 0729    AST 32 04/06/2021 0729    ALT 45 (H) 04/06/2021 0729    BILITOT 0.7 04/06/2021 0729    ESTGFRAFRICA >60.0 01/03/2022 1234    EGFRNONAA >60.0 01/03/2022 1234          No results found for: TSH  Lab Results   Component Value Date    INR 1.0 06/01/2021     Lab Results   Component Value Date    WBC 6.00 06/01/2021    HGB 15.2 06/01/2021    HCT 43.2 06/01/2021    MCV 96 06/01/2021     06/01/2021     BNP  @LABRCNTIP(BNP,BNPTRIAGEBLO)@  CrCl cannot be calculated (Patient's most recent lab result is older than the maximum 7 days allowed.).     Imaging:  ======  No results found for this or any previous visit.    No results found for this or any previous visit.    No results found for this or any previous visit.    No results found for this or any previous visit.    No results found for this or any previous visit.    No valid procedures specified.    Diagnostic Results:  ECG: Reviewed, nsr after dccv       States he had a normal heart cath in 2010 with Dr Preciado       2021   Left Main   The vessel is large.   Left Anterior Descending   First Diagonal Branch   The vessel is large.   Ramus Intermedius   The vessel is large.   Left Circumflex   The vessel is large.   First Obtuse Marginal Branch   The vessel is large.   Right Coronary Artery   The vessel is large.   Right Posterior Descending Artery   The vessel is large.   Right Posterior Atrioventricular Artery   The vessel is angiographically normal.   Intervention    No interventions have been documented.  Left Heart Findings    Left Ventricle    LVEDP  (Pre):15 mmHG   Aortic Valve    No gradient on pullback   Right Heart Findings    Right Heart Pressures    RA: 8 RV: 38/ 8 PA: 36/ 17/ 22 PWP: 15 .   Cardiac output was 8. Cardiac index is 3.22 L/min/m2.   O2 Sat: PA 75%.       The ASCVD Risk score (Brenda SOTO Jr., et al., 2013) failed to calculate for the following reasons:    Cannot find a previous HDL lab    Cannot find a previous total cholesterol lab    Assessment and Plan:   Hypertension, unspecified type    Atrial fibrillation, unspecified type  Comments:  CHADSVASC 2, UNDESTADNS DIFFERENCE BETWEEN DOAC AND ASA VS NO AC    SINGH (dyspnea on exertion)      SINGH (dyspnea on exertion)    Atrial fibrillation, unspecified type    Morbid obesity    Hypertension, unspecified type    REGINA (obstructive sleep apnea)    Long term current use of amiodarone    Ex-smoker    ON PRAVASTATIN 80 MG   cw lasix bid   Weight loss and exercise  Low salt diet   No cad   lvedp 15 mmhg   CW NOAC  cw bisoprolol for better blood pressure control.  Reviewed all tests and above medical conditions with patient in detail and formulated treatment plan.  Risk factor modification discussed.   Cardiac low salt diet discussed.  Maintaining healthy weight and weight loss goals were discussed in clinic.  [facility]    Follow up in 6 months

## 2022-12-13 ENCOUNTER — OFFICE VISIT (OUTPATIENT)
Dept: CARDIOLOGY | Facility: CLINIC | Age: 72
End: 2022-12-13
Payer: OTHER GOVERNMENT

## 2022-12-13 VITALS
HEIGHT: 71 IN | OXYGEN SATURATION: 97 % | WEIGHT: 315 LBS | BODY MASS INDEX: 44.1 KG/M2 | HEART RATE: 80 BPM | DIASTOLIC BLOOD PRESSURE: 78 MMHG | SYSTOLIC BLOOD PRESSURE: 118 MMHG

## 2022-12-13 DIAGNOSIS — G47.33 OSA (OBSTRUCTIVE SLEEP APNEA): ICD-10-CM

## 2022-12-13 DIAGNOSIS — R06.09 DOE (DYSPNEA ON EXERTION): Primary | ICD-10-CM

## 2022-12-13 DIAGNOSIS — Z77.098 H/O AGENT ORANGE EXPOSURE: ICD-10-CM

## 2022-12-13 DIAGNOSIS — I48.0 PAROXYSMAL ATRIAL FIBRILLATION: ICD-10-CM

## 2022-12-13 DIAGNOSIS — M79.89 LOCALIZED SWELLING OF BOTH LOWER EXTREMITIES: ICD-10-CM

## 2022-12-13 PROCEDURE — 99215 OFFICE O/P EST HI 40 MIN: CPT | Mod: PBBFAC | Performed by: INTERNAL MEDICINE

## 2022-12-13 PROCEDURE — 99999 PR PBB SHADOW E&M-EST. PATIENT-LVL V: CPT | Mod: PBBFAC,,, | Performed by: INTERNAL MEDICINE

## 2022-12-13 PROCEDURE — 99999 PR PBB SHADOW E&M-EST. PATIENT-LVL V: ICD-10-PCS | Mod: PBBFAC,,, | Performed by: INTERNAL MEDICINE

## 2022-12-13 PROCEDURE — 99214 PR OFFICE/OUTPT VISIT, EST, LEVL IV, 30-39 MIN: ICD-10-PCS | Mod: S$PBB,,, | Performed by: INTERNAL MEDICINE

## 2022-12-13 PROCEDURE — 99214 OFFICE O/P EST MOD 30 MIN: CPT | Mod: S$PBB,,, | Performed by: INTERNAL MEDICINE

## 2022-12-13 RX ORDER — ESCITALOPRAM OXALATE 20 MG/1
20 TABLET ORAL DAILY
COMMUNITY

## 2022-12-13 RX ORDER — LORATADINE 10 MG/1
10 TABLET ORAL DAILY
COMMUNITY

## 2022-12-13 RX ORDER — PRAZOSIN HYDROCHLORIDE 2 MG/1
2 CAPSULE ORAL DAILY
COMMUNITY
End: 2024-01-31

## 2022-12-13 RX ORDER — TRAZODONE HYDROCHLORIDE 50 MG/1
50 TABLET ORAL NIGHTLY
COMMUNITY

## 2022-12-13 RX ORDER — QUETIAPINE FUMARATE 50 MG/1
50 TABLET, FILM COATED ORAL NIGHTLY
COMMUNITY

## 2022-12-13 NOTE — PROGRESS NOTES
Subjective:   Patient ID:  Lucas Garrison is a 72 y.o. male who presents for evaluation of No chief complaint on file.      70-YEAR-OLD MALE WITH HISTORY OF HYPERTENSION, OBESITY, OBSTRUCTIVE SLEEP APNEA COMPLIANT WITH CPAP, HYPERLIPIDEMIA.  PATIENT IS A  [facility]  PATIENT WAS HAVING A ROUTINE COLONOSCOPY FOR SCREENING EARLIER TODAY.  HE WAS CAUGHT TO HAVE AFIB NOT AN RVR.  THE OS HE WAS REFERRED FOR EVALUATION.  HE DENIES ANY PALPITATIONS, SYNCOPE, PRESYNCOPE, DIZZINESS.  HE DOES REPORT ORTHOPNEA, DYSPNEA ON EXERTION AND LOWER EXTREMITY SWELLING.  HE ADMITS TO NO SALT RESTRICTION.  HE DENIES ANY CHEST PAIN, CLAUDICATION, BLEEDING, MELENA OR HEMATEMESIS,  HE STATES THAT HE HAD A NORMAL HEART CATHETERIZATION 10 YEARS AGO    5/10/2021   Feels better but still complains of SINGH , felt better after the mario/dccv  But lately singh again, with mild LE swelling, + mild orthopnea  ekg sinus bradycardia, non specific st t waves   Increase lasix, less salt     6/10/2021   Had abnormal stress test after last visit , cath was normal cors   Mildly elevated left sided pressure   Still feeling dyspnea   euvolemic on exam   No chest pain, in sinus rhythm   No other complaints except for insomnia   Cath site right radial and right antecubital no issues     1.3.2021  DOING WELL.  Denies chest pain.  Still reports dyspnea on exertion however better compared to in the past.  NYHA 1.   Still not completely adherent with low salt  Known to have mildly elevated LVEDP on his catheterization last year.  With normal coronaries.  No palpitations, no bleeding while on Eliquis.  Not on aspirin.  Blood pressure not at goal states he was rushing to the clinic today.  Will switch his Toprol to bisoprolol.  He states that [facility] and he had [facility] as per the patient himself however no documents  He is taking Lasix 40 mg twice a day.  Asking for  a new prescription before he runs  out of the medication      6.30.2022  Doing well , had normal cath a year ago  No CP   TRONCOSO resolved after last med adjustments   Needs refill   SEE ROS     12.13.2022  Comes in for a 6 months follow up   Still has his chronic TRONCOSO, still [facility]  He has dependent lower ext edema , but also has orthopnea , troncoso and lvedp 15 mmhg on cath   Uses his lasix twice a day , states good diuresis   No chest pain   No palpitations  He states [facility] and not [facility] his meds I reviewed the meds   No bleeding   Uses his CPAP   Normal cath 2021    Past Medical History:   Diagnosis Date    Cancer     DDD (degenerative disc disease)     H/O prostate cancer     HLD (hyperlipidemia)     Hypertension     Lumbago     REGINA (obstructive sleep apnea)     Peripheral neuropathy     PTSD (post-traumatic stress disorder)        Past Surgical History:   Procedure Laterality Date    CATHETERIZATION OF BOTH LEFT AND RIGHT HEART N/A 6/4/2021    Procedure: CATHETERIZATION, HEART, BOTH LEFT AND RIGHT;  Surgeon: Baldemar Davalos MD;  Location: [facility] CATH LAB;  Service: Cardiology;  Laterality: N/A;    COLONOSCOPY N/A 12/7/2016    Procedure: COLONOSCOPY;  Surgeon: Zeus Guerrier MD;  Location: [facility] ENDO;  Service: Endoscopy;  Laterality: N/A;    COLONOSCOPY N/A 2/22/2021    Procedure: COLONOSCOPY;  Surgeon: Joselyn Castanon MD;  Location: [facility] ENDO;  Service: Endoscopy;  Laterality: N/A;    CORONARY ANGIOGRAPHY N/A 6/4/2021    Procedure: ANGIOGRAM, CORONARY ARTERY;  Surgeon: Baldemar Davalos MD;  Location: [facility] CATH LAB;  Service: Cardiology;  Laterality: N/A;    prostate radiation seeds  2010    RIGHT HEART CATHETERIZATION Right 6/4/2021    Procedure: INSERTION, CATHETER, RIGHT HEART;  Surgeon: Baldemar Davalos MD;  Location: [facility] CATH LAB;  Service: Cardiology;  Laterality: Right;    TONSILLECTOMY      TRANSURETHRAL RESECTION OF PROSTATE         Social History     Tobacco Use    Smoking  status: Former     Packs/day: 1.50     Years: 10.00     Pack years: 15.00     Types: Cigarettes     Quit date: 10/30/1982     Years since quittin.1   Substance Use Topics    Alcohol use: No    Drug use: No       Family History   Problem Relation Age of Onset    COPD Mother     Depression Brother     Colon cancer Neg Hx        Review of Systems   Constitutional: Negative for fever and malaise/fatigue.   HENT:  Negative for sore throat.    Eyes:  Negative for blurred vision.   Cardiovascular:  Negative for chest pain, claudication, cyanosis, dyspnea on exertion, irregular heartbeat, leg swelling, near-syncope, orthopnea, palpitations, paroxysmal nocturnal dyspnea and syncope.   Respiratory:  Negative for cough, hemoptysis and shortness of breath.    Hematologic/Lymphatic: Negative for bleeding problem.   Skin:  Negative for poor wound healing and rash.   Musculoskeletal:  Negative for back pain and falls.   Gastrointestinal:  Negative for abdominal pain.   Genitourinary:  Negative for nocturia.   Neurological:  Negative for dizziness, focal weakness, headaches, light-headedness and loss of balance.   Psychiatric/Behavioral:  Negative for altered mental status and substance abuse.      Current Outpatient Medications on File Prior to Visit   Medication Sig    amLODIPine (NORVASC) 10 MG tablet Take 1 tablet (10 mg total) by mouth once daily.    apixaban (ELIQUIS) 5 mg Tab Take 1 tablet (5 mg total) by mouth 2 (two) times daily.    aspirin (ECOTRIN) 81 MG EC tablet Take 81 mg by mouth once daily.    bisoprolol (ZEBETA) 5 MG tablet Take 1 tablet (5 mg total) by mouth once daily.    EScitalopram oxalate (LEXAPRO) 20 MG tablet Take 20 mg by mouth once daily.    fish oil-omega-3 fatty acids 300-1,000 mg capsule Take 1 g by mouth once daily.    furosemide (LASIX) 40 MG tablet Take 1 tablet (40 mg total) by mouth 2 (two) times daily.    gabapentin (NEURONTIN) 600 MG tablet Take 600 mg by mouth.    loratadine (CLARITIN) 10  mg tablet Take 10 mg by mouth once daily.    losartan (COZAAR) 100 MG tablet Take 1 tablet (100 mg total) by mouth once daily.    mirtazapine (REMERON) 30 MG tablet Take 30 mg by mouth every evening.    multivitamin capsule Take 1 capsule by mouth once daily.    omeprazole (PRILOSEC) 20 MG capsule Take 20 mg by mouth once daily.    pravastatin (PRAVACHOL) 80 MG tablet Take 80 mg by mouth once daily.    prazosin (MINIPRESS) 2 MG Cap Take 2 mg by mouth once daily.    prazosin (MINIPRESS) 5 MG capsule Take 5 mg by mouth 2 (two) times daily.    QUEtiapine (SEROQUEL) 50 MG tablet Take 50 mg by mouth nightly. One-half tablet    traZODone (DESYREL) 50 MG tablet Take 50 mg by mouth every evening.    zolpidem (AMBIEN) 5 MG Tab Take 1 tablet (5 mg total) by mouth nightly as needed (INSOMNIA). (Patient taking differently: Take 10 mg by mouth nightly as needed (INSOMNIA).)    amiodarone (PACERONE) 200 MG Tab Take 1 tablet (200 mg total) by mouth once daily.    amitriptyline (ELAVIL) 50 MG tablet Take 50 mg by mouth every evening.    citalopram (CELEXA) 40 MG tablet Take 40 mg by mouth once daily.    methocarbamol (ROBAXIN) 750 MG Tab Take 500 mg by mouth 3 (three) times daily.    naproxen (NAPROSYN) 500 MG tablet Take 500 mg by mouth 2 (two) times daily with meals.     No current facility-administered medications on file prior to visit.       Objective:   Objective:  Wt Readings from Last 3 Encounters:   12/13/22 (!) 142.9 kg (315 lb 0.6 oz)   06/30/22 135 kg (297 lb 9.9 oz)   01/03/22 (!) 143.3 kg (315 lb 14.7 oz)     Temp Readings from Last 3 Encounters:   06/04/21 97.9 °F (36.6 °C)   04/06/21 98.1 °F (36.7 °C)   02/22/21 97.9 °F (36.6 °C) (Temporal)     BP Readings from Last 3 Encounters:   12/13/22 118/78   06/30/22 112/74   01/03/22 (!) 153/96     Pulse Readings from Last 3 Encounters:   12/13/22 80   06/30/22 (!) 56   01/03/22 79       Physical Exam  Vitals reviewed.   Constitutional:       Appearance: He is  well-developed.   HENT:      Head: Normocephalic and atraumatic.   Eyes:      General: No scleral icterus.     Conjunctiva/sclera: Conjunctivae normal.   Cardiovascular:      Rate and Rhythm: Normal rate. Rhythm irregular.      Pulses: Intact distal pulses.      Heart sounds: Normal heart sounds. No murmur heard.  Pulmonary:      Effort: No respiratory distress.      Breath sounds: No wheezing or rales.   Chest:      Chest wall: No tenderness.   Abdominal:      General: Bowel sounds are normal. There is no distension.      Palpations: Abdomen is soft.      Tenderness: There is no guarding.   Musculoskeletal:         General: Normal range of motion.      Cervical back: Normal range of motion and neck supple.   Skin:     General: Skin is warm.   Neurological:      Mental Status: He is alert and oriented to person, place, and time.       No results found for: CHOL  No results found for: HDL  No results found for: LDLCALC  No results found for: TRIG  No results found for: CHOLHDL    Chemistry        Component Value Date/Time     01/03/2022 1234    K 4.1 01/03/2022 1234     01/03/2022 1234    CO2 27 01/03/2022 1234    BUN 15 01/03/2022 1234    CREATININE 1.2 01/03/2022 1234    GLU 87 01/03/2022 1234        Component Value Date/Time    CALCIUM 9.4 01/03/2022 1234    ALKPHOS 52 (L) 04/06/2021 0729    AST 32 04/06/2021 0729    ALT 45 (H) 04/06/2021 0729    BILITOT 0.7 04/06/2021 0729    ESTGFRAFRICA >60.0 01/03/2022 1234    EGFRNONAA >60.0 01/03/2022 1234          No results found for: TSH  Lab Results   Component Value Date    INR 1.0 06/01/2021     Lab Results   Component Value Date    WBC 6.00 06/01/2021    HGB 15.2 06/01/2021    HCT 43.2 06/01/2021    MCV 96 06/01/2021     06/01/2021     BNP  @LABRCNTIP(BNP,BNPTRIAGEBLO)@  CrCl cannot be calculated (Patient's most recent lab result is older than the maximum 7 days allowed.).     Imaging:  ======  No results found for this or any previous visit.    No  results found for this or any previous visit.    No results found for this or any previous visit.    No results found for this or any previous visit.    No results found for this or any previous visit.    No valid procedures specified.    Diagnostic Results:  ECG: Reviewed, nsr after [facility]       States he had a normal heart cath in 2010 with Dr Preciado       2021   Left Main   The vessel is large.   Left Anterior Descending   First Diagonal Branch   The vessel is large.   Ramus Intermedius   The vessel is large.   Left Circumflex   The vessel is large.   First Obtuse Marginal Branch   The vessel is large.   Right Coronary Artery   The vessel is large.   Right Posterior Descending Artery   The vessel is large.   Right Posterior Atrioventricular Artery   The vessel is angiographically normal.   Intervention    No interventions have been documented.  Left Heart Findings    Left Ventricle    LVEDP (Pre):15 mmHG   Aortic Valve    No gradient on pullback   Right Heart Findings    Right Heart Pressures    RA: 8 RV: 38/ 8 PA: 36/ 17/ 22 PWP: 15 .   Cardiac output was 8. Cardiac index is 3.22 L/min/m2.   O2 Sat: PA 75%.       The ASCVD Risk score (April DK, et al., 2019) failed to calculate for the following reasons:    Cannot find a previous HDL lab    Cannot find a previous total cholesterol lab    Assessment and Plan:   SINGH (dyspnea on exertion)  -     Six Minute Walk Test to qualify for Home Oxygen; Future    Paroxysmal atrial fibrillation    REGINA (obstructive sleep apnea)    Localized swelling of both lower extremities    H/O agent Orange exposure      Morbid obesity    Hypertension, unspecified type    Ex-smoker    ON PRAVASTATIN 80 MG   cw lasix bid   Weight loss and exercise  Low salt diet   No cad   lvedp 15 mmhg in 2021   CW NOAC  cw bisoprolol   Reviewed all tests and above medical conditions with patient in detail and formulated treatment plan.  Risk factor modification discussed.   Cardiac low salt diet  discussed.  Maintaining healthy weight and weight loss goals were discussed in clinic.      Follow up in 6 months

## 2023-05-22 ENCOUNTER — PATIENT MESSAGE (OUTPATIENT)
Dept: RESEARCH | Facility: HOSPITAL | Age: 73
End: 2023-05-22
Payer: MEDICARE

## 2023-06-15 ENCOUNTER — OFFICE VISIT (OUTPATIENT)
Dept: CARDIOLOGY | Facility: CLINIC | Age: 73
End: 2023-06-15
Payer: MEDICARE

## 2023-06-15 ENCOUNTER — LAB VISIT (OUTPATIENT)
Dept: LAB | Facility: HOSPITAL | Age: 73
End: 2023-06-15
Attending: INTERNAL MEDICINE
Payer: OTHER GOVERNMENT

## 2023-06-15 VITALS
HEART RATE: 77 BPM | WEIGHT: 315 LBS | HEIGHT: 71 IN | OXYGEN SATURATION: 97 % | SYSTOLIC BLOOD PRESSURE: 118 MMHG | BODY MASS INDEX: 44.1 KG/M2 | DIASTOLIC BLOOD PRESSURE: 82 MMHG

## 2023-06-15 DIAGNOSIS — I48.91 ATRIAL FIBRILLATION, UNSPECIFIED TYPE: ICD-10-CM

## 2023-06-15 DIAGNOSIS — R06.09 DOE (DYSPNEA ON EXERTION): ICD-10-CM

## 2023-06-15 DIAGNOSIS — R06.01 ORTHOPNEA: ICD-10-CM

## 2023-06-15 DIAGNOSIS — E66.01 MORBID OBESITY: ICD-10-CM

## 2023-06-15 DIAGNOSIS — G47.33 OSA (OBSTRUCTIVE SLEEP APNEA): ICD-10-CM

## 2023-06-15 DIAGNOSIS — I50.33 ACUTE ON CHRONIC DIASTOLIC HEART FAILURE: ICD-10-CM

## 2023-06-15 DIAGNOSIS — I10 HYPERTENSION, UNSPECIFIED TYPE: ICD-10-CM

## 2023-06-15 DIAGNOSIS — I48.0 PAROXYSMAL ATRIAL FIBRILLATION: ICD-10-CM

## 2023-06-15 DIAGNOSIS — Z77.098 H/O AGENT ORANGE EXPOSURE: ICD-10-CM

## 2023-06-15 DIAGNOSIS — E78.5 HYPERLIPIDEMIA, UNSPECIFIED HYPERLIPIDEMIA TYPE: Primary | ICD-10-CM

## 2023-06-15 LAB
ANION GAP SERPL CALC-SCNC: 8 MMOL/L (ref 8–16)
BASOPHILS # BLD AUTO: 0.07 K/UL (ref 0–0.2)
BASOPHILS NFR BLD: 1 % (ref 0–1.9)
BNP SERPL-MCNC: 149 PG/ML (ref 0–99)
BUN SERPL-MCNC: 12 MG/DL (ref 8–23)
CALCIUM SERPL-MCNC: 9.1 MG/DL (ref 8.7–10.5)
CHLORIDE SERPL-SCNC: 109 MMOL/L (ref 95–110)
CO2 SERPL-SCNC: 26 MMOL/L (ref 23–29)
CREAT SERPL-MCNC: 1.3 MG/DL (ref 0.5–1.4)
DIFFERENTIAL METHOD: ABNORMAL
EOSINOPHIL # BLD AUTO: 0.2 K/UL (ref 0–0.5)
EOSINOPHIL NFR BLD: 3 % (ref 0–8)
ERYTHROCYTE [DISTWIDTH] IN BLOOD BY AUTOMATED COUNT: 12.6 % (ref 11.5–14.5)
EST. GFR  (NO RACE VARIABLE): 58 ML/MIN/1.73 M^2
GLUCOSE SERPL-MCNC: 100 MG/DL (ref 70–110)
HCT VFR BLD AUTO: 42.5 % (ref 40–54)
HGB BLD-MCNC: 14.9 G/DL (ref 14–18)
IMM GRANULOCYTES # BLD AUTO: 0.01 K/UL (ref 0–0.04)
IMM GRANULOCYTES NFR BLD AUTO: 0.1 % (ref 0–0.5)
INR PPP: 1 (ref 0.8–1.2)
LYMPHOCYTES # BLD AUTO: 2.1 K/UL (ref 1–4.8)
LYMPHOCYTES NFR BLD: 29.7 % (ref 18–48)
MCH RBC QN AUTO: 34.4 PG (ref 27–31)
MCHC RBC AUTO-ENTMCNC: 35.1 G/DL (ref 32–36)
MCV RBC AUTO: 98 FL (ref 82–98)
MONOCYTES # BLD AUTO: 0.7 K/UL (ref 0.3–1)
MONOCYTES NFR BLD: 10.2 % (ref 4–15)
NEUTROPHILS # BLD AUTO: 3.9 K/UL (ref 1.8–7.7)
NEUTROPHILS NFR BLD: 56 % (ref 38–73)
NRBC BLD-RTO: 0 /100 WBC
PLATELET # BLD AUTO: 199 K/UL (ref 150–450)
PMV BLD AUTO: 11 FL (ref 9.2–12.9)
POTASSIUM SERPL-SCNC: 4 MMOL/L (ref 3.5–5.1)
PROTHROMBIN TIME: 11 SEC (ref 9–12.5)
RBC # BLD AUTO: 4.33 M/UL (ref 4.6–6.2)
SODIUM SERPL-SCNC: 143 MMOL/L (ref 136–145)
WBC # BLD AUTO: 6.94 K/UL (ref 3.9–12.7)

## 2023-06-15 PROCEDURE — 99214 OFFICE O/P EST MOD 30 MIN: CPT | Mod: PBBFAC | Performed by: INTERNAL MEDICINE

## 2023-06-15 PROCEDURE — 99214 PR OFFICE/OUTPT VISIT, EST, LEVL IV, 30-39 MIN: ICD-10-PCS | Mod: S$PBB,,, | Performed by: INTERNAL MEDICINE

## 2023-06-15 PROCEDURE — 85025 COMPLETE CBC W/AUTO DIFF WBC: CPT | Performed by: INTERNAL MEDICINE

## 2023-06-15 PROCEDURE — 85610 PROTHROMBIN TIME: CPT | Performed by: INTERNAL MEDICINE

## 2023-06-15 PROCEDURE — 83880 ASSAY OF NATRIURETIC PEPTIDE: CPT | Performed by: INTERNAL MEDICINE

## 2023-06-15 PROCEDURE — 93010 EKG 12-LEAD: ICD-10-PCS | Mod: ,,, | Performed by: INTERNAL MEDICINE

## 2023-06-15 PROCEDURE — 99999 PR PBB SHADOW E&M-EST. PATIENT-LVL IV: ICD-10-PCS | Mod: PBBFAC,,, | Performed by: INTERNAL MEDICINE

## 2023-06-15 PROCEDURE — 80048 BASIC METABOLIC PNL TOTAL CA: CPT | Performed by: INTERNAL MEDICINE

## 2023-06-15 PROCEDURE — 99214 OFFICE O/P EST MOD 30 MIN: CPT | Mod: S$PBB,,, | Performed by: INTERNAL MEDICINE

## 2023-06-15 PROCEDURE — 99999 PR PBB SHADOW E&M-EST. PATIENT-LVL IV: CPT | Mod: PBBFAC,,, | Performed by: INTERNAL MEDICINE

## 2023-06-15 PROCEDURE — 93010 ELECTROCARDIOGRAM REPORT: CPT | Mod: ,,, | Performed by: INTERNAL MEDICINE

## 2023-06-15 PROCEDURE — 93005 ELECTROCARDIOGRAM TRACING: CPT

## 2023-06-15 PROCEDURE — 36415 COLL VENOUS BLD VENIPUNCTURE: CPT | Performed by: INTERNAL MEDICINE

## 2023-06-15 RX ORDER — PRAVASTATIN SODIUM 80 MG/1
80 TABLET ORAL DAILY
Qty: 90 TABLET | Refills: 3 | Status: SHIPPED | OUTPATIENT
Start: 2023-06-15

## 2023-06-15 RX ORDER — METOLAZONE 5 MG/1
5 TABLET ORAL
Qty: 10 TABLET | Refills: 11 | Status: ON HOLD | OUTPATIENT
Start: 2023-06-15 | End: 2023-06-20 | Stop reason: HOSPADM

## 2023-06-15 RX ORDER — BISOPROLOL FUMARATE 5 MG/1
5 TABLET, FILM COATED ORAL DAILY
Qty: 90 TABLET | Refills: 3 | Status: ON HOLD | OUTPATIENT
Start: 2023-06-15 | End: 2023-06-20 | Stop reason: HOSPADM

## 2023-06-15 RX ORDER — FUROSEMIDE 40 MG/1
40 TABLET ORAL 2 TIMES DAILY
Qty: 180 TABLET | Refills: 3 | OUTPATIENT
Start: 2023-06-15 | End: 2023-07-06

## 2023-06-15 RX ORDER — AMLODIPINE BESYLATE 10 MG/1
10 TABLET ORAL DAILY
Qty: 90 TABLET | Refills: 3 | OUTPATIENT
Start: 2023-06-15

## 2023-06-15 NOTE — H&P (VIEW-ONLY)
Subjective:   Patient ID:  Lucas Garrison is a 72 y.o. male who presents for evaluation of Shortness of Breath      70-YEAR-OLD MALE WITH HISTORY OF HYPERTENSION, OBESITY, OBSTRUCTIVE SLEEP APNEA COMPLIANT WITH CPAP, HYPERLIPIDEMIA.  PATIENT IS A  [facility]  PATIENT WAS HAVING A ROUTINE COLONOSCOPY FOR SCREENING EARLIER TODAY.  HE WAS CAUGHT TO HAVE AFIB NOT AN RVR.  THE OS HE WAS REFERRED FOR EVALUATION.  HE DENIES ANY PALPITATIONS, SYNCOPE, PRESYNCOPE, DIZZINESS.  HE DOES REPORT ORTHOPNEA, DYSPNEA ON EXERTION AND LOWER EXTREMITY SWELLING.  HE ADMITS TO NO SALT RESTRICTION.  HE DENIES ANY CHEST PAIN, CLAUDICATION, BLEEDING, MELENA OR HEMATEMESIS,  HE STATES THAT HE HAD A NORMAL HEART CATHETERIZATION 10 YEARS AGO    5/10/2021   Feels better but still complains of SINGH , felt better after the mario/dccv  But lately singh again, with mild LE swelling, + mild orthopnea  ekg sinus bradycardia, non specific st t waves   Increase lasix, less salt     6/10/2021   Had abnormal stress test after last visit , cath was normal cors   Mildly elevated left sided pressure   Still feeling dyspnea   euvolemic on exam   No chest pain, in sinus rhythm   No other complaints except for insomnia   Cath site right radial and right antecubital no issues     Shortness of Breath  Associated symptoms include leg swelling. Pertinent negatives include no chest pain or syncope.   1.3.2021  DOING WELL.  Denies chest pain.  Still reports dyspnea on exertion however better compared to in the past.  NYHA 1.   Still not completely adherent with low salt  Known to have mildly elevated LVEDP on his catheterization last year.  With normal coronaries.  No palpitations, no bleeding while on Eliquis.  Not on aspirin.  Blood pressure not at goal states he was rushing to the clinic today.  Will switch his Toprol to bisoprolol.  He states that [facility] and he had [facility] normal P[facility] as per the patient  himself however no documents  He is taking Lasix 40 mg twice a day.  Asking for  a new prescription before he runs out of the medication      6.30.2022  Doing well , had normal cath a year ago  No CP   TRONCOSO resolved after last med adjustments   Needs refill   SEE ROS     12.13.2022  Comes in for a 6 months follow up   Still has his chronic TRONCOSO, still [facility]  He has dependent lower ext edema , but also has orthopnea , troncoso and lvedp 15 mmhg on cath   Uses his lasix twice a day , states good diuresis   No chest pain   No palpitations  He states [facility] and not [facility] meds I reviewed the meds   No bleeding   Uses his CPAP   Normal cath 2021    6.15.2023  In for six-month follow-up.    Having dyspnea on exertion NYHA 2.  Orthopnea PND.  Worsened in the last 2-3 weeks.  On EKG his AFib rate control.    He states he is taking the Lasix only once a day  States he avoiding any salt.    Had normal catheterization 2021.    His last NATIVIDAD cardioversion 2021      Past Medical History:   Diagnosis Date    Cancer     DDD (degenerative disc disease)     H/O prostate cancer     HLD (hyperlipidemia)     Hypertension     Lumbago     REGINA (obstructive sleep apnea)     Peripheral neuropathy     PTSD (post-traumatic stress disorder)        Past Surgical History:   Procedure Laterality Date    CATHETERIZATION OF BOTH LEFT AND RIGHT HEART N/A 6/4/2021    Procedure: CATHETERIZATION, HEART, BOTH LEFT AND RIGHT;  Surgeon: Baldemar Davalos MD;  Location: [facility] CATH LAB;  Service: Cardiology;  Laterality: N/A;    COLONOSCOPY N/A 12/7/2016    Procedure: COLONOSCOPY;  Surgeon: Zeus Guerrier MD;  Location: [facility] ENDO;  Service: Endoscopy;  Laterality: N/A;    COLONOSCOPY N/A 2/22/2021    Procedure: COLONOSCOPY;  Surgeon: Joselyn Castanon MD;  Location: [facility] ENDO;  Service: Endoscopy;  Laterality: N/A;    CORONARY ANGIOGRAPHY N/A 6/4/2021    Procedure: ANGIOGRAM, CORONARY ARTERY;   Surgeon: Baldemar Davalos MD;  Location: [facility] CATH LAB;  Service: Cardiology;  Laterality: N/A;    prostate radiation seeds      RIGHT HEART CATHETERIZATION Right 2021    Procedure: INSERTION, CATHETER, RIGHT HEART;  Surgeon: Baldemar Davalos MD;  Location: [facility] CATH LAB;  Service: Cardiology;  Laterality: Right;    TONSILLECTOMY      TRANSURETHRAL RESECTION OF PROSTATE         Social History     Tobacco Use    Smoking status: Former     Packs/day: 1.50     Years: 10.00     Pack years: 15.00     Types: Cigarettes     Quit date: 10/30/1982     Years since quittin.6   Substance Use Topics    Alcohol use: No    Drug use: No       Family History   Problem Relation Age of Onset    COPD Mother     Depression Brother     Colon cancer Neg Hx        Review of Systems   Cardiovascular:  Positive for dyspnea on exertion and leg swelling. Negative for chest pain, palpitations and syncope.   Respiratory:  Positive for shortness of breath.    Genitourinary: Negative.    Neurological: Negative.      Current Outpatient Medications on File Prior to Visit   Medication Sig    amitriptyline (ELAVIL) 50 MG tablet Take 50 mg by mouth every evening.    aspirin (ECOTRIN) 81 MG EC tablet Take 81 mg by mouth once daily.    citalopram (CELEXA) 40 MG tablet Take 40 mg by mouth once daily.    EScitalopram oxalate (LEXAPRO) 20 MG tablet Take 20 mg by mouth once daily.    fish oil-omega-3 fatty acids 300-1,000 mg capsule Take 1 g by mouth once daily.    gabapentin (NEURONTIN) 600 MG tablet Take 600 mg by mouth.    loratadine (CLARITIN) 10 mg tablet Take 10 mg by mouth once daily.    losartan (COZAAR) 100 MG tablet Take 1 tablet (100 mg total) by mouth once daily.    methocarbamol (ROBAXIN) 750 MG Tab Take 500 mg by mouth 3 (three) times daily.    mirtazapine (REMERON) 30 MG tablet Take 30 mg by mouth every evening.    multivitamin capsule Take 1 capsule by mouth once daily.    naproxen (NAPROSYN) 500 MG tablet Take 500 mg by mouth  2 (two) times daily with meals.    omeprazole (PRILOSEC) 20 MG capsule Take 20 mg by mouth once daily.    prazosin (MINIPRESS) 2 MG Cap Take 2 mg by mouth once daily.    prazosin (MINIPRESS) 5 MG capsule Take 5 mg by mouth 2 (two) times daily.    QUEtiapine (SEROQUEL) 50 MG tablet Take 50 mg by mouth nightly. One-half tablet    traZODone (DESYREL) 50 MG tablet Take 50 mg by mouth every evening.    zolpidem (AMBIEN) 5 MG Tab Take 1 tablet (5 mg total) by mouth nightly as needed (INSOMNIA). (Patient taking differently: Take 10 mg by mouth nightly as needed (INSOMNIA).)    [DISCONTINUED] amLODIPine (NORVASC) 10 MG tablet Take 1 tablet (10 mg total) by mouth once daily.    [DISCONTINUED] apixaban (ELIQUIS) 5 mg Tab Take 1 tablet (5 mg total) by mouth 2 (two) times daily.    [DISCONTINUED] bisoprolol (ZEBETA) 5 MG tablet Take 1 tablet (5 mg total) by mouth once daily.    [DISCONTINUED] furosemide (LASIX) 40 MG tablet Take 1 tablet (40 mg total) by mouth 2 (two) times daily.    [DISCONTINUED] pravastatin (PRAVACHOL) 80 MG tablet Take 80 mg by mouth once daily.     No current facility-administered medications on file prior to visit.       Objective:   Objective:  Wt Readings from Last 3 Encounters:   06/15/23 (!) 149.9 kg (330 lb 7.5 oz)   12/13/22 (!) 142.9 kg (315 lb 0.6 oz)   06/30/22 135 kg (297 lb 9.9 oz)     Temp Readings from Last 3 Encounters:   06/04/21 97.9 °F (36.6 °C)   04/06/21 98.1 °F (36.7 °C)   02/22/21 97.9 °F (36.6 °C) (Temporal)     BP Readings from Last 3 Encounters:   06/15/23 118/82   12/13/22 118/78   06/30/22 112/74     Pulse Readings from Last 3 Encounters:   06/15/23 77   12/13/22 80   06/30/22 (!) 56       Physical Exam  Vitals reviewed.   Constitutional:       Appearance: He is well-developed.   Neck:      Vascular: No carotid bruit.   Cardiovascular:      Rate and Rhythm: Normal rate and regular rhythm.      Pulses: Intact distal pulses.      Heart sounds: Normal heart sounds. No murmur  heard.  Pulmonary:      Breath sounds: Normal breath sounds.   Musculoskeletal:         General: Swelling present.   Neurological:      Mental Status: He is oriented to person, place, and time.       No results found for: CHOL  No results found for: HDL  No results found for: LDLCALC  No results found for: TRIG  No results found for: CHOLHDL    Chemistry        Component Value Date/Time     01/03/2022 1234    K 4.1 01/03/2022 1234     01/03/2022 1234    CO2 27 01/03/2022 1234    BUN 15 01/03/2022 1234    CREATININE 1.2 01/03/2022 1234    GLU 87 01/03/2022 1234        Component Value Date/Time    CALCIUM 9.4 01/03/2022 1234    ALKPHOS 52 (L) 04/06/2021 0729    AST 32 04/06/2021 0729    ALT 45 (H) 04/06/2021 0729    BILITOT 0.7 04/06/2021 0729    ESTGFRAFRICA >60.0 01/03/2022 1234    EGFRNONAA >60.0 01/03/2022 1234          No results found for: TSH  Lab Results   Component Value Date    INR 1.0 06/01/2021     Lab Results   Component Value Date    WBC 6.00 06/01/2021    HGB 15.2 06/01/2021    HCT 43.2 06/01/2021    MCV 96 06/01/2021     06/01/2021     BNP  @LABRCNTIP(BNP,BNPTRIAGEBLO)@  CrCl cannot be calculated (Patient's most recent lab result is older than the maximum 7 days allowed.).     Imaging:  ======  No results found for this or any previous visit.    No results found for this or any previous visit.    No results found for this or any previous visit.    No results found for this or any previous visit.    No results found for this or any previous visit.    No valid procedures specified.    Diagnostic Results:  EC[facility]       States he had a normal heart cath in 2010 with Dr Preciado       2021   Left Main   The vessel is large.   Left Anterior Descending   First Diagonal Branch   The vessel is large.   Ramus Intermedius   The vessel is large.   Left Circumflex   The vessel is large.   First Obtuse Marginal Branch   The vessel is large.   Right Coronary Artery   The  vessel is large.   Right Posterior Descending Artery   The vessel is large.   Right Posterior Atrioventricular Artery   The vessel is angiographically normal.   Intervention    No interventions have been documented.  Left Heart Findings    Left Ventricle    LVEDP (Pre):15 mmHG   Aortic Valve    No gradient on pullback   Right Heart Findings    Right Heart Pressures    RA: 8 RV: 38/ 8 PA: 36/ 17/ 22 PWP: 15 .   Cardiac output was 8. Cardiac index is 3.22 L/min/m2.   O2 Sat: PA 75%.       The ASCVD Risk score (April DK, et al., 2019) failed to calculate for the following reasons:    Cannot find a previous HDL lab    Cannot find a previous total cholesterol lab    Assessment and Plan:   Hyperlipidemia, unspecified hyperlipidemia type  -     pravastatin (PRAVACHOL) 80 MG tablet; Take 1 tablet (80 mg total) by mouth once daily.  Dispense: 90 tablet; Refill: 3    Paroxysmal atrial fibrillation  -     IN OFFICE EKG 12-LEAD (to Muse)    SINGH (dyspnea on exertion)  -     B-TYPE NATRIURETIC PEPTIDE; Future; Expected date: 06/15/2023  -     Basic metabolic panel; Future; Expected date: 06/15/2023  -     furosemide (LASIX) 40 MG tablet; Take 1 tablet (40 mg total) by mouth 2 (two) times daily.  Dispense: 180 tablet; Refill: 3    REGINA (obstructive sleep apnea)    H/O agent Orange exposure    Morbid obesity    Orthopnea    Hypertension, unspecified type  -     amLODIPine (NORVASC) 10 MG tablet; Take 1 tablet (10 mg total) by mouth once daily.  Dispense: 90 tablet; Refill: 3  -     bisoprolol (ZEBETA) 5 MG tablet; Take 1 tablet (5 mg total) by mouth once daily.  Dispense: 90 tablet; Refill: 3    Atrial fibrillation, unspecified type  Comments:  CHADSVASC 2, UNDESTADNS DIFFERENCE BETWEEN DOAC AND ASA VS NO AC  Orders:  -     apixaban (ELIQUIS) 5 mg Tab; Take 1 tablet (5 mg total) by mouth 2 (two) times daily.  Dispense: 180 tablet; Refill: 3  -     CBC Auto Differential; Future; Expected date: 06/15/2023  -     Protime-INR; Future;  Expected date: 06/15/2023    Acute on chronic diastolic heart failure  -     metOLazone (ZAROXOLYN) 5 MG tablet; Take 1 tablet (5 mg total) by mouth every 7 days.  Dispense: 10 tablet; Refill: 11        In AFib.  I believe this causing change in his symptoms.    He is compliant with the CPAP.    He is currently on bisoprolol.    Plan NATIVIDAD cardioversion Tuesday.    Will make sure he is taking his Lasix twice a day  Will start metolazone once a week.    Will start flecainide likely after his cardioversion  Reviewed all tests and above medical conditions with patient in detail and formulated treatment plan.  Risk factor modification discussed.   Cardiac low salt diet discussed.  Maintaining healthy weight and weight loss goals were discussed in clinic.      Follow up in 2 weeks after cardioversion

## 2023-06-15 NOTE — PROGRESS NOTES
Subjective:   Patient ID:  Lucas Garrison is a 72 y.o. male who presents for evaluation of Shortness of Breath      70-YEAR-OLD MALE WITH HISTORY OF HYPERTENSION, OBESITY, OBSTRUCTIVE SLEEP APNEA COMPLIANT WITH CPAP, HYPERLIPIDEMIA.  PATIENT IS A  [facility]  PATIENT WAS HAVING A ROUTINE COLONOSCOPY FOR SCREENING EARLIER TODAY.  HE WAS CAUGHT TO HAVE AFIB NOT AN RVR.  THE OS HE WAS REFERRED FOR EVALUATION.  HE DENIES ANY PALPITATIONS, SYNCOPE, PRESYNCOPE, DIZZINESS.  HE DOES REPORT ORTHOPNEA, DYSPNEA ON EXERTION AND LOWER EXTREMITY SWELLING.  HE ADMITS TO NO SALT RESTRICTION.  HE DENIES ANY CHEST PAIN, CLAUDICATION, BLEEDING, MELENA OR HEMATEMESIS,  HE STATES THAT HE HAD A NORMAL HEART CATHETERIZATION 10 YEARS AGO    5/10/2021   Feels better but still complains of SINGH , felt better after the mario/dccv  But lately singh again, with mild LE swelling, + mild orthopnea  ekg sinus bradycardia, non specific st t waves   Increase lasix, less salt     6/10/2021   Had abnormal stress test after last visit , cath was normal cors   Mildly elevated left sided pressure   Still feeling dyspnea   euvolemic on exam   No chest pain, in sinus rhythm   No other complaints except for insomnia   Cath site right radial and right antecubital no issues     Shortness of Breath  Associated symptoms include leg swelling. Pertinent negatives include no chest pain or syncope.   1.3.2021  DOING WELL.  Denies chest pain.  Still reports dyspnea on exertion however better compared to in the past.  NYHA 1.   Still not completely adherent with low salt  Known to have mildly elevated LVEDP on his catheterization last year.  With normal coronaries.  No palpitations, no bleeding while on Eliquis.  Not on aspirin.  Blood pressure not at goal states he was rushing to the clinic today.  Will switch his Toprol to bisoprolol.  He [facility] and he had [facility] as per the patient  himself however no documents  He is taking Lasix 40 mg twice a day.  Asking for  a new prescription before he runs out of the medication      6.30.2022  Doing well , had normal cath a year ago  No CP   TRONCOSO resolved after last med adjustments   Needs refill   SEE ROS     12.13.2022  Comes in for a 6 months follow up   Still has his chronic TRONCOSO, still [facility]  He has dependent lower ext edema , but also has orthopnea , troncoso and lvedp 15 mmhg on cath   Uses his lasix twice a day , states good diuresis   No chest pain   No palpitations  He states [facility] and not [facility] meds I reviewed the meds   No bleeding   Uses his CPAP   Normal cath 2021    6.15.2023  In for six-month follow-up.    Having dyspnea on exertion NYHA 2.  Orthopnea PND.  Worsened in the last 2-3 weeks.  On EKG his AFib rate control.    He states he is taking the Lasix only once a day  States he avoiding any salt.    Had normal catheterization 2021.    His last NATIVIDAD cardioversion 2021      Past Medical History:   Diagnosis Date    Cancer     DDD (degenerative disc disease)     H/O prostate cancer     HLD (hyperlipidemia)     Hypertension     Lumbago     REGINA (obstructive sleep apnea)     Peripheral neuropathy     PTSD (post-traumatic stress disorder)        Past Surgical History:   Procedure Laterality Date    CATHETERIZATION OF BOTH LEFT AND RIGHT HEART N/A 6/4/2021    Procedure: CATHETERIZATION, HEART, BOTH LEFT AND RIGHT;  Surgeon: Baldemar Davalos MD;  Location: [facility] CATH LAB;  Service: Cardiology;  Laterality: N/A;    COLONOSCOPY N/A 12/7/2016    Procedure: COLONOSCOPY;  Surgeon: Zeus Guerrier MD;  Location: [facility] ENDO;  Service: Endoscopy;  Laterality: N/A;    COLONOSCOPY N/A 2/22/2021    Procedure: COLONOSCOPY;  Surgeon: Joselyn Castanon MD;  Location: [facility] ENDO;  Service: Endoscopy;  Laterality: N/A;    CORONARY ANGIOGRAPHY N/A 6/4/2021    Procedure: ANGIOGRAM, CORONARY ARTERY;   Surgeon: Baldemar Davalos MD;  Location: [facility] CATH LAB;  Service: Cardiology;  Laterality: N/A;    prostate radiation seeds      RIGHT HEART CATHETERIZATION Right 2021    Procedure: INSERTION, CATHETER, RIGHT HEART;  Surgeon: Baldemar Davalos MD;  Location: [facility] CATH LAB;  Service: Cardiology;  Laterality: Right;    TONSILLECTOMY      TRANSURETHRAL RESECTION OF PROSTATE         Social History     Tobacco Use    Smoking status: Former     Packs/day: 1.50     Years: 10.00     Pack years: 15.00     Types: Cigarettes     Quit date: 10/30/1982     Years since quittin.6   Substance Use Topics    Alcohol use: No    Drug use: No       Family History   Problem Relation Age of Onset    COPD Mother     Depression Brother     Colon cancer Neg Hx        Review of Systems   Cardiovascular:  Positive for dyspnea on exertion and leg swelling. Negative for chest pain, palpitations and syncope.   Respiratory:  Positive for shortness of breath.    Genitourinary: Negative.    Neurological: Negative.      Current Outpatient Medications on File Prior to Visit   Medication Sig    amitriptyline (ELAVIL) 50 MG tablet Take 50 mg by mouth every evening.    aspirin (ECOTRIN) 81 MG EC tablet Take 81 mg by mouth once daily.    citalopram (CELEXA) 40 MG tablet Take 40 mg by mouth once daily.    EScitalopram oxalate (LEXAPRO) 20 MG tablet Take 20 mg by mouth once daily.    fish oil-omega-3 fatty acids 300-1,000 mg capsule Take 1 g by mouth once daily.    gabapentin (NEURONTIN) 600 MG tablet Take 600 mg by mouth.    loratadine (CLARITIN) 10 mg tablet Take 10 mg by mouth once daily.    losartan (COZAAR) 100 MG tablet Take 1 tablet (100 mg total) by mouth once daily.    methocarbamol (ROBAXIN) 750 MG Tab Take 500 mg by mouth 3 (three) times daily.    mirtazapine (REMERON) 30 MG tablet Take 30 mg by mouth every evening.    multivitamin capsule Take 1 capsule by mouth once daily.    naproxen (NAPROSYN) 500 MG tablet Take 500 mg by mouth  2 (two) times daily with meals.    omeprazole (PRILOSEC) 20 MG capsule Take 20 mg by mouth once daily.    prazosin (MINIPRESS) 2 MG Cap Take 2 mg by mouth once daily.    prazosin (MINIPRESS) 5 MG capsule Take 5 mg by mouth 2 (two) times daily.    QUEtiapine (SEROQUEL) 50 MG tablet Take 50 mg by mouth nightly. One-half tablet    traZODone (DESYREL) 50 MG tablet Take 50 mg by mouth every evening.    zolpidem (AMBIEN) 5 MG Tab Take 1 tablet (5 mg total) by mouth nightly as needed (INSOMNIA). (Patient taking differently: Take 10 mg by mouth nightly as needed (INSOMNIA).)    [DISCONTINUED] amLODIPine (NORVASC) 10 MG tablet Take 1 tablet (10 mg total) by mouth once daily.    [DISCONTINUED] apixaban (ELIQUIS) 5 mg Tab Take 1 tablet (5 mg total) by mouth 2 (two) times daily.    [DISCONTINUED] bisoprolol (ZEBETA) 5 MG tablet Take 1 tablet (5 mg total) by mouth once daily.    [DISCONTINUED] furosemide (LASIX) 40 MG tablet Take 1 tablet (40 mg total) by mouth 2 (two) times daily.    [DISCONTINUED] pravastatin (PRAVACHOL) 80 MG tablet Take 80 mg by mouth once daily.     No current facility-administered medications on file prior to visit.       Objective:   Objective:  Wt Readings from Last 3 Encounters:   06/15/23 (!) 149.9 kg (330 lb 7.5 oz)   12/13/22 (!) 142.9 kg (315 lb 0.6 oz)   06/30/22 135 kg (297 lb 9.9 oz)     Temp Readings from Last 3 Encounters:   06/04/21 97.9 °F (36.6 °C)   04/06/21 98.1 °F (36.7 °C)   02/22/21 97.9 °F (36.6 °C) (Temporal)     BP Readings from Last 3 Encounters:   06/15/23 118/82   12/13/22 118/78   06/30/22 112/74     Pulse Readings from Last 3 Encounters:   06/15/23 77   12/13/22 80   06/30/22 (!) 56       Physical Exam  Vitals reviewed.   Constitutional:       Appearance: He is well-developed.   Neck:      Vascular: No carotid bruit.   Cardiovascular:      Rate and Rhythm: Normal rate and regular rhythm.      Pulses: Intact distal pulses.      Heart sounds: Normal heart sounds. No murmur  heard.  Pulmonary:      Breath sounds: Normal breath sounds.   Musculoskeletal:         General: Swelling present.   Neurological:      Mental Status: He is oriented to person, place, and time.       No results found for: CHOL  No results found for: HDL  No results found for: LDLCALC  No results found for: TRIG  No results found for: CHOLHDL    Chemistry        Component Value Date/Time     01/03/2022 1234    K 4.1 01/03/2022 1234     01/03/2022 1234    CO2 27 01/03/2022 1234    BUN 15 01/03/2022 1234    CREATININE 1.2 01/03/2022 1234    GLU 87 01/03/2022 1234        Component Value Date/Time    CALCIUM 9.4 01/03/2022 1234    ALKPHOS 52 (L) 04/06/2021 0729    AST 32 04/06/2021 0729    ALT 45 (H) 04/06/2021 0729    BILITOT 0.7 04/06/2021 0729    ESTGFRAFRICA >60.0 01/03/2022 1234    EGFRNONAA >60.0 01/03/2022 1234          No results found for: TSH  Lab Results   Component Value Date    INR 1.0 06/01/2021     Lab Results   Component Value Date    WBC 6.00 06/01/2021    HGB 15.2 06/01/2021    HCT 43.2 06/01/2021    MCV 96 06/01/2021     06/01/2021     BNP  @LABRCNTIP(BNP,BNPTRIAGEBLO)@  CrCl cannot be calculated (Patient's most recent lab result is older than the maximum 7 days allowed.).     Imaging:  ======  No results found for this or any previous visit.    No results found for this or any previous visit.    No results found for this or any previous visit.    No results found for this or any previous visit.    No results found for this or any previous visit.    No valid procedures specified.    Diagnostic Results:  [facility]       States he had a normal heart cath in 2010 with Dr Preciado       2021   Left Main   The vessel is large.   Left Anterior Descending   First Diagonal Branch   The vessel is large.   Ramus Intermedius   The vessel is large.   Left Circumflex   The vessel is large.   First Obtuse Marginal Branch   The vessel is large.   Right Coronary Artery   The  vessel is large.   Right Posterior Descending Artery   The vessel is large.   Right Posterior Atrioventricular Artery   The vessel is angiographically normal.   Intervention    No interventions have been documented.  Left Heart Findings    Left Ventricle    LVEDP (Pre):15 mmHG   Aortic Valve    No gradient on pullback   Right Heart Findings    Right Heart Pressures    RA: 8 RV: 38/ 8 PA: 36/ 17/ 22 PWP: 15 .   Cardiac output was 8. Cardiac index is 3.22 L/min/m2.   O2 Sat: PA 75%.       The ASCVD Risk score (April DK, et al., 2019) failed to calculate for the following reasons:    Cannot find a previous HDL lab    Cannot find a previous total cholesterol lab    Assessment and Plan:   Hyperlipidemia, unspecified hyperlipidemia type  -     pravastatin (PRAVACHOL) 80 MG tablet; Take 1 tablet (80 mg total) by mouth once daily.  Dispense: 90 tablet; Refill: 3    Paroxysmal atrial fibrillation  -     IN OFFICE EKG 12-LEAD (to Muse)    SINGH (dyspnea on exertion)  -     B-TYPE NATRIURETIC PEPTIDE; Future; Expected date: 06/15/2023  -     Basic metabolic panel; Future; Expected date: 06/15/2023  -     furosemide (LASIX) 40 MG tablet; Take 1 tablet (40 mg total) by mouth 2 (two) times daily.  Dispense: 180 tablet; Refill: 3    REGINA (obstructive sleep apnea)    H/O agent Orange exposure    Morbid obesity    Orthopnea    Hypertension, unspecified type  -     amLODIPine (NORVASC) 10 MG tablet; Take 1 tablet (10 mg total) by mouth once daily.  Dispense: 90 tablet; Refill: 3  -     bisoprolol (ZEBETA) 5 MG tablet; Take 1 tablet (5 mg total) by mouth once daily.  Dispense: 90 tablet; Refill: 3    Atrial fibrillation, unspecified type  Comments:  CHADSVASC 2, UNDESTADNS DIFFERENCE BETWEEN DOAC AND ASA VS NO AC  Orders:  -     apixaban (ELIQUIS) 5 mg Tab; Take 1 tablet (5 mg total) by mouth 2 (two) times daily.  Dispense: 180 tablet; Refill: 3  -     CBC Auto Differential; Future; Expected date: 06/15/2023  -     Protime-INR; Future;  Expected date: 06/15/2023    Acute on chronic diastolic heart failure  -     metOLazone (ZAROXOLYN) 5 MG tablet; Take 1 tablet (5 mg total) by mouth every 7 days.  Dispense: 10 tablet; Refill: 11        In AFib.  I believe this causing change in his symptoms.    He is compliant with the CPAP.    He is currently on bisoprolol.    Plan NATIVDIAD cardioversion Tuesday.    Will make sure he is taking his Lasix twice a day  Will start metolazone once a week.    Will start flecainide likely after his cardioversion  Reviewed all tests and above medical conditions with patient in detail and formulated treatment plan.  Risk factor modification discussed.   Cardiac low salt diet discussed.  Maintaining healthy weight and weight loss goals were discussed in clinic.      Follow up in 2 weeks after cardioversion

## 2023-06-16 ENCOUNTER — TELEPHONE (OUTPATIENT)
Dept: CARDIOLOGY | Facility: CLINIC | Age: 73
End: 2023-06-16
Payer: MEDICARE

## 2023-06-16 NOTE — TELEPHONE ENCOUNTER
Spoke with [place] [facility] in regards to new referral needing to be sent in due to old one expiring on 23. They will fax over new form that can be filled out so pt can continue to get care.              ----- Message from Roseann Jimenez sent at 2023  1:17 PM CDT -----  Contact:  OFFICE   OFFICE is calling to inform provider that a request in to be put in place for more visits due to [place]  current/referral  on 2023. Please give a call back at [phone] and the Community Care Line.  Thanks  LR

## 2023-06-19 ENCOUNTER — TELEPHONE (OUTPATIENT)
Dept: CARDIOLOGY | Facility: CLINIC | Age: 73
End: 2023-06-19
Payer: MEDICARE

## 2023-06-19 NOTE — TELEPHONE ENCOUNTER
Contacted patient; Patient wanted to know was his NATIVIDAD procedure approved; Advised patient not yet but should be [facility] and [facility] for [facility] but was also previously approved but Auth date . Patient understood with no questions or concerns.       ----- Message from Parisa Lozano sent at 2023  1:17 PM CDT -----  Contact: [phone]  Patient is requesting a call back regarding an appointment he has with you all tomorrow that Poonam called him on the . Please call him back at [phone]. Thanks

## 2023-06-20 ENCOUNTER — ANESTHESIA EVENT (OUTPATIENT)
Dept: CARDIOLOGY | Facility: HOSPITAL | Age: 73
End: 2023-06-20
Payer: MEDICARE

## 2023-06-20 ENCOUNTER — ANESTHESIA (OUTPATIENT)
Dept: CARDIOLOGY | Facility: HOSPITAL | Age: 73
End: 2023-06-20
Payer: MEDICARE

## 2023-06-20 ENCOUNTER — HOSPITAL ENCOUNTER (OUTPATIENT)
Facility: HOSPITAL | Age: 73
Discharge: HOME | End: 2023-06-20
Attending: INTERNAL MEDICINE | Admitting: INTERNAL MEDICINE
Payer: MEDICARE

## 2023-06-20 DIAGNOSIS — I48.91 AFIB: ICD-10-CM

## 2023-06-20 DIAGNOSIS — I48.0 PAF (PAROXYSMAL ATRIAL FIBRILLATION): ICD-10-CM

## 2023-06-20 LAB
BSA FOR ECHO PROCEDURE: 2.74 M2
EJECTION FRACTION: 60 %
RA PRESSURE: 3 MMHG

## 2023-06-20 PROCEDURE — 93005 ELECTROCARDIOGRAM TRACING: CPT

## 2023-06-20 PROCEDURE — 63600175 PHARM REV CODE 636 W HCPCS: Performed by: NURSE ANESTHETIST, CERTIFIED REGISTERED

## 2023-06-20 PROCEDURE — 37000009 HC ANESTHESIA EA ADD 15 MINS: Performed by: INTERNAL MEDICINE

## 2023-06-20 PROCEDURE — 93010 ELECTROCARDIOGRAM REPORT: CPT | Mod: ,,, | Performed by: INTERNAL MEDICINE

## 2023-06-20 PROCEDURE — 93010 EKG 12-LEAD: ICD-10-PCS | Mod: ,,, | Performed by: INTERNAL MEDICINE

## 2023-06-20 PROCEDURE — 25000003 PHARM REV CODE 250: Performed by: INTERNAL MEDICINE

## 2023-06-20 PROCEDURE — 37000008 HC ANESTHESIA 1ST 15 MINUTES: Performed by: INTERNAL MEDICINE

## 2023-06-20 RX ORDER — FLECAINIDE ACETATE 50 MG/1
50 TABLET ORAL EVERY 12 HOURS
Qty: 60 TABLET | Refills: 11 | Status: SHIPPED | OUTPATIENT
Start: 2023-06-20 | End: 2023-07-06

## 2023-06-20 RX ORDER — METOPROLOL SUCCINATE 25 MG/1
25 TABLET, EXTENDED RELEASE ORAL DAILY
Qty: 30 TABLET | Refills: 11 | Status: SHIPPED | OUTPATIENT
Start: 2023-06-20 | End: 2023-07-06

## 2023-06-20 RX ORDER — PROPOFOL 10 MG/ML
VIAL (ML) INTRAVENOUS
Status: DISCONTINUED | OUTPATIENT
Start: 2023-06-20 | End: 2023-06-20

## 2023-06-20 RX ADMIN — PROPOFOL 50 MG: 10 INJECTION, EMULSION INTRAVENOUS at 01:06

## 2023-06-20 RX ADMIN — PROPOFOL 70 MG: 10 INJECTION, EMULSION INTRAVENOUS at 12:06

## 2023-06-20 NOTE — BRIEF OP NOTE
[facility] - Cath Lab (Hospital)  Brief Operative Note  Cardiology    SUMMARY     Surgery Date: 6/20/2023     Surgeon(s) and Role:     * Baldemar Davalos MD - Primary    Assisting Surgeon: None    Pre-op Diagnosis:  PAF (paroxysmal atrial fibrillation) [I48.0]    Post-op Diagnosis: Post-Op Diagnosis Codes:     * PAF (paroxysmal atrial fibrillation) [I48.0]    Procedure Performed:     Procedure(s) (LRB):  Transesophageal echo (NATIVIDAD) intra-procedure log documentation (N/A)    Technical Procedures Used:     Operative Findings:   SUCCESSFUL NATIVIDAD DCCV   INITIALLY IN BIGEMINY RIGHT AFTER HOWEVER LATER WAS IN SINUS RHYTHM AROUND 65 BPM     Estimated Blood Loss: * No values recorded between 6/20/2023 12:17 PM and 6/20/2023 12:55 PM *         Specimens:   Specimen (24h ago, onward)      None

## 2023-06-20 NOTE — TRANSFER OF CARE
Anesthesia Transfer of Care Note    Patient: Lucas Garrison    Procedure(s) Performed: Procedure(s) (LRB):  Transesophageal echo (NATIVIDAD) intra-procedure log documentation (N/A)    Patient location: Other: ru    Anesthesia Type: MAC    Post pain: adequate analgesia    Post assessment: no apparent anesthetic complications    Post vital signs: stable    Level of consciousness: awake and alert    Nausea/Vomiting: no nausea/vomiting    Complications: none    Transfer of care protocol was followed      Last vitals: There were no vitals taken for this visit.

## 2023-06-20 NOTE — ANESTHESIA POSTPROCEDURE EVALUATION
Anesthesia Post Evaluation    Patient: Lucas Garrison    Procedure(s) Performed: Procedure(s) (LRB):  Transesophageal echo (NATIVIDAD) intra-procedure log documentation (N/A)    Final Anesthesia Type: MAC      Patient location during evaluation: PACU  Patient participation: Yes- Able to Participate  Level of consciousness: awake and alert and oriented  Post-procedure vital signs: reviewed and stable  Pain management: adequate  Airway patency: patent  REGINA mitigation strategies: Multimodal analgesia  PONV status at discharge: No PONV  Anesthetic complications: no      Cardiovascular status: blood pressure returned to baseline and hemodynamically stable  Respiratory status: unassisted  Hydration status: euvolemic  Follow-up not needed.          Vitals Value Taken Time   /55 06/20/23 1417   Temp 36.5 °C (97.7 °F) 06/20/23 1300   Pulse 47 06/20/23 1424   Resp 14 06/20/23 1424   SpO2 96 % 06/20/23 1424   Vitals shown include unvalidated device data.      No case tracking events are documented in the log.      Pain/Jay Score: Jay Score: 10 (6/20/2023  1:00 PM)

## 2023-06-20 NOTE — DISCHARGE INSTRUCTIONS
Post Cardioversion Discharge Instructions    ACTIVITY LEVEL  You may feel sleepy for several hours.  Rest until you are more awake.  Gradually resume your normal activities over the next 24 hours.   For the next 8 hours, you should be watched by a responsible adult. This person should make sure your condition is not getting worse.   Don't drink any alcohol for the next 24 hours.  Don't drive, operate dangerous machinery, or make important business or personal decisions during the next 24 hours.  Your healthcare provider may tell you not to take any medicine by mouth for pain or sleep in the next 4 hours. These medicines may react with the medicines you were given in the hospital. This could cause a much stronger response than usual.     DIET  At home, begin with liquids and then progress to normal foods if you don't experience nausea.    MEDICATIONS  Continue home medications as before procedure.  You may take Tylenol every four hours as needed for minor discomfort at pad sites or chest soreness.  If you take Coumadin, resume your regimen and continue to have your blood tested weekly as directed by your physician.    FOLLOW UP  You will be scheduled for a follow up appointment and EKG within two weeks of your procedure.            CALL YOUR HEALTHCARE PROVIDER IF YOU START TO HAVE THE FOLLOWING SYMPTOMS:        1.  Drowsiness that doesn't get better       2. Weakness or dizziness that doesn't get better            3. Repeated vomiting

## 2023-06-20 NOTE — PLAN OF CARE
Right FA saline lock discontinued with canula intact; tolerated well.  An hour post admin of lidocaine, bedside swallow assessment performed; no s/s of aspiration at this time.   Discharge instructions, home medication list, and post discharge follow up appointments discussed with pt and wife.  Discharged to wife, via wheelchair, in no apparent distress. All personal belongings taken with pt. Encouraged continued compliance with MD directives.

## 2023-06-20 NOTE — ANESTHESIA PREPROCEDURE EVALUATION
06/20/2023  Lucas Garrison is a 72 y.o., male.  Patient Active Problem List   Diagnosis    Hx of colonic polyps    Screen for colon cancer    Atrial fibrillation    Abnormal stress test       Pre-op Assessment    I have reviewed the Patient Summary Reports.    I have reviewed the Nursing Notes. I have reviewed the NPO Status.   I have reviewed the Medications.     Review of Systems  Anesthesia Hx:  History of prior surgery of interest to airway management or planning: Denies Family Hx of Anesthesia complications.   Denies Personal Hx of Anesthesia complications.   Social:  Former Smoker, No Alcohol Use    Hematology/Oncology:        Current/Recent Cancer. Other (see Oncology comments) radiation Oncology Comments: prostate    Cardiovascular:   Hypertension Dysrhythmias atrial fibrillation hyperlipidemia    Pulmonary:   Sleep Apnea    Education provided regarding risk of obstructive sleep apnea     Musculoskeletal:   Arthritis     Neurological:   Neuromuscular Disease,   Peripheral Neuropathy    Psych:   Psychiatric History anxiety (PTSD) PTSD         Physical Exam  General:  Morbid Obesity      Airway/Jaw/Neck:  Airway Findings: Mouth Opening: Normal   Tongue: Normal   General Airway Assessment: Adult Mallampati: IV  Improves to III with phonation.  TM Distance: Normal, at least 6 cm      Eyes/Ears/Nose:  Eyes/Ears/Nose Findings:    Dental:  Dental Findings: In tact     Chest/Lungs:  Chest/Lungs Clear    Heart/Vascular:  Heart Findings: Normal       Mental Status:  Mental Status Findings:  Cooperative, Alert and Oriented         Anesthesia Plan  Type of Anesthesia, risks & benefits discussed:  Anesthesia Type:  MAC    Patient's Preference:   Plan Factors:          Intra-op Monitoring Plan: standard ASA monitors  Intra-op Monitoring Plan Comments:   Post Op Pain Control Plan: per primary service following  discharge from PACU  Post Op Pain Control Plan Comments:     Induction:   IV  Beta Blocker:  Patient is on a Beta-Blocker and has received one dose within the past 24 hours (No further documentation required).       Informed Consent: Informed consent signed with the Patient and all parties understand the risks and agree with anesthesia plan.  All questions answered.  Anesthesia consent signed with patient.  ASA Score: 3     Day of Surgery Review of History & Physical: I have interviewed and examined the patient. I have reviewed the patient's H&P dated:  There are no significant changes.            Ready For Surgery From Anesthesia Perspective.           Physical Exam  General: Morbid Obesity    Airway:  Mallampati: IV / III  Mouth Opening: Normal  TM Distance: Normal, at least 6 cm  Tongue: Normal    Dental:  In tact          Anesthesia Plan  Type of Anesthesia, risks & benefits discussed:    Anesthesia Type: MAC  Intra-op Monitoring Plan: standard ASA monitors  Post Op Pain Control Plan: per primary service following discharge from PACU  Induction:  IV  Informed Consent: Informed consent signed with the Patient and all parties understand the risks and agree with anesthesia plan.  All questions answered.   ASA Score: 3  Day of Surgery Review of History & Physical: I have interviewed and examined the patient. I have reviewed the patient's H&P dated:     Ready For Surgery From Anesthesia Perspective.       .

## 2023-06-21 NOTE — DISCHARGE SUMMARY
[facility]  Cardiology  Discharge Summary      Patient Name: Lucas Garrison  MRN: [id number]  Admission Date: 6/20/2023  Hospital Length of Stay: 0 days  Discharge Date and Time: 6/20/2023  2:31 PM  Attending Physician: No att. providers found    Discharging Provider: Baldemar Davalos MD  Primary Care Physician: [facility]    HPI:   No notes on file    Procedure(s) (LRB):  Transesophageal echo (MARIO) intra-procedure log documentation (N/A)     Indwelling Lines/Drains at time of discharge:  Lines/Drains/Airways       Airway  Duration                  Airway - Non-Surgical 02/22/21 1129 Nasal Cannula 848 days                    Hospital Course:  No notes on file    Goals of Care Treatment Preferences:  Code Status: Full Code      Consults:     Significant Diagnostic Studies: mario dccv       Pending Diagnostic Studies:       None            There are no hospital problems to display for this patient.    No new Assessment & Plan notes have been filed under this hospital service since the last note was generated.  Service: Cardiology      Discharged Condition: good    Disposition: Home or Self Care    Follow Up:    Patient Instructions:   No discharge procedures on file.  Medications:  Reconciled Home Medications:      Medication List        START taking these medications      flecainide 50 MG Tab  Commonly known as: TAMBOCOR  Take 1 tablet (50 mg total) by mouth every 12 (twelve) hours.     metoprolol succinate 25 MG 24 hr tablet  Commonly known as: TOPROL-XL  Take 1 tablet (25 mg total) by mouth once daily.            STOP taking these medications      bisoprolol 5 MG tablet  Commonly known as: ZEBETA     metOLazone 5 MG tablet  Commonly known as: ZAROXOLYN            ASK your doctor about these medications      amitriptyline 50 MG tablet  Commonly known as: ELAVIL  Take 50 mg by mouth every evening.     amLODIPine 10 MG tablet  Commonly known as: NORVASC  Take 1 tablet (10  mg total) by mouth once daily.     apixaban 5 mg Tab  Commonly known as: ELIQUIS  Take 1 tablet (5 mg total) by mouth 2 (two) times daily.     aspirin 81 MG EC tablet  Commonly known as: ECOTRIN  Take 81 mg by mouth once daily.     citalopram 40 MG tablet  Commonly known as: CeleXA  Take 40 mg by mouth once daily.     EScitalopram oxalate 20 MG tablet  Commonly known as: LEXAPRO  Take 20 mg by mouth once daily.     fish oil-omega-3 fatty acids 300-1,000 mg capsule  Take 1 g by mouth once daily.     furosemide 40 MG tablet  Commonly known as: LASIX  Take 1 tablet (40 mg total) by mouth 2 (two) times daily.     gabapentin 600 MG tablet  Commonly known as: NEURONTIN  Take 600 mg by mouth.     loratadine 10 mg tablet  Commonly known as: CLARITIN  Take 10 mg by mouth once daily.     losartan 100 MG tablet  Commonly known as: COZAAR  Take 1 tablet (100 mg total) by mouth once daily.     methocarbamoL 750 MG Tab  Commonly known as: ROBAXIN  Take 500 mg by mouth 3 (three) times daily.     mirtazapine 30 MG tablet  Commonly known as: REMERON  Take 30 mg by mouth every evening.     multivitamin capsule  Take 1 capsule by mouth once daily.     naproxen 500 MG tablet  Commonly known as: NAPROSYN  Take 500 mg by mouth 2 (two) times daily with meals.     omeprazole 20 MG capsule  Commonly known as: PRILOSEC  Take 20 mg by mouth once daily.     pravastatin 80 MG tablet  Commonly known as: PRAVACHOL  Take 1 tablet (80 mg total) by mouth once daily.     * prazosin 2 MG Cap  Commonly known as: MINIPRESS  Take 2 mg by mouth once daily.     * prazosin 5 MG capsule  Commonly known as: MINIPRESS  Take 5 mg by mouth 2 (two) times daily.     QUEtiapine 50 MG tablet  Commonly known as: SEROQUEL  Take 50 mg by mouth nightly. One-half tablet     traZODone 50 MG tablet  Commonly known as: DESYREL  Take 50 mg by mouth every evening.     zolpidem 5 MG Tab  Commonly known as: AMBIEN  Take 1 tablet (5 mg total) by mouth nightly as needed  (INSOMNIA).           * This list has 2 medication(s) that are the same as other medications prescribed for you. Read the directions carefully, and ask your doctor or other care provider to review them with you.                  Time spent on the discharge of patient: 30 minutes    Baldemar Davalos MD  Cardiology  [facility] ([facility])

## 2023-06-26 VITALS
SYSTOLIC BLOOD PRESSURE: 114 MMHG | BODY MASS INDEX: 44.1 KG/M2 | OXYGEN SATURATION: 96 % | HEART RATE: 55 BPM | DIASTOLIC BLOOD PRESSURE: 55 MMHG | TEMPERATURE: 98 F | WEIGHT: 315 LBS | RESPIRATION RATE: 14 BRPM | HEIGHT: 71 IN

## 2023-07-05 DIAGNOSIS — I10 HYPERTENSION, UNSPECIFIED TYPE: Primary | ICD-10-CM

## 2023-07-06 ENCOUNTER — HOSPITAL ENCOUNTER (OUTPATIENT)
Dept: CARDIOLOGY | Facility: HOSPITAL | Age: 73
Discharge: HOME | End: 2023-07-06
Attending: INTERNAL MEDICINE
Payer: MEDICARE

## 2023-07-06 ENCOUNTER — OFFICE VISIT (OUTPATIENT)
Dept: CARDIOLOGY | Facility: CLINIC | Age: 73
End: 2023-07-06
Payer: MEDICARE

## 2023-07-06 VITALS
HEART RATE: 82 BPM | SYSTOLIC BLOOD PRESSURE: 118 MMHG | OXYGEN SATURATION: 96 % | BODY MASS INDEX: 43.97 KG/M2 | BODY MASS INDEX: 44.1 KG/M2 | WEIGHT: 315 LBS | WEIGHT: 315 LBS | DIASTOLIC BLOOD PRESSURE: 72 MMHG | HEIGHT: 71 IN

## 2023-07-06 DIAGNOSIS — I10 HYPERTENSION, UNSPECIFIED TYPE: ICD-10-CM

## 2023-07-06 DIAGNOSIS — I48.0 PAROXYSMAL ATRIAL FIBRILLATION: Primary | ICD-10-CM

## 2023-07-06 DIAGNOSIS — R06.09 DOE (DYSPNEA ON EXERTION): ICD-10-CM

## 2023-07-06 DIAGNOSIS — Z77.098 H/O AGENT ORANGE EXPOSURE: ICD-10-CM

## 2023-07-06 DIAGNOSIS — G47.33 OSA (OBSTRUCTIVE SLEEP APNEA): ICD-10-CM

## 2023-07-06 DIAGNOSIS — E66.01 MORBID OBESITY: ICD-10-CM

## 2023-07-06 PROCEDURE — 99999 PR PBB SHADOW E&M-EST. PATIENT-LVL V: CPT | Mod: PBBFAC,,, | Performed by: INTERNAL MEDICINE

## 2023-07-06 PROCEDURE — 99214 PR OFFICE/OUTPT VISIT, EST, LEVL IV, 30-39 MIN: ICD-10-PCS | Mod: S$PBB,,, | Performed by: INTERNAL MEDICINE

## 2023-07-06 PROCEDURE — 93010 EKG 12-LEAD: ICD-10-PCS | Mod: ,,, | Performed by: STUDENT IN AN ORGANIZED HEALTH CARE EDUCATION/TRAINING PROGRAM

## 2023-07-06 PROCEDURE — 93005 ELECTROCARDIOGRAM TRACING: CPT

## 2023-07-06 PROCEDURE — 99999 PR PBB SHADOW E&M-EST. PATIENT-LVL V: ICD-10-PCS | Mod: PBBFAC,,, | Performed by: INTERNAL MEDICINE

## 2023-07-06 PROCEDURE — 93010 ELECTROCARDIOGRAM REPORT: CPT | Mod: ,,, | Performed by: STUDENT IN AN ORGANIZED HEALTH CARE EDUCATION/TRAINING PROGRAM

## 2023-07-06 PROCEDURE — 99214 OFFICE O/P EST MOD 30 MIN: CPT | Mod: S$PBB,,, | Performed by: INTERNAL MEDICINE

## 2023-07-06 PROCEDURE — 99215 OFFICE O/P EST HI 40 MIN: CPT | Mod: PBBFAC | Performed by: INTERNAL MEDICINE

## 2023-07-06 RX ORDER — ALBUTEROL SULFATE 90 UG/1
AEROSOL, METERED RESPIRATORY (INHALATION)
COMMUNITY
Start: 2023-06-12

## 2023-07-06 RX ORDER — CHLORHEXIDINE GLUCONATE 40 MG/ML
SOLUTION TOPICAL
COMMUNITY
Start: 2022-10-19

## 2023-07-06 RX ORDER — CLOBETASOL PROPIONATE 0.46 MG/ML
SOLUTION TOPICAL
COMMUNITY
Start: 2022-10-19

## 2023-07-06 RX ORDER — FLECAINIDE ACETATE 100 MG/1
100 TABLET ORAL EVERY 12 HOURS
Qty: 60 TABLET | Refills: 11 | Status: SHIPPED | OUTPATIENT
Start: 2023-07-06 | End: 2023-07-27

## 2023-07-06 RX ORDER — METOPROLOL SUCCINATE 25 MG/1
25 TABLET, EXTENDED RELEASE ORAL DAILY
Qty: 90 TABLET | Refills: 3 | Status: SHIPPED | OUTPATIENT
Start: 2023-07-06 | End: 2023-07-27

## 2023-07-06 RX ORDER — FUROSEMIDE 80 MG/1
80 TABLET ORAL 2 TIMES DAILY
Qty: 180 TABLET | Refills: 3 | OUTPATIENT
Start: 2023-07-06 | End: 2023-07-27

## 2023-07-06 NOTE — PROGRESS NOTES
Subjective:   Patient ID:  Lucas Garrison is a 72 y.o. male who presents for evaluation of No chief complaint on file.      70-YEAR-OLD MALE WITH HISTORY OF HYPERTENSION, OBESITY, OBSTRUCTIVE SLEEP APNEA COMPLIANT WITH CPAP, HYPERLIPIDEMIA.  PATIENT IS A  [facility]  PATIENT WAS HAVING A ROUTINE COLONOSCOPY FOR SCREENING EARLIER TODAY.  HE WAS CAUGHT TO HAVE AFIB NOT AN RVR.  THE OS HE WAS REFERRED FOR EVALUATION.  HE DENIES ANY PALPITATIONS, SYNCOPE, PRESYNCOPE, DIZZINESS.  HE DOES REPORT ORTHOPNEA, DYSPNEA ON EXERTION AND LOWER EXTREMITY SWELLING.  HE ADMITS TO NO SALT RESTRICTION.  HE DENIES ANY CHEST PAIN, CLAUDICATION, BLEEDING, MELENA OR HEMATEMESIS,  HE STATES THAT HE HAD A NORMAL HEART CATHETERIZATION 10 YEARS AGO    5/10/2021   Feels better but still complains of SINGH , felt better after the mario/dccv  But lately singh again, with mild LE swelling, + mild orthopnea  ekg sinus bradycardia, non specific st t waves   Increase lasix, less salt     6/10/2021   Had abnormal stress test after last visit , cath was normal cors   Mildly elevated left sided pressure   Still feeling dyspnea   euvolemic on exam   No chest pain, in sinus rhythm   No other complaints except for insomnia   Cath site right radial and right antecubital no issues     Shortness of Breath  Associated symptoms include leg swelling. Pertinent negatives include no chest pain or syncope.   1.3.2021  DOING WELL.  Denies chest pain.  Still reports dyspnea on exertion however better compared to in the past.  NYHA 1.   Still not completely adherent with low salt  Known to have mildly elevated LVEDP on his catheterization last year.  With normal coronaries.  No palpitations, no bleeding while on Eliquis.  Not on aspirin.  Blood pressure not at goal states he was rushing to the clinic today.  Will switch his Toprol to bisoprolol.  He states that [facility] and he had [facility] as per the  patient himself however no documents  He is taking Lasix 40 mg twice a day.  Asking for  a new prescription before he runs out of the medication      6.30.2022  Doing well , had normal cath a year ago  No CP   SINGH resolved after last med adjustments   Needs refill   SEE [facility]     12.13.2022  Comes in for a 6 months follow up   Still has his chronic SINGH, still [facility]  He has dependent lower ext edema , but also has orthopnea , singh and lvedp 15 mmhg on cath   Uses his lasix twice a day , states good diuresis   No chest pain   No palpitations  He states [facility] and not [facility] his meds I reviewed the meds   No bleeding   Uses his CPAP   Normal cath 2021    6.15.2023  In for six-month follow-up.    Having dyspnea on exertion NYHA 2.  Orthopnea PND.  Worsened in the last 2-3 weeks.  On EKG his AFib rate control.    He states he is taking the Lasix only once a day  States he avoiding any salt.    Had normal catheterization 2021.    His last NATIVIDAD cardioversion 2021 7.6.2023.  Had recent NATIVIDAD cardioversion 2 weeks ago, while on Toprol.  Started on flecainide post cardioversion.    He is currently back in AFib.    Uses his CPAP nightly.    He states that he feels better since his cardioversion and has not gotten worse lately.    However he is noted to have lower extremity swelling.    He states that he already takes Lasix 80 mg in the morning and 40 mg in the evening.  States he is able to mow his lawn.  Feels better than when he was seen in clinic before his cardioversion.  Denies palpitations.      Past Medical History:   Diagnosis Date    Cancer     DDD (degenerative disc disease)     H/O prostate cancer     HLD (hyperlipidemia)     Hypertension     Lumbago     REGINA (obstructive sleep apnea)     Peripheral neuropathy     PTSD (post-traumatic stress disorder)        Past Surgical History:   Procedure Laterality Date    CATHETERIZATION OF BOTH LEFT AND RIGHT HEART N/A  2021    Procedure: CATHETERIZATION, HEART, BOTH LEFT AND RIGHT;  Surgeon: Baldemar Davalos MD;  Location: [facility] CATH LAB;  Service: Cardiology;  Laterality: N/A;    COLONOSCOPY N/A 2016    Procedure: COLONOSCOPY;  Surgeon: Zeus Guerrier MD;  Location: [facility] ENDO;  Service: Endoscopy;  Laterality: N/A;    COLONOSCOPY N/A 2021    Procedure: COLONOSCOPY;  Surgeon: Joselyn Castanon MD;  Location: [facility] ENDO;  Service: Endoscopy;  Laterality: N/A;    CORONARY ANGIOGRAPHY N/A 2021    Procedure: ANGIOGRAM, CORONARY ARTERY;  Surgeon: Baldemar Davalos MD;  Location: [facility] CATH LAB;  Service: Cardiology;  Laterality: N/A;    prostate radiation seeds      RIGHT HEART CATHETERIZATION Right 2021    Procedure: INSERTION, CATHETER, RIGHT HEART;  Surgeon: Baldemar Davalos MD;  Location: [facility] CATH LAB;  Service: Cardiology;  Laterality: Right;    TONSILLECTOMY      TRANSESOPHAGEAL ECHOCARDIOGRAM WITH POSSIBLE CARDIOVERSION (NATIVIDAD W/ POSS CARDIOVERSION) N/A 2023    Procedure: Transesophageal echo (NATIVIDAD) intra-procedure log documentation;  Surgeon: Baldemar Davalos MD;  Location: [facility] CATH LAB;  Service: Cardiology;  Laterality: N/A;    TRANSURETHRAL RESECTION OF PROSTATE         Social History     Tobacco Use    Smoking status: Former     Packs/day: 1.50     Years: 10.00     Pack years: 15.00     Types: Cigarettes     Quit date: 10/30/1982     Years since quittin.7   Substance Use Topics    Alcohol use: No    Drug use: No       Family History   Problem Relation Age of Onset    COPD Mother     Depression Brother     Colon cancer Neg Hx        Review of Systems   Cardiovascular:  Positive for leg swelling. Negative for chest pain, dyspnea on exertion, palpitations and syncope.   Respiratory:  Negative for shortness of breath.    Genitourinary: Negative.    Neurological: Negative.      Current Outpatient Medications on File Prior to Visit   Medication Sig    albuterol (PROVENTIL/VENTOLIN HFA)  90 mcg/actuation inhaler INHALE 2 PUFFS BY MOUTH FOUR TIMES A DAY AS NEEDED FOR BRONCHOSPASM PREVENTION    amLODIPine (NORVASC) 10 MG tablet Take 1 tablet (10 mg total) by mouth once daily.    apixaban (ELIQUIS) 5 mg Tab Take 1 tablet (5 mg total) by mouth 2 (two) times daily.    aspirin (ECOTRIN) 81 MG EC tablet Take 81 mg by mouth once daily.    chlorhexidine (HIBICLENS) 4 % external liquid APPLY SMALL AMOUNT TOPICALLY EVERY DAY - USE TO WASH HAIR/SCALP EVERY DAY - THOROUGHLY RINSE THE AREA TO BE CLEANED WITH WATER. APPLY THE MINIMUM AMOUNT OF PRODUCT NECESSARY TO COVER THE SKIN  AND WASH GENTLY.RINSE AGAIN THOROUGHLY    citalopram (CELEXA) 40 MG tablet Take 40 mg by mouth once daily.    clobetasoL (TEMOVATE) 0.05 % external solution APPLY MODERATE AMOUNT TOPICALLY TWICE A DAY AS NEEDED TO THE SCALP    EScitalopram oxalate (LEXAPRO) 20 MG tablet Take 20 mg by mouth once daily.    fish oil-omega-3 fatty acids 300-1,000 mg capsule Take 1 g by mouth once daily.    gabapentin (NEURONTIN) 600 MG tablet Take 600 mg by mouth.    loratadine (CLARITIN) 10 mg tablet Take 10 mg by mouth once daily.    losartan (COZAAR) 100 MG tablet Take 1 tablet (100 mg total) by mouth once daily.    mirtazapine (REMERON) 30 MG tablet Take 30 mg by mouth every evening.    multivitamin capsule Take 1 capsule by mouth once daily.    omeprazole (PRILOSEC) 20 MG capsule Take 20 mg by mouth once daily.    pravastatin (PRAVACHOL) 80 MG tablet Take 1 tablet (80 mg total) by mouth once daily.    prazosin (MINIPRESS) 2 MG Cap Take 2 mg by mouth once daily.    prazosin (MINIPRESS) 5 MG capsule Take 5 mg by mouth 2 (two) times daily.    QUEtiapine (SEROQUEL) 50 MG tablet Take 50 mg by mouth nightly. One-half tablet    zolpidem (AMBIEN) 5 MG Tab Take 1 tablet (5 mg total) by mouth nightly as needed (INSOMNIA). (Patient taking differently: Take 10 mg by mouth nightly as needed (INSOMNIA).)    [DISCONTINUED] flecainide (TAMBOCOR) 50 MG Tab Take 1 tablet  (50 mg total) by mouth every 12 (twelve) hours.    [DISCONTINUED] furosemide (LASIX) 40 MG tablet Take 1 tablet (40 mg total) by mouth 2 (two) times daily.    [DISCONTINUED] metoprolol succinate (TOPROL-XL) 25 MG 24 hr tablet Take 1 tablet (25 mg total) by mouth once daily.    amitriptyline (ELAVIL) 50 MG tablet Take 50 mg by mouth every evening.    methocarbamol (ROBAXIN) 750 MG Tab Take 500 mg by mouth 3 (three) times daily.    naproxen (NAPROSYN) 500 MG tablet Take 500 mg by mouth 2 (two) times daily with meals.    traZODone (DESYREL) 50 MG tablet Take 50 mg by mouth every evening.     No current facility-administered medications on file prior to visit.       Objective:   Objective:  Wt Readings from Last 3 Encounters:   07/06/23 (!) 143 kg (315 lb 4.1 oz)   07/06/23 (!) 143 kg (315 lb 4.1 oz)   06/20/23 (!) 149.7 kg (330 lb)     Temp Readings from Last 3 Encounters:   06/20/23 97.7 °F (36.5 °C) (Temporal)   06/04/21 97.9 °F (36.6 °C)   04/06/21 98.1 °F (36.7 °C)     BP Readings from Last 3 Encounters:   07/06/23 118/72   06/20/23 (!) 114/55   06/15/23 118/82     Pulse Readings from Last 3 Encounters:   07/06/23 82   06/20/23 (!) 55   06/15/23 77       Physical Exam  Vitals reviewed.   Constitutional:       Appearance: He is well-developed.   Neck:      Vascular: No carotid bruit.   Cardiovascular:      Rate and Rhythm: Normal rate and regular rhythm.      Pulses: Intact distal pulses.      Heart sounds: Normal heart sounds. No murmur heard.  Pulmonary:      Breath sounds: Normal breath sounds.   Musculoskeletal:         General: Swelling present.   Neurological:      Mental Status: He is oriented to person, place, and time.       No results found for: CHOL  No results found for: HDL  No results found for: LDLCALC  No results found for: TRIG  No results found for: CHOLHDL    Chemistry        Component Value Date/Time     06/15/2023 1238    K 4.0 06/15/2023 1238     06/15/2023 1238    CO2 26 06/15/2023  1238    BUN 12 06/15/2023 1238    CREATININE 1.3 06/15/2023 1238     06/15/2023 1238        Component Value Date/Time    CALCIUM 9.1 06/15/2023 1238    ALKPHOS 52 (L) 04/06/2021 0729    AST 32 04/06/2021 0729    ALT 45 (H) 04/06/2021 0729    BILITOT 0.7 04/06/2021 0729    ESTGFRAFRICA >60.0 01/03/2022 1234    EGFRNONAA >60.0 01/03/2022 1234          No results found for: TSH  Lab Results   Component Value Date    INR 1.0 06/15/2023    INR 1.0 06/01/2021     Lab Results   Component Value Date    WBC 6.94 06/15/2023    HGB 14.9 06/15/2023    HCT 42.5 06/15/2023    MCV 98 06/15/2023     06/15/2023     BNP  @LABRCNTIP(BNP,BNPTRIAGEBLO)@  CrCl cannot be calculated (Patient's most recent lab result is older than the maximum 7 days allowed.).     Imaging:  ======  No results found for this or any previous visit.    No results found for this or any previous visit.    No results found for this or any previous visit.    No results found for this or any previous visit.    No results found for this or any previous visit.    No valid procedures specified.    Diagnostic Results:  ECG: Reviewed, nsr after dccv       States he had a normal heart cath in 2010 with Dr Preciado       2021   Left Main   The vessel is large.   Left Anterior Descending   First Diagonal Branch   The vessel is large.   Ramus Intermedius   The vessel is large.   Left Circumflex   The vessel is large.   First Obtuse Marginal Branch   The vessel is large.   Right Coronary Artery   The vessel is large.   Right Posterior Descending Artery   The vessel is large.   Right Posterior Atrioventricular Artery   The vessel is angiographically normal.   Intervention    No interventions have been documented.  Left Heart Findings    Left Ventricle    LVEDP (Pre):15 mmHG   Aortic Valve    No gradient on pullback   Right Heart Findings    Right Heart Pressures    RA: 8 RV: 38/ 8 PA: 36/ 17/ 22 PWP: 15 .   Cardiac output was 8. Cardiac index is 3.22 L/min/m2.    O2 Sat: PA 75%.       The ASCVD Risk score (April DK, et al., 2019) failed to calculate for the following reasons:    Cannot find a previous HDL lab    Cannot find a previous total cholesterol lab    Assessment and Plan:   Paroxysmal atrial fibrillation  -     Ambulatory referral/consult to Electrophysiology; Future; Expected date: 07/13/2023    SINGH (dyspnea on exertion)  -     metoprolol succinate (TOPROL-XL) 25 MG 24 hr tablet; Take 1 tablet (25 mg total) by mouth once daily.  Dispense: 90 tablet; Refill: 3  -     furosemide (LASIX) 80 MG tablet; Take 1 tablet (80 mg total) by mouth 2 (two) times daily.  Dispense: 180 tablet; Refill: 3  -     flecainide (TAMBOCOR) 100 MG Tab; Take 1 tablet (100 mg total) by mouth every 12 (twelve) hours.  Dispense: 60 tablet; Refill: 11    H/O agent [place] exposure    Morbid obesity    REGINA (obstructive sleep apnea)        In AFib unfortunately again.  We will increase his flecainide to 100 mg.  Continue with Toprol and increase Lasix.    Currently symptoms are under control.  However he is retaining fluid again.  Will refer to EP.    May up titrate his flecainide again on next visit if he does not pack AFib.  We will reconsider doing a DCCV once he is maxed again on flecainide.  He is compliant with the CPAP.    Reviewed all tests and above medical conditions with patient in detail and formulated treatment plan.  Risk factor modification discussed.   Cardiac low salt diet discussed.  Maintaining healthy weight and weight loss goals were discussed in clinic.  Compliant with CPAP.    Follow up in 4 weeks

## 2023-07-27 ENCOUNTER — OFFICE VISIT (OUTPATIENT)
Dept: CARDIOLOGY | Facility: CLINIC | Age: 73
End: 2023-07-27
Payer: MEDICARE

## 2023-07-27 ENCOUNTER — HOSPITAL ENCOUNTER (OUTPATIENT)
Dept: CARDIOLOGY | Facility: HOSPITAL | Age: 73
Discharge: HOME | End: 2023-07-27
Attending: INTERNAL MEDICINE
Payer: MEDICARE

## 2023-07-27 VITALS
OXYGEN SATURATION: 95 % | HEART RATE: 95 BPM | HEIGHT: 71 IN | SYSTOLIC BLOOD PRESSURE: 114 MMHG | WEIGHT: 302 LBS | DIASTOLIC BLOOD PRESSURE: 76 MMHG | BODY MASS INDEX: 42.28 KG/M2

## 2023-07-27 DIAGNOSIS — I10 HYPERTENSION, UNSPECIFIED TYPE: ICD-10-CM

## 2023-07-27 DIAGNOSIS — E66.01 MORBID OBESITY: ICD-10-CM

## 2023-07-27 DIAGNOSIS — Z77.098 H/O AGENT ORANGE EXPOSURE: ICD-10-CM

## 2023-07-27 DIAGNOSIS — G47.33 OSA (OBSTRUCTIVE SLEEP APNEA): ICD-10-CM

## 2023-07-27 DIAGNOSIS — I48.0 PAROXYSMAL ATRIAL FIBRILLATION: Primary | ICD-10-CM

## 2023-07-27 DIAGNOSIS — I48.0 PAROXYSMAL ATRIAL FIBRILLATION: ICD-10-CM

## 2023-07-27 DIAGNOSIS — R06.09 DOE (DYSPNEA ON EXERTION): ICD-10-CM

## 2023-07-27 DIAGNOSIS — I48.0 PAF (PAROXYSMAL ATRIAL FIBRILLATION): Primary | ICD-10-CM

## 2023-07-27 PROCEDURE — 99214 PR OFFICE/OUTPT VISIT, EST, LEVL IV, 30-39 MIN: ICD-10-PCS | Mod: S$PBB,,, | Performed by: INTERNAL MEDICINE

## 2023-07-27 PROCEDURE — 99999 PR PBB SHADOW E&M-EST. PATIENT-LVL IV: CPT | Mod: PBBFAC,,, | Performed by: INTERNAL MEDICINE

## 2023-07-27 PROCEDURE — 93010 ELECTROCARDIOGRAM REPORT: CPT | Mod: ,,, | Performed by: INTERNAL MEDICINE

## 2023-07-27 PROCEDURE — 93005 ELECTROCARDIOGRAM TRACING: CPT

## 2023-07-27 PROCEDURE — 99214 OFFICE O/P EST MOD 30 MIN: CPT | Mod: PBBFAC | Performed by: INTERNAL MEDICINE

## 2023-07-27 PROCEDURE — 93010 EKG 12-LEAD: ICD-10-PCS | Mod: ,,, | Performed by: INTERNAL MEDICINE

## 2023-07-27 PROCEDURE — 99214 OFFICE O/P EST MOD 30 MIN: CPT | Mod: S$PBB,,, | Performed by: INTERNAL MEDICINE

## 2023-07-27 PROCEDURE — 99999 PR PBB SHADOW E&M-EST. PATIENT-LVL IV: ICD-10-PCS | Mod: PBBFAC,,, | Performed by: INTERNAL MEDICINE

## 2023-07-27 RX ORDER — FLECAINIDE ACETATE 150 MG/1
150 TABLET ORAL EVERY 12 HOURS
Qty: 60 TABLET | Refills: 11 | Status: SHIPPED | OUTPATIENT
Start: 2023-07-27 | End: 2023-08-31

## 2023-07-27 RX ORDER — FUROSEMIDE 80 MG/1
80 TABLET ORAL 2 TIMES DAILY
Qty: 60 TABLET | Refills: 11 | OUTPATIENT
Start: 2023-07-27 | End: 2023-07-27

## 2023-07-27 RX ORDER — METOPROLOL SUCCINATE 25 MG/1
25 TABLET, EXTENDED RELEASE ORAL DAILY
Qty: 90 TABLET | Refills: 3 | Status: SHIPPED | OUTPATIENT
Start: 2023-07-27 | End: 2023-08-31

## 2023-07-27 RX ORDER — FUROSEMIDE 80 MG/1
80 TABLET ORAL 2 TIMES DAILY
Qty: 60 TABLET | Refills: 11 | OUTPATIENT
Start: 2023-07-27 | End: 2024-07-26

## 2023-07-27 RX ORDER — FLECAINIDE ACETATE 150 MG/1
150 TABLET ORAL EVERY 12 HOURS
Qty: 60 TABLET | Refills: 11 | Status: SHIPPED | OUTPATIENT
Start: 2023-07-27 | End: 2023-07-27

## 2023-07-27 RX ORDER — METOPROLOL SUCCINATE 25 MG/1
25 TABLET, EXTENDED RELEASE ORAL DAILY
Qty: 90 TABLET | Refills: 3 | Status: SHIPPED | OUTPATIENT
Start: 2023-07-27 | End: 2023-07-27

## 2023-07-27 NOTE — H&P (VIEW-ONLY)
Subjective:   Patient ID:  Lucas Garrison is a 72 y.o. male who presents for evaluation of Shortness of Breath (Notices sob when exerting )      70-YEAR-OLD MALE WITH HISTORY OF HYPERTENSION, OBESITY, OBSTRUCTIVE SLEEP APNEA COMPLIANT WITH CPAP, HYPERLIPIDEMIA.  PATIENT IS A  [facility]  PATIENT WAS HAVING A ROUTINE COLONOSCOPY FOR SCREENING EARLIER TODAY.  HE WAS CAUGHT TO HAVE AFIB NOT AN RVR.  THE OS HE WAS REFERRED FOR EVALUATION.  HE DENIES ANY PALPITATIONS, SYNCOPE, PRESYNCOPE, DIZZINESS.  HE DOES REPORT ORTHOPNEA, DYSPNEA ON EXERTION AND LOWER EXTREMITY SWELLING.  HE ADMITS TO NO SALT RESTRICTION.  HE DENIES ANY CHEST PAIN, CLAUDICATION, BLEEDING, MELENA OR HEMATEMESIS,  HE STATES THAT HE HAD A NORMAL HEART CATHETERIZATION 10 YEARS AGO    5/10/2021   Feels better but still complains of SINGH , felt better after the mario/dccv  But lately singh again, with mild LE swelling, + mild orthopnea  ekg sinus bradycardia, non specific st t waves   Increase lasix, less salt     6/10/2021   Had abnormal stress test after last visit , cath was normal cors   Mildly elevated left sided pressure   Still feeling dyspnea   euvolemic on exam   No chest pain, in sinus rhythm   No other complaints except for insomnia   Cath site right radial and right antecubital no issues     Shortness of Breath  Associated symptoms include leg swelling. Pertinent negatives include no chest pain or syncope.   1.3.2021  DOING WELL.  Denies chest pain.  Still reports dyspnea on exertion however better compared to in the past.  NYHA 1.   Still not completely adherent with low salt  Known to have mildly elevated LVEDP on his catheterization last year.  With normal coronaries.  No palpitations, no bleeding while on Eliquis.  Not on aspirin.  Blood pressure not at goal states he was rushing to the clinic today.  Will switch his Toprol to bisoprolol.  He states that [facility] and he had [facility]  [facility] as per the patient himself however no documents  He is taking Lasix 40 mg twice a day.  Asking for  a new prescription before he runs out of the medication      6.30.2022  Doing well , had normal cath a year ago  No CP   SINGH resolved after last med adjustments   Needs refill   SEE ROS     12.13.2022  Comes in for a 6 months follow up   Still has his chronic SINGH, still [facility]  He has dependent lower ext edema , but also has orthopnea , singh and lvedp 15 mmhg on cath   Uses his lasix twice a day , states good diuresis   No chest pain   No palpitations  He [facility] and not [facility]s I reviewed the meds   No bleeding   Uses his CPAP   Normal cath 2021    6.15.2023  In for six-month follow-up.    Having dyspnea on exertion NYHA 2.  Orthopnea PND.  Worsened in the last 2-3 weeks.  On EKG his AFib rate control.    He states he is taking the Lasix only once a day  States he avoiding any salt.    Had normal catheterization 2021.    His last NATIVIDAD cardioversion 2021 7.6.2023.  Had recent NATIVIDAD cardioversion 2 weeks ago, while on Toprol.  Started on flecainide post cardioversion.    He is currently back in AFib.    Uses his CPAP nightly.    He states that he feels better since his cardioversion and has not gotten worse lately.    However he is noted to have lower extremity swelling.    He states that he already takes Lasix 80 mg in the morning and 40 mg in the evening.  States he is able to mow his lawn.  Feels better than when he was seen in clinic before his cardioversion.  Denies palpitations.      7.27.2023  Comes in for 3 weeks follow-up.  I increased his flecainide to 100 mg on his last visit in the hope to flu him back into sinus rhythm.    He still in AFib today rate controlled.    Has not seen EP yet.    He states that he started feeling again his dyspnea on exertion.  He states that walking through the parking was struggle.  He states that he  was feeling better after his cardioversion.    Again he has lower extremity swelling.        Past Medical History:   Diagnosis Date    Cancer     DDD (degenerative disc disease)     H/O prostate cancer     HLD (hyperlipidemia)     Hypertension     Lumbago     REGINA (obstructive sleep apnea)     Peripheral neuropathy     PTSD (post-traumatic stress disorder)        Past Surgical History:   Procedure Laterality Date    CATHETERIZATION OF BOTH LEFT AND RIGHT HEART N/A 2021    Procedure: CATHETERIZATION, HEART, BOTH LEFT AND RIGHT;  Surgeon: Baldemar Davalos MD;  Location: [facility] CATH LAB;  Service: Cardiology;  Laterality: N/A;    COLONOSCOPY N/A 2016    Procedure: COLONOSCOPY;  Surgeon: Zeus Guerrier MD;  Location: [facility] ENDO;  Service: Endoscopy;  Laterality: N/A;    COLONOSCOPY N/A 2021    Procedure: COLONOSCOPY;  Surgeon: Joselyn Castanon MD;  Location: [facility] ENDO;  Service: Endoscopy;  Laterality: N/A;    CORONARY ANGIOGRAPHY N/A 2021    Procedure: ANGIOGRAM, CORONARY ARTERY;  Surgeon: Baldemar Davalos MD;  Location: [facility] CATH LAB;  Service: Cardiology;  Laterality: N/A;    prostate radiation seeds      RIGHT HEART CATHETERIZATION Right 2021    Procedure: INSERTION, CATHETER, RIGHT HEART;  Surgeon: Baldemar Davalos MD;  Location: [facility] CATH LAB;  Service: Cardiology;  Laterality: Right;    TONSILLECTOMY      TRANSESOPHAGEAL ECHOCARDIOGRAM WITH POSSIBLE CARDIOVERSION (NATIVIDAD W/ POSS CARDIOVERSION) N/A 2023    Procedure: Transesophageal echo (NATIVIDAD) intra-procedure log documentation;  Surgeon: Baldemar Davalos MD;  Location: [facility] CATH LAB;  Service: Cardiology;  Laterality: N/A;    TRANSURETHRAL RESECTION OF PROSTATE         Social History     Tobacco Use    Smoking status: Former     Packs/day: 1.50     Years: 10.00     Pack years: 15.00     Types: Cigarettes     Quit date: 10/30/1982     Years since quittin.7   Substance Use Topics    Alcohol use: No    Drug use: No        Family History   Problem Relation Age of Onset    COPD Mother     Depression Brother     Colon cancer Neg Hx        Review of Systems   Cardiovascular:  Positive for dyspnea on exertion and leg swelling. Negative for chest pain, palpitations and syncope.   Respiratory:  Negative for shortness of breath.    Genitourinary: Negative.    Neurological: Negative.      Current Outpatient Medications on File Prior to Visit   Medication Sig    albuterol (PROVENTIL/VENTOLIN HFA) 90 mcg/actuation inhaler INHALE 2 PUFFS BY MOUTH FOUR TIMES A DAY AS NEEDED FOR BRONCHOSPASM PREVENTION    amitriptyline (ELAVIL) 50 MG tablet Take 50 mg by mouth every evening.    amLODIPine (NORVASC) 10 MG tablet Take 1 tablet (10 mg total) by mouth once daily.    apixaban (ELIQUIS) 5 mg Tab Take 1 tablet (5 mg total) by mouth 2 (two) times daily.    aspirin (ECOTRIN) 81 MG EC tablet Take 81 mg by mouth once daily.    chlorhexidine (HIBICLENS) 4 % external liquid APPLY SMALL AMOUNT TOPICALLY EVERY DAY - USE TO WASH HAIR/SCALP EVERY DAY - THOROUGHLY RINSE THE AREA TO BE CLEANED WITH WATER. APPLY THE MINIMUM AMOUNT OF PRODUCT NECESSARY TO COVER THE SKIN  AND WASH GENTLY.RINSE AGAIN THOROUGHLY    citalopram (CELEXA) 40 MG tablet Take 40 mg by mouth once daily.    clobetasoL (TEMOVATE) 0.05 % external solution APPLY MODERATE AMOUNT TOPICALLY TWICE A DAY AS NEEDED TO THE SCALP    EScitalopram oxalate (LEXAPRO) 20 MG tablet Take 20 mg by mouth once daily.    fish oil-omega-3 fatty acids 300-1,000 mg capsule Take 1 g by mouth once daily.    gabapentin (NEURONTIN) 600 MG tablet Take 600 mg by mouth.    loratadine (CLARITIN) 10 mg tablet Take 10 mg by mouth once daily.    losartan (COZAAR) 100 MG tablet Take 1 tablet (100 mg total) by mouth once daily.    methocarbamol (ROBAXIN) 750 MG Tab Take 500 mg by mouth 3 (three) times daily.    mirtazapine (REMERON) 30 MG tablet Take 30 mg by mouth every evening.    multivitamin capsule Take 1 capsule by mouth  once daily.    naproxen (NAPROSYN) 500 MG tablet Take 500 mg by mouth 2 (two) times daily with meals.    omeprazole (PRILOSEC) 20 MG capsule Take 20 mg by mouth once daily.    pravastatin (PRAVACHOL) 80 MG tablet Take 1 tablet (80 mg total) by mouth once daily.    prazosin (MINIPRESS) 2 MG Cap Take 2 mg by mouth once daily.    prazosin (MINIPRESS) 5 MG capsule Take 5 mg by mouth 2 (two) times daily.    QUEtiapine (SEROQUEL) 50 MG tablet Take 50 mg by mouth nightly. One-half tablet    traZODone (DESYREL) 50 MG tablet Take 50 mg by mouth every evening.    [DISCONTINUED] flecainide (TAMBOCOR) 100 MG Tab Take 1 tablet (100 mg total) by mouth every 12 (twelve) hours.    [DISCONTINUED] furosemide (LASIX) 80 MG tablet Take 1 tablet (80 mg total) by mouth 2 (two) times daily.    [DISCONTINUED] metoprolol succinate (TOPROL-XL) 25 MG 24 hr tablet Take 1 tablet (25 mg total) by mouth once daily.    zolpidem (AMBIEN) 5 MG Tab Take 1 tablet (5 mg total) by mouth nightly as needed (INSOMNIA). (Patient taking differently: Take 10 mg by mouth nightly as needed (INSOMNIA).)     No current facility-administered medications on file prior to visit.       Objective:   Objective:  Wt Readings from Last 3 Encounters:   07/27/23 (!) 137 kg (302 lb 0.5 oz)   07/06/23 (!) 143 kg (315 lb 4.1 oz)   07/06/23 (!) 143 kg (315 lb 4.1 oz)     Temp Readings from Last 3 Encounters:   06/20/23 97.7 °F (36.5 °C) (Temporal)   06/04/21 97.9 °F (36.6 °C)   04/06/21 98.1 °F (36.7 °C)     BP Readings from Last 3 Encounters:   07/27/23 114/76   07/06/23 118/72   06/20/23 (!) 114/55     Pulse Readings from Last 3 Encounters:   07/27/23 95   07/06/23 82   06/20/23 (!) 55       Physical Exam  Vitals reviewed.   Constitutional:       Appearance: He is well-developed.   Neck:      Vascular: No carotid bruit.   Cardiovascular:      Rate and Rhythm: Rhythm irregular.      Pulses: Intact distal pulses.      Heart sounds: Normal heart sounds. No murmur  heard.  Pulmonary:      Breath sounds: Normal breath sounds.   Musculoskeletal:         General: Swelling present.   Neurological:      Mental Status: He is oriented to person, place, and time.       No results found for: CHOL  No results found for: HDL  No results found for: LDLCALC  No results found for: TRIG  No results found for: CHOLHDL    Chemistry        Component Value Date/Time     06/15/2023 1238    K 4.0 06/15/2023 1238     06/15/2023 1238    CO2 26 06/15/2023 1238    BUN 12 06/15/2023 1238    CREATININE 1.3 06/15/2023 1238     06/15/2023 1238        Component Value Date/Time    CALCIUM 9.1 06/15/2023 1238    ALKPHOS 52 (L) 04/06/2021 0729    AST 32 04/06/2021 0729    ALT 45 (H) 04/06/2021 0729    BILITOT 0.7 04/06/2021 0729    ESTGFRAFRICA >60.0 01/03/2022 1234    EGFRNONAA >60.0 01/03/2022 1234          No results found for: TSH  Lab Results   Component Value Date    INR 1.0 06/15/2023    INR 1.0 06/01/2021     Lab Results   Component Value Date    WBC 6.94 06/15/2023    HGB 14.9 06/15/2023    HCT 42.5 06/15/2023    MCV 98 06/15/2023     06/15/2023     BNP  @LABRCNTIP(BNP,BNPTRIAGEBLO)@  CrCl cannot be calculated (Patient's most recent lab result is older than the maximum 7 days allowed.).     Imaging:  ======  No results found for this or any previous visit.    No results found for this or any previous visit.    No results found for this or any previous visit.    No results found for this or any previous visit.    No results found for this or any previous visit.    No valid procedures specified.    Diagnostic Results:  ECG: Reviewed, nsr after dccv       States he had a normal heart cath in 2010 with Dr Preciado       2021   Left Main   The vessel is large.   Left Anterior Descending   First Diagonal Branch   The vessel is large.   Ramus Intermedius   The vessel is large.   Left Circumflex   The vessel is large.   First Obtuse Marginal Branch   The vessel is large.   Right  Coronary Artery   The vessel is large.   Right Posterior Descending Artery   The vessel is large.   Right Posterior Atrioventricular Artery   The vessel is angiographically normal.   Intervention    No interventions have been documented.  Left Heart Findings    Left Ventricle    LVEDP (Pre):15 mmHG   Aortic Valve    No gradient on pullback   Right Heart Findings    Right Heart Pressures    RA: 8 RV: 38/ 8 PA: 36/ 17/ 22 PWP: 15 .   Cardiac output was 8. Cardiac index is 3.22 L/min/m2.   O2 Sat: PA 75%.       The ASCVD Risk score (April COYLE, et al., 2019) failed to calculate for the following reasons:    Cannot find a previous HDL lab    Cannot find a previous total cholesterol lab    Assessment and Plan:   PAF (paroxysmal atrial fibrillation)  -     IN OFFICE EKG 12-LEAD (to [facility])  -     Basic metabolic panel; Future; Expected date: 07/27/2023  -     CBC Auto Differential; Future; Expected date: 07/27/2023  -     Protime-INR; Future; Expected date: 07/27/2023    Hypertension, unspecified type    H/O agent Orange exposure    REGINA (obstructive sleep apnea)    Morbid obesity    SINGH (dyspnea on exertion)  -     Discontinue: metoprolol succinate (TOPROL-XL) 25 MG 24 hr tablet; Take 1 tablet (25 mg total) by mouth once daily.  Dispense: 90 tablet; Refill: 3  -     Discontinue: flecainide (TAMBOCOR) 150 MG Tab; Take 1 tablet (150 mg total) by mouth every 12 (twelve) hours.  Dispense: 60 tablet; Refill: 11  -     Discontinue: furosemide (LASIX) 80 MG tablet; Take 1 tablet (80 mg total) by mouth 2 (two) times daily.  Dispense: 60 tablet; Refill: 11  -     flecainide (TAMBOCOR) 150 MG Tab; Take 1 tablet (150 mg total) by mouth every 12 (twelve) hours.  Dispense: 60 tablet; Refill: 11  -     furosemide (LASIX) 80 MG tablet; Take 1 tablet (80 mg total) by mouth 2 (two) times daily.  Dispense: 60 tablet; Refill: 11  -     metoprolol succinate (TOPROL-XL) 25 MG 24 hr tablet; Take 1 tablet (25 mg total) by mouth once daily.   Dispense: 90 tablet; Refill: 3    Will increase his flecainide to 150 mg for an entire week and do a cardioversion without NATIVIDAD since he just had a NATIVIDAD and he never interrupted his anticoagulation.  This was explained to the patient.    EP referral pending.    With Eliquis    He is compliant with the CPAP.    Reviewed all tests and above medical conditions with patient in detail and formulated treatment plan.  Risk factor modification discussed.   Cardiac low salt diet discussed.  Maintaining healthy weight and weight loss goals were discussed in clinic.      Follow up in 4 weeks

## 2023-07-27 NOTE — PROGRESS NOTES
Subjective:   Patient ID:  Lucas Garrison is a 72 y.o. male who presents for evaluation of Shortness of Breath (Notices sob when exerting )      70-YEAR-OLD MALE WITH HISTORY OF HYPERTENSION, OBESITY, OBSTRUCTIVE SLEEP APNEA COMPLIANT WITH CPAP, HYPERLIPIDEMIA.  PATIENT IS A  [facility]  PATIENT WAS HAVING A ROUTINE COLONOSCOPY FOR SCREENING EARLIER TODAY.  HE WAS CAUGHT TO HAVE AFIB NOT AN RVR.  THE OS HE WAS REFERRED FOR EVALUATION.  HE DENIES ANY PALPITATIONS, SYNCOPE, PRESYNCOPE, DIZZINESS.  HE DOES REPORT ORTHOPNEA, DYSPNEA ON EXERTION AND LOWER EXTREMITY SWELLING.  HE ADMITS TO NO SALT RESTRICTION.  HE DENIES ANY CHEST PAIN, CLAUDICATION, BLEEDING, MELENA OR HEMATEMESIS,  HE STATES THAT HE HAD A NORMAL HEART CATHETERIZATION 10 YEARS AGO    5/10/2021   Feels better but still complains of SINGH , felt better after the mario/dccv  But lately singh again, with mild LE swelling, + mild orthopnea  ekg sinus bradycardia, non specific st t waves   Increase lasix, less salt     6/10/2021   Had abnormal stress test after last visit , cath was normal cors   Mildly elevated left sided pressure   Still feeling dyspnea   euvolemic on exam   No chest pain, in sinus rhythm   No other complaints except for insomnia   Cath site right radial and right antecubital no issues     Shortness of Breath  Associated symptoms include leg swelling. Pertinent negatives include no chest pain or syncope.   1.3.2021  DOING WELL.  Denies chest pain.  Still reports dyspnea on exertion however better compared to in the past.  NYHA 1.   Still not completely adherent with low salt  Known to have mildly elevated LVEDP on his catheterization last year.  With normal coronaries.  No palpitations, no bleeding while on Eliquis.  Not on aspirin.  Blood pressure not at goal states he was rushing to the clinic today.  Will switch his Toprol to bisoprolol.  He states [facility] and [facility]  [facility] as per the patient himself however no documents  He is taking Lasix 40 mg twice a day.  Asking for  a new prescription before he runs out of the medication      6.30.2022  Doing well , had normal cath a year ago  No CP   SINGH resolved after last med adjustments   Needs refill   SEE ROS     12.13.2022  Comes in for a 6 months follow up   Still has his chronic SINGH, still [facility]  He has dependent lower ext edema , but also has orthopnea , singh and lvedp 15 mmhg on cath   Uses his lasix twice a day , states good diuresis   No chest pain   No palpitations  He states [facility] and not [facility] his meds I reviewed the meds   No bleeding   Uses his CPAP   Normal cath 2021    6.15.2023  In for six-month follow-up.    Having dyspnea on exertion NYHA 2.  Orthopnea PND.  Worsened in the last 2-3 weeks.  On EKG his AFib rate control.    He states he is taking the Lasix only once a day  States he avoiding any salt.    Had normal catheterization 2021.    His last NATIVIDAD cardioversion 2021 7.6.2023.  Had recent NATIVIDAD cardioversion 2 weeks ago, while on Toprol.  Started on flecainide post cardioversion.    He is currently back in AFib.    Uses his CPAP nightly.    He states that he feels better since his cardioversion and has not gotten worse lately.    However he is noted to have lower extremity swelling.    He states that he already takes Lasix 80 mg in the morning and 40 mg in the evening.  States he is able to mow his lawn.  Feels better than when he was seen in clinic before his cardioversion.  Denies palpitations.      7.27.2023  Comes in for 3 weeks follow-up.  I increased his flecainide to 100 mg on his last visit in the hope to flu him back into sinus rhythm.    He still in AFib today rate controlled.    Has not seen EP yet.    He states that he started feeling again his dyspnea on exertion.  He states that walking through the parking was struggle.  He states that he  was feeling better after his cardioversion.    Again he has lower extremity swelling.        Past Medical History:   Diagnosis Date    Cancer     DDD (degenerative disc disease)     H/O prostate cancer     HLD (hyperlipidemia)     Hypertension     Lumbago     REGINA (obstructive sleep apnea)     Peripheral neuropathy     PTSD (post-traumatic stress disorder)        Past Surgical History:   Procedure Laterality Date    CATHETERIZATION OF BOTH LEFT AND RIGHT HEART N/A 2021    Procedure: CATHETERIZATION, HEART, BOTH LEFT AND RIGHT;  Surgeon: Baldemar Davalos MD;  Location: [facility] CATH LAB;  Service: Cardiology;  Laterality: N/A;    COLONOSCOPY N/A 2016    Procedure: COLONOSCOPY;  Surgeon: Zeus Guerrier MD;  Location: [facility] ENDO;  Service: Endoscopy;  Laterality: N/A;    COLONOSCOPY N/A 2021    Procedure: COLONOSCOPY;  Surgeon: Joselyn Castanon MD;  Location: [facility] ENDO;  Service: Endoscopy;  Laterality: N/A;    CORONARY ANGIOGRAPHY N/A 2021    Procedure: ANGIOGRAM, CORONARY ARTERY;  Surgeon: Baldemar Davalos MD;  Location: [facility] CATH LAB;  Service: Cardiology;  Laterality: N/A;    prostate radiation seeds      RIGHT HEART CATHETERIZATION Right 2021    Procedure: INSERTION, CATHETER, RIGHT HEART;  Surgeon: Baldemar Davalos MD;  Location: [facility] CATH LAB;  Service: Cardiology;  Laterality: Right;    TONSILLECTOMY      TRANSESOPHAGEAL ECHOCARDIOGRAM WITH POSSIBLE CARDIOVERSION (NATIVIDAD W/ POSS CARDIOVERSION) N/A 2023    Procedure: Transesophageal echo (NATIVIDAD) intra-procedure log documentation;  Surgeon: Baldemar Davalos MD;  Location: [facility] CATH LAB;  Service: Cardiology;  Laterality: N/A;    TRANSURETHRAL RESECTION OF PROSTATE         Social History     Tobacco Use    Smoking status: Former     Packs/day: 1.50     Years: 10.00     Pack years: 15.00     Types: Cigarettes     Quit date: 10/30/1982     Years since quittin.7   Substance Use Topics    Alcohol use: No    Drug use: No        Family History   Problem Relation Age of Onset    COPD Mother     Depression Brother     Colon cancer Neg Hx        Review of Systems   Cardiovascular:  Positive for dyspnea on exertion and leg swelling. Negative for chest pain, palpitations and syncope.   Respiratory:  Negative for shortness of breath.    Genitourinary: Negative.    Neurological: Negative.      Current Outpatient Medications on File Prior to Visit   Medication Sig    albuterol (PROVENTIL/VENTOLIN HFA) 90 mcg/actuation inhaler INHALE 2 PUFFS BY MOUTH FOUR TIMES A DAY AS NEEDED FOR BRONCHOSPASM PREVENTION    amitriptyline (ELAVIL) 50 MG tablet Take 50 mg by mouth every evening.    amLODIPine (NORVASC) 10 MG tablet Take 1 tablet (10 mg total) by mouth once daily.    apixaban (ELIQUIS) 5 mg Tab Take 1 tablet (5 mg total) by mouth 2 (two) times daily.    aspirin (ECOTRIN) 81 MG EC tablet Take 81 mg by mouth once daily.    chlorhexidine (HIBICLENS) 4 % external liquid APPLY SMALL AMOUNT TOPICALLY EVERY DAY - USE TO WASH HAIR/SCALP EVERY DAY - THOROUGHLY RINSE THE AREA TO BE CLEANED WITH WATER. APPLY THE MINIMUM AMOUNT OF PRODUCT NECESSARY TO COVER THE SKIN  AND WASH GENTLY.RINSE AGAIN THOROUGHLY    citalopram (CELEXA) 40 MG tablet Take 40 mg by mouth once daily.    clobetasoL (TEMOVATE) 0.05 % external solution APPLY MODERATE AMOUNT TOPICALLY TWICE A DAY AS NEEDED TO THE SCALP    EScitalopram oxalate (LEXAPRO) 20 MG tablet Take 20 mg by mouth once daily.    fish oil-omega-3 fatty acids 300-1,000 mg capsule Take 1 g by mouth once daily.    gabapentin (NEURONTIN) 600 MG tablet Take 600 mg by mouth.    loratadine (CLARITIN) 10 mg tablet Take 10 mg by mouth once daily.    losartan (COZAAR) 100 MG tablet Take 1 tablet (100 mg total) by mouth once daily.    methocarbamol (ROBAXIN) 750 MG Tab Take 500 mg by mouth 3 (three) times daily.    mirtazapine (REMERON) 30 MG tablet Take 30 mg by mouth every evening.    multivitamin capsule Take 1 capsule by mouth  once daily.    naproxen (NAPROSYN) 500 MG tablet Take 500 mg by mouth 2 (two) times daily with meals.    omeprazole (PRILOSEC) 20 MG capsule Take 20 mg by mouth once daily.    pravastatin (PRAVACHOL) 80 MG tablet Take 1 tablet (80 mg total) by mouth once daily.    prazosin (MINIPRESS) 2 MG Cap Take 2 mg by mouth once daily.    prazosin (MINIPRESS) 5 MG capsule Take 5 mg by mouth 2 (two) times daily.    QUEtiapine (SEROQUEL) 50 MG tablet Take 50 mg by mouth nightly. One-half tablet    traZODone (DESYREL) 50 MG tablet Take 50 mg by mouth every evening.    [DISCONTINUED] flecainide (TAMBOCOR) 100 MG Tab Take 1 tablet (100 mg total) by mouth every 12 (twelve) hours.    [DISCONTINUED] furosemide (LASIX) 80 MG tablet Take 1 tablet (80 mg total) by mouth 2 (two) times daily.    [DISCONTINUED] metoprolol succinate (TOPROL-XL) 25 MG 24 hr tablet Take 1 tablet (25 mg total) by mouth once daily.    zolpidem (AMBIEN) 5 MG Tab Take 1 tablet (5 mg total) by mouth nightly as needed (INSOMNIA). (Patient taking differently: Take 10 mg by mouth nightly as needed (INSOMNIA).)     No current facility-administered medications on file prior to visit.       Objective:   Objective:  Wt Readings from Last 3 Encounters:   07/27/23 (!) 137 kg (302 lb 0.5 oz)   07/06/23 (!) 143 kg (315 lb 4.1 oz)   07/06/23 (!) 143 kg (315 lb 4.1 oz)     Temp Readings from Last 3 Encounters:   06/20/23 97.7 °F (36.5 °C) (Temporal)   06/04/21 97.9 °F (36.6 °C)   04/06/21 98.1 °F (36.7 °C)     BP Readings from Last 3 Encounters:   07/27/23 114/76   07/06/23 118/72   06/20/23 (!) 114/55     Pulse Readings from Last 3 Encounters:   07/27/23 95   07/06/23 82   06/20/23 (!) 55       Physical Exam  Vitals reviewed.   Constitutional:       Appearance: He is well-developed.   Neck:      Vascular: No carotid bruit.   Cardiovascular:      Rate and Rhythm: Rhythm irregular.      Pulses: Intact distal pulses.      Heart sounds: Normal heart sounds. No murmur  heard.  Pulmonary:      Breath sounds: Normal breath sounds.   Musculoskeletal:         General: Swelling present.   Neurological:      Mental Status: He is oriented to person, place, and time.       No results found for: CHOL  No results found for: HDL  No results found for: LDLCALC  No results found for: TRIG  No results found for: CHOLHDL    Chemistry        Component Value Date/Time     06/15/2023 1238    K 4.0 06/15/2023 1238     06/15/2023 1238    CO2 26 06/15/2023 1238    BUN 12 06/15/2023 1238    CREATININE 1.3 06/15/2023 1238     06/15/2023 1238        Component Value Date/Time    CALCIUM 9.1 06/15/2023 1238    ALKPHOS 52 (L) 04/06/2021 0729    AST 32 04/06/2021 0729    ALT 45 (H) 04/06/2021 0729    BILITOT 0.7 04/06/2021 0729    ESTGFRAFRICA >60.0 01/03/2022 1234    EGFRNONAA >60.0 01/03/2022 1234          No results found for: TSH  Lab Results   Component Value Date    INR 1.0 06/15/2023    INR 1.0 06/01/2021     Lab Results   Component Value Date    WBC 6.94 06/15/2023    HGB 14.9 06/15/2023    HCT 42.5 06/15/2023    MCV 98 06/15/2023     06/15/2023     BNP  @LABRCNTIP(BNP,BNPTRIAGEBLO)@  CrCl cannot be calculated (Patient's most recent lab result is older than the maximum 7 days allowed.).     Imaging:  ======  No results found for this or any previous visit.    No results found for this or any previous visit.    No results found for this or any previous visit.    No results found for this or any previous visit.    No results found for this or any previous visit.    No valid procedures specified.    Diagnostic Results:  ECG: Reviewed, nsr after dccv       States he had a normal heart cath in 2010 with Dr Preciado       2021   Left Main   The vessel is large.   Left Anterior Descending   First Diagonal Branch   The vessel is large.   Ramus Intermedius   The vessel is large.   Left Circumflex   The vessel is large.   First Obtuse Marginal Branch   The vessel is large.   Right  Coronary Artery   The vessel is large.   Right Posterior Descending Artery   The vessel is large.   Right Posterior Atrioventricular Artery   The vessel is angiographically normal.   Intervention    No interventions have been documented.  Left Heart Findings    Left Ventricle    LVEDP (Pre):15 mmHG   Aortic Valve    No gradient on pullback   Right Heart Findings    Right Heart Pressures    RA: 8 RV: 38/ 8 PA: 36/ 17/ 22 PWP: 15 .   Cardiac output was 8. Cardiac index is 3.22 L/min/m2.   O2 Sat: PA 75%.       The ASCVD Risk score (April COYLE, et al., 2019) failed to calculate for the following reasons:    Cannot find a previous HDL lab    Cannot find a previous total cholesterol lab    Assessment and Plan:   PAF (paroxysmal atrial fibrillation)  -     IN OFFICE EKG 12-LEAD (to Muse)  -     Basic metabolic panel; Future; Expected date: 07/27/2023  -     CBC Auto Differential; Future; Expected date: 07/27/2023  -     Protime-INR; Future; Expected date: 07/27/2023    Hypertension, unspecified type    H/O agent Orange exposure    REGINA (obstructive sleep apnea)    Morbid obesity    SINGH (dyspnea on exertion)  -     Discontinue: metoprolol succinate (TOPROL-XL) 25 MG 24 hr tablet; Take 1 tablet (25 mg total) by mouth once daily.  Dispense: 90 tablet; Refill: 3  -     Discontinue: flecainide (TAMBOCOR) 150 MG Tab; Take 1 tablet (150 mg total) by mouth every 12 (twelve) hours.  Dispense: 60 tablet; Refill: 11  -     Discontinue: furosemide (LASIX) 80 MG tablet; Take 1 tablet (80 mg total) by mouth 2 (two) times daily.  Dispense: 60 tablet; Refill: 11  -     flecainide (TAMBOCOR) 150 MG Tab; Take 1 tablet (150 mg total) by mouth every 12 (twelve) hours.  Dispense: 60 tablet; Refill: 11  -     furosemide (LASIX) 80 MG tablet; Take 1 tablet (80 mg total) by mouth 2 (two) times daily.  Dispense: 60 tablet; Refill: 11  -     metoprolol succinate (TOPROL-XL) 25 MG 24 hr tablet; Take 1 tablet (25 mg total) by mouth once daily.   Dispense: 90 tablet; Refill: 3    Will increase his flecainide to 150 mg for an entire week and do a cardioversion without NATIVIDAD since he just had a NATIVIDAD and he never interrupted his anticoagulation.  This was explained to the patient.    EP referral pending.    With Eliquis    He is compliant with the CPAP.    Reviewed all tests and above medical conditions with patient in detail and formulated treatment plan.  Risk factor modification discussed.   Cardiac low salt diet discussed.  Maintaining healthy weight and weight loss goals were discussed in clinic.      Follow up in 4 weeks

## 2023-08-02 ENCOUNTER — TELEPHONE (OUTPATIENT)
Dept: CARDIOLOGY | Facility: CLINIC | Age: 73
End: 2023-08-02
Payer: OTHER GOVERNMENT

## 2023-08-02 NOTE — TELEPHONE ENCOUNTER
LVM for pt's wife in regards to his arrival time tomorrow for procedure.           ----- Message from Jennifer Rivera sent at 8/2/2023 11:20 AM CDT -----  Contact: Pam/Wife  Pt wife is calling in regards to getting time of arrival for procedure on 08/03. Please call back at [phone] or [phone]        Thanks  SW

## 2023-08-03 ENCOUNTER — HOSPITAL ENCOUNTER (OUTPATIENT)
Facility: HOSPITAL | Age: 73
Discharge: HOME | End: 2023-08-03
Attending: INTERNAL MEDICINE | Admitting: INTERNAL MEDICINE
Payer: OTHER GOVERNMENT

## 2023-08-03 ENCOUNTER — ANESTHESIA EVENT (OUTPATIENT)
Dept: CARDIOLOGY | Facility: HOSPITAL | Age: 73
End: 2023-08-03
Payer: OTHER GOVERNMENT

## 2023-08-03 ENCOUNTER — ANESTHESIA (OUTPATIENT)
Dept: CARDIOLOGY | Facility: HOSPITAL | Age: 73
End: 2023-08-03
Payer: OTHER GOVERNMENT

## 2023-08-03 DIAGNOSIS — I48.91 ATRIAL FIBRILLATION, UNSPECIFIED TYPE: ICD-10-CM

## 2023-08-03 DIAGNOSIS — I48.91 AFIB: ICD-10-CM

## 2023-08-03 DIAGNOSIS — I48.91 A-FIB: ICD-10-CM

## 2023-08-03 PROCEDURE — 63600175 PHARM REV CODE 636 W HCPCS: Performed by: NURSE ANESTHETIST, CERTIFIED REGISTERED

## 2023-08-03 PROCEDURE — 93010 EKG 12-LEAD: ICD-10-PCS | Mod: 59,,, | Performed by: INTERNAL MEDICINE

## 2023-08-03 PROCEDURE — 93010 EKG 12-LEAD: ICD-10-PCS | Mod: ,,, | Performed by: INTERNAL MEDICINE

## 2023-08-03 PROCEDURE — 93005 ELECTROCARDIOGRAM TRACING: CPT

## 2023-08-03 PROCEDURE — 00410 ANES INTEG SYS CONV ARRHYT: CPT | Performed by: INTERNAL MEDICINE

## 2023-08-03 PROCEDURE — 37000008 HC ANESTHESIA 1ST 15 MINUTES: Performed by: INTERNAL MEDICINE

## 2023-08-03 PROCEDURE — 37000009 HC ANESTHESIA EA ADD 15 MINS: Performed by: INTERNAL MEDICINE

## 2023-08-03 PROCEDURE — 92960 PR CARDIOVERSION, ELECTIVE;EXTERN: ICD-10-PCS | Mod: ,,, | Performed by: INTERNAL MEDICINE

## 2023-08-03 PROCEDURE — 92960 CARDIOVERSION ELECTRIC EXT: CPT | Mod: 59 | Performed by: INTERNAL MEDICINE

## 2023-08-03 PROCEDURE — 93010 ELECTROCARDIOGRAM REPORT: CPT | Mod: 59,,, | Performed by: INTERNAL MEDICINE

## 2023-08-03 PROCEDURE — 93010 ELECTROCARDIOGRAM REPORT: CPT | Mod: ,,, | Performed by: INTERNAL MEDICINE

## 2023-08-03 PROCEDURE — 92960 CARDIOVERSION ELECTRIC EXT: CPT | Mod: ,,, | Performed by: INTERNAL MEDICINE

## 2023-08-03 RX ORDER — SODIUM CHLORIDE, SODIUM LACTATE, POTASSIUM CHLORIDE, CALCIUM CHLORIDE 600; 310; 30; 20 MG/100ML; MG/100ML; MG/100ML; MG/100ML
INJECTION, SOLUTION INTRAVENOUS CONTINUOUS PRN
Status: DISCONTINUED | OUTPATIENT
Start: 2023-08-03 | End: 2023-08-03

## 2023-08-03 RX ORDER — PROPOFOL 10 MG/ML
VIAL (ML) INTRAVENOUS
Status: DISCONTINUED | OUTPATIENT
Start: 2023-08-03 | End: 2023-08-03

## 2023-08-03 RX ADMIN — PROPOFOL 80 MG: 10 INJECTION, EMULSION INTRAVENOUS at 09:08

## 2023-08-03 RX ADMIN — SODIUM CHLORIDE, SODIUM LACTATE, POTASSIUM CHLORIDE, AND CALCIUM CHLORIDE: 600; 310; 30; 20 INJECTION, SOLUTION INTRAVENOUS at 09:08

## 2023-08-03 NOTE — PLAN OF CARE
Awake, alert and oriented.  Denies dizziness or nausea.  Able to ambulate w/o difficulty.  Discharge inst. Reviewed and copy given.  IV removed.  Discharged home.  To exit via w/c

## 2023-08-03 NOTE — ANESTHESIA PREPROCEDURE EVALUATION
08/03/2023  Lucas Garrison is a 72 y.o., male.      Pre-op Assessment    I have reviewed the Patient Summary Reports.     I have reviewed the Nursing Notes. I have reviewed the NPO Status.   I have reviewed the Medications.     Review of Systems  Cardiovascular:   Hypertension Dysrhythmias atrial fibrillation    Pulmonary:   Sleep Apnea, CPAP    Musculoskeletal:   Arthritis     Neurological:   Neuromuscular Disease,    Psych:   Psychiatric History          Physical Exam  General: Well nourished, Cooperative, Alert and Oriented    Airway:  Mallampati: II   Mouth Opening: Normal  TM Distance: Normal  Tongue: Normal  Neck ROM: Normal ROM    Dental:  Intact    Heart:  Rhythm: Irregularly Irregular        Anesthesia Plan  Type of Anesthesia, risks & benefits discussed:    Anesthesia Type: MAC  Intra-op Monitoring Plan: Standard ASA Monitors  Post Op Pain Control Plan: multimodal analgesia  Induction:  IV  Informed Consent: Informed consent signed with the Patient and all parties understand the risks and agree with anesthesia plan.  All questions answered.   ASA Score: 3  Day of Surgery Review of History & Physical: H&P Update referred to the surgeon/provider.    Ready For Surgery From Anesthesia Perspective.     .

## 2023-08-03 NOTE — DISCHARGE INSTRUCTIONS
ACTIVITY LEVEL  You may feel sleepy for several hours.  Rest until you are more awake.  Gradually resume your normal activities over the next 24 hours.   For the next 8 hours, you should be watched by a responsible adult. This person should make sure your condition is not getting worse.   Don't drink any alcohol for the next 24 hours.  Don't drive, operate dangerous machinery, or make important business or personal decisions during the next 24 hours.  Your healthcare provider may tell you not to take any medicine by mouth for pain or sleep in the next 4 hours. These medicines may react with the medicines you were given in the hospital. This could cause a much stronger response than usual.     DIET  At home, begin with liquids and then progress to normal foods if you don't experience nausea.    MEDICATIONS  Continue home medications as before procedure.  You may take Tylenol every four hours as needed for minor discomfort at pad sites or chest soreness.  If you take Coumadin, resume your regimen and continue to have your blood tested weekly as directed by your physician.    FOLLOW UP  You will be scheduled for a follow up appointment and EKG within two weeks of your procedure.              CALL YOUR HEALTHCARE PROVIDER IF YOU START TO HAVE THE FOLLOWING SYMPTOMS:        1.  Drowsiness that doesn't get better       2. Weakness or dizziness that doesn't get better            3. Repeated vomiting

## 2023-08-03 NOTE — ANESTHESIA POSTPROCEDURE EVALUATION
Anesthesia Post Evaluation    Patient: Lucas Garrison    Procedure(s) Performed: Procedure(s) (LRB):  Cardioversion or Defibrillation (N/A)    Final Anesthesia Type: MAC      Patient location during evaluation: GI PACU  Patient participation: Yes- Able to Participate  Level of consciousness: awake and alert  Post-procedure vital signs: reviewed and stable  Pain management: adequate  Airway patency: patent    PONV status at discharge: No PONV  Anesthetic complications: no      Cardiovascular status: blood pressure returned to baseline, hemodynamically stable and stable  Respiratory status: unassisted, room air and spontaneous ventilation  Hydration status: euvolemic  Follow-up not needed.          Vitals Value Taken Time   /78 08/03/23 0841   Temp 37.1 °C (98.8 °F) 08/03/23 0838   Pulse 69 08/03/23 0838   Resp 16 08/03/23 0838   SpO2 97 % 08/03/23 0838         No case tracking events are documented in the log.      Pain/Jay Score: No data recorded

## 2023-08-03 NOTE — TRANSFER OF CARE
"Anesthesia Transfer of Care Note    Patient: Lucas Garrison    Procedure(s) Performed: Procedure(s) (LRB):  Cardioversion or Defibrillation (N/A)    Patient location: Cath Lab    Anesthesia Type: MAC    Transport from OR: Transported from OR on room air with adequate spontaneous ventilation    Post pain: adequate analgesia    Post assessment: no apparent anesthetic complications and tolerated procedure well    Post vital signs: stable    Level of consciousness: responds to stimulation    Nausea/Vomiting: no nausea/vomiting    Complications: none    Transfer of care protocol was followed      Last vitals:   Visit Vitals  /78   Pulse 69   Temp 37.1 °C (98.8 °F) (Temporal)   Resp 16   Ht 5' 11" (1.803 m)   Wt (!) 137 kg (302 lb)   SpO2 97%   BMI 42.12 kg/m²     "

## 2023-08-03 NOTE — DISCHARGE SUMMARY
[facility]  Cardiology  Discharge Summary      Patient Name: Lucas Garrison  MRN: [id number]  Admission Date: 8/3/2023  Hospital Length of Stay: 0 days  Discharge Date and Time: 8/3/2023 10:35 AM  Attending Physician: No att. providers found    Discharging Provider: Baldemar Davalos MD  Primary Care Physician: [facility]    HPI:   No notes on file    Procedure(s) (LRB):  Cardioversion or Defibrillation (N/A)     Indwelling Lines/Drains at time of discharge:  Lines/Drains/Airways       Airway  Duration                  Airway - Non-Surgical 02/22/21 1129 Nasal Cannula 892 days                    Hospital Course:  No notes on file    Goals of Care Treatment Preferences:  Code Status: Full Code      Consults:     Significant Diagnostic Studies: successful DCCV  WILL LOWER TOPROL TO 12.5 MG DAILY     Pending Diagnostic Studies:       None            There are no hospital problems to display for this patient.    No new Assessment & Plan notes have been filed under this hospital service since the last note was generated.  Service: Cardiology      Discharged Condition: good    Disposition: Home or Self Care    Follow Up:    Patient Instructions:   No discharge procedures on file.  Medications:  Reconciled Home Medications:      Medication List        ASK your doctor about these medications      albuterol 90 mcg/actuation inhaler  Commonly known as: PROVENTIL/VENTOLIN HFA  INHALE 2 PUFFS BY MOUTH FOUR TIMES A DAY AS NEEDED FOR BRONCHOSPASM PREVENTION     amitriptyline 50 MG tablet  Commonly known as: ELAVIL  Take 50 mg by mouth every evening.     amLODIPine 10 MG tablet  Commonly known as: NORVASC  Take 1 tablet (10 mg total) by mouth once daily.     apixaban 5 mg Tab  Commonly known as: ELIQUIS  Take 1 tablet (5 mg total) by mouth 2 (two) times daily.     aspirin 81 MG EC tablet  Commonly known as: ECOTRIN  Take 81 mg by mouth once daily.     chlorhexidine 4 % external  liquid  Commonly known as: HIBICLENS  APPLY SMALL AMOUNT TOPICALLY EVERY DAY - USE TO WASH HAIR/SCALP EVERY DAY - THOROUGHLY RINSE THE AREA TO BE CLEANED WITH WATER. APPLY THE MINIMUM AMOUNT OF PRODUCT NECESSARY TO COVER THE SKIN  AND WASH GENTLY.RINSE AGAIN THOROUGHLY     citalopram 40 MG tablet  Commonly known as: CeleXA  Take 40 mg by mouth once daily.     clobetasoL 0.05 % external solution  Commonly known as: TEMOVATE  APPLY MODERATE AMOUNT TOPICALLY TWICE A DAY AS NEEDED TO THE SCALP     EScitalopram oxalate 20 MG tablet  Commonly known as: LEXAPRO  Take 20 mg by mouth once daily.     fish oil-omega-3 fatty acids 300-1,000 mg capsule  Take 1 g by mouth once daily.     flecainide 150 MG Tab  Commonly known as: TAMBOCOR  Take 1 tablet (150 mg total) by mouth every 12 (twelve) hours.     furosemide 80 MG tablet  Commonly known as: LASIX  Take 1 tablet (80 mg total) by mouth 2 (two) times daily.     gabapentin 600 MG tablet  Commonly known as: NEURONTIN  Take 600 mg by mouth.     loratadine 10 mg tablet  Commonly known as: CLARITIN  Take 10 mg by mouth once daily.     losartan 100 MG tablet  Commonly known as: COZAAR  Take 1 tablet (100 mg total) by mouth once daily.     methocarbamoL 750 MG Tab  Commonly known as: ROBAXIN  Take 500 mg by mouth 3 (three) times daily.     metoprolol succinate 25 MG 24 hr tablet  Commonly known as: TOPROL-XL  Take 1 tablet (25 mg total) by mouth once daily.  Notes to patient: Decrease dose to 12.5 daily     mirtazapine 30 MG tablet  Commonly known as: REMERON  Take 30 mg by mouth every evening.     multivitamin capsule  Take 1 capsule by mouth once daily.     naproxen 500 MG tablet  Commonly known as: NAPROSYN  Take 500 mg by mouth 2 (two) times daily with meals.     omeprazole 20 MG capsule  Commonly known as: PRILOSEC  Take 20 mg by mouth once daily.     pravastatin 80 MG tablet  Commonly known as: PRAVACHOL  Take 1 tablet (80 mg total) by mouth once daily.     * prazosin 2 MG  Cap  Commonly known as: MINIPRESS  Take 2 mg by mouth once daily.     * prazosin 5 MG capsule  Commonly known as: MINIPRESS  Take 5 mg by mouth 2 (two) times daily.     QUEtiapine 50 MG tablet  Commonly known as: SEROQUEL  Take 50 mg by mouth nightly. One-half tablet     traZODone 50 MG tablet  Commonly known as: DESYREL  Take 50 mg by mouth every evening.     zolpidem 5 MG Tab  Commonly known as: AMBIEN  Take 1 tablet (5 mg total) by mouth nightly as needed (INSOMNIA).           * This list has 2 medication(s) that are the same as other medications prescribed for you. Read the directions carefully, and ask your doctor or other care provider to review them with you.                  Time spent on the discharge of patient: 30 minutes    Baldemar Davalos MD  Cardiology  [facility] ([facility])

## 2023-08-17 ENCOUNTER — PATIENT MESSAGE (OUTPATIENT)
Dept: CARDIOLOGY | Facility: CLINIC | Age: 73
End: 2023-08-17
Payer: OTHER GOVERNMENT

## 2023-08-18 VITALS
DIASTOLIC BLOOD PRESSURE: 73 MMHG | SYSTOLIC BLOOD PRESSURE: 114 MMHG | OXYGEN SATURATION: 98 % | TEMPERATURE: 98 F | WEIGHT: 302 LBS | BODY MASS INDEX: 42.28 KG/M2 | RESPIRATION RATE: 22 BRPM | HEART RATE: 51 BPM | HEIGHT: 71 IN

## 2023-08-31 ENCOUNTER — OFFICE VISIT (OUTPATIENT)
Dept: CARDIOLOGY | Facility: CLINIC | Age: 73
End: 2023-08-31
Payer: OTHER GOVERNMENT

## 2023-08-31 ENCOUNTER — HOSPITAL ENCOUNTER (OUTPATIENT)
Dept: CARDIOLOGY | Facility: HOSPITAL | Age: 73
Discharge: HOME | End: 2023-08-31
Attending: INTERNAL MEDICINE
Payer: OTHER GOVERNMENT

## 2023-08-31 VITALS
DIASTOLIC BLOOD PRESSURE: 62 MMHG | BODY MASS INDEX: 41.02 KG/M2 | OXYGEN SATURATION: 97 % | HEART RATE: 48 BPM | HEIGHT: 71 IN | SYSTOLIC BLOOD PRESSURE: 108 MMHG | WEIGHT: 293 LBS

## 2023-08-31 DIAGNOSIS — Z00.00 ROUTINE ADULT HEALTH MAINTENANCE: ICD-10-CM

## 2023-08-31 DIAGNOSIS — R06.09 DOE (DYSPNEA ON EXERTION): ICD-10-CM

## 2023-08-31 DIAGNOSIS — G47.33 OSA (OBSTRUCTIVE SLEEP APNEA): ICD-10-CM

## 2023-08-31 DIAGNOSIS — I48.91 ATRIAL FIBRILLATION, UNSPECIFIED TYPE: Primary | ICD-10-CM

## 2023-08-31 DIAGNOSIS — Z77.098 H/O AGENT ORANGE EXPOSURE: ICD-10-CM

## 2023-08-31 DIAGNOSIS — Z00.00 ROUTINE ADULT HEALTH MAINTENANCE: Primary | ICD-10-CM

## 2023-08-31 DIAGNOSIS — E66.01 MORBID OBESITY: ICD-10-CM

## 2023-08-31 DIAGNOSIS — E78.5 HYPERLIPIDEMIA, UNSPECIFIED HYPERLIPIDEMIA TYPE: ICD-10-CM

## 2023-08-31 PROCEDURE — 99999 PR PBB SHADOW E&M-EST. PATIENT-LVL V: CPT | Mod: PBBFAC,,, | Performed by: INTERNAL MEDICINE

## 2023-08-31 PROCEDURE — 99215 OFFICE O/P EST HI 40 MIN: CPT | Mod: PBBFAC | Performed by: INTERNAL MEDICINE

## 2023-08-31 PROCEDURE — 93010 EKG 12-LEAD: ICD-10-PCS | Mod: ,,, | Performed by: INTERNAL MEDICINE

## 2023-08-31 PROCEDURE — 93010 ELECTROCARDIOGRAM REPORT: CPT | Mod: ,,, | Performed by: INTERNAL MEDICINE

## 2023-08-31 PROCEDURE — 99214 OFFICE O/P EST MOD 30 MIN: CPT | Mod: S$PBB,,, | Performed by: INTERNAL MEDICINE

## 2023-08-31 PROCEDURE — 99214 PR OFFICE/OUTPT VISIT, EST, LEVL IV, 30-39 MIN: ICD-10-PCS | Mod: S$PBB,,, | Performed by: INTERNAL MEDICINE

## 2023-08-31 PROCEDURE — 99999 PR PBB SHADOW E&M-EST. PATIENT-LVL V: ICD-10-PCS | Mod: PBBFAC,,, | Performed by: INTERNAL MEDICINE

## 2023-08-31 PROCEDURE — 93005 ELECTROCARDIOGRAM TRACING: CPT

## 2023-08-31 RX ORDER — METOPROLOL SUCCINATE 25 MG/1
12.5 TABLET, EXTENDED RELEASE ORAL DAILY
Qty: 45 TABLET | Refills: 3 | Status: ON HOLD | OUTPATIENT
Start: 2023-08-31 | End: 2023-10-27 | Stop reason: HOSPADM

## 2023-08-31 RX ORDER — FLECAINIDE ACETATE 100 MG/1
100 TABLET ORAL EVERY 12 HOURS
Qty: 60 TABLET | Refills: 11 | Status: ON HOLD | OUTPATIENT
Start: 2023-08-31 | End: 2023-10-26

## 2023-08-31 NOTE — PROGRESS NOTES
Subjective:   Patient ID:  Lucas Garrison is a 72 y.o. male who presents for evaluation of Dizziness and Shortness of Breath      70-YEAR-OLD MALE WITH HISTORY OF HYPERTENSION, OBESITY, OBSTRUCTIVE SLEEP APNEA COMPLIANT WITH CPAP, HYPERLIPIDEMIA.  PATIENT IS A  [facility]  PATIENT WAS HAVING A ROUTINE COLONOSCOPY FOR SCREENING EARLIER TODAY.  HE WAS CAUGHT TO HAVE AFIB NOT AN RVR.  THE OS HE WAS REFERRED FOR EVALUATION.  HE DENIES ANY PALPITATIONS, SYNCOPE, PRESYNCOPE, DIZZINESS.  HE DOES REPORT ORTHOPNEA, DYSPNEA ON EXERTION AND LOWER EXTREMITY SWELLING.  HE ADMITS TO NO SALT RESTRICTION.  HE DENIES ANY CHEST PAIN, CLAUDICATION, BLEEDING, MELENA OR HEMATEMESIS,  HE STATES THAT HE HAD A NORMAL HEART CATHETERIZATION 10 YEARS AGO    5/10/2021   Feels better but still complains of SINGH , felt better after the mario/dccv  But lately singh again, with mild LE swelling, + mild orthopnea  ekg sinus bradycardia, non specific st t waves   Increase lasix, less salt     6/10/2021   Had abnormal stress test after last visit , cath was normal cors   Mildly elevated left sided pressure   Still feeling dyspnea   euvolemic on exam   No chest pain, in sinus rhythm   No other complaints except for insomnia   Cath site right radial and right antecubital no issues she would    Shortness of Breath  Associated symptoms include leg swelling. Pertinent negatives include no chest pain or syncope.   Dizziness: no syncope, no palpitations and no chest pain.    1.3.2021  DOING WELL.  Denies chest pain.  Still reports dyspnea on exertion however better compared to in the past.  NYHA 1.   Still not completely adherent with low salt  Known to have mildly elevated LVEDP on his catheterization last year.  With normal coronaries.  No palpitations, no bleeding while on Eliquis.  Not on aspirin.  Blood pressure not at goal states he was rushing to the clinic today.  Will switch his Toprol to bisoprolol.  He states  [facility] and he had [facility] as per the patient himself however no documents  He is taking Lasix 40 mg twice a day.  Asking for  a new prescription before he runs out of the medication      6.30.2022  Doing well , had normal cath a year ago  No CP   SINGH resolved after last med adjustments   Needs refill   SEE ROS     12.13.2022  Comes in for a 6 months follow up   Still has his chronic SINGH, still [facility]  He has dependent lower ext edema , but also has orthopnea , singh and lvedp 15 mmhg on cath   Uses his lasix twice a day , states good diuresis   No chest pain   No palpitations  He states [facility] and not [facility] his meds I reviewed the meds   No bleeding   Uses his CPAP   Normal cath 2021    6.15.2023  In for six-month follow-up.    Having dyspnea on exertion NYHA 2.  Orthopnea PND.  Worsened in the last 2-3 weeks.  On EKG his AFib rate control.    He states he is taking the Lasix only once a day  States he avoiding any salt.    Had normal catheterization 2021.    His last NATIVIDAD cardioversion 2021 7.6.2023.  Had recent NATIVIDAD cardioversion 2 weeks ago, while on Toprol.  Started on flecainide post cardioversion.    He is currently back in AFib.    Uses his CPAP nightly.    He states that he feels better since his cardioversion and has not gotten worse lately.    However he is noted to have lower extremity swelling.    He states that he already takes Lasix 80 mg in the morning and 40 mg in the evening.  States he is able to mow his lawn.  Feels better than when he was seen in clinic before his cardioversion.  Denies palpitations.      7.27.2023  Comes in for 3 weeks follow-up.  I increased his flecainide to 100 mg on his last visit in the hope to flu him back into sinus rhythm.    He still in AFib today rate controlled.    Has not seen EP yet.    He states that he started feeling again his dyspnea on exertion.  He states that walking  through the parking was struggle.  He states that he was feeling better after his cardioversion.    Again he has lower extremity swelling.        8.31.2023  Comes in for follow-up.  He had his cardioversion done on August 3rd.  He is still in sinus.  His currently on flecainide 150 mg b.i.d. and Toprol 25 mg however he is got sinus bradycardia today.  He has some fatigue however no orthopnea or PND.  No palpitations.  No chest pain.    No syncope or presyncope.  He works in his [employer] yesterday all day.        Past Medical History:   Diagnosis Date    Cancer     DDD (degenerative disc disease)     H/O prostate cancer     HLD (hyperlipidemia)     Hypertension     Lumbago     REGINA (obstructive sleep apnea)     Peripheral neuropathy     PTSD (post-traumatic stress disorder)        Past Surgical History:   Procedure Laterality Date    CATHETERIZATION OF BOTH LEFT AND RIGHT HEART N/A 6/4/2021    Procedure: CATHETERIZATION, HEART, BOTH LEFT AND RIGHT;  Surgeon: Baldemar Davalos MD;  Location: [facility] CATH LAB;  Service: Cardiology;  Laterality: N/A;    COLONOSCOPY N/A 12/7/2016    Procedure: COLONOSCOPY;  Surgeon: Zeus Guerrier MD;  Location: [facility] ENDO;  Service: Endoscopy;  Laterality: N/A;    COLONOSCOPY N/A 2/22/2021    Procedure: COLONOSCOPY;  Surgeon: Joselyn Castanon MD;  Location: [facility] ENDO;  Service: Endoscopy;  Laterality: N/A;    CORONARY ANGIOGRAPHY N/A 6/4/2021    Procedure: ANGIOGRAM, CORONARY ARTERY;  Surgeon: Baldemar Davalos MD;  Location: [facility] CATH LAB;  Service: Cardiology;  Laterality: N/A;    prostate radiation seeds  2010    RIGHT HEART CATHETERIZATION Right 6/4/2021    Procedure: INSERTION, CATHETER, RIGHT HEART;  Surgeon: Baldemar Davalos MD;  Location: [facility] CATH LAB;  Service: Cardiology;  Laterality: Right;    TONSILLECTOMY      TRANSESOPHAGEAL ECHOCARDIOGRAM WITH POSSIBLE CARDIOVERSION (NATIVIDAD W/ POSS CARDIOVERSION) N/A 6/20/2023    Procedure: Transesophageal echo (NATIVIDAD) intra-procedure log  documentation;  Surgeon: Baldemar Davalos MD;  Location: [facility] CATH LAB;  Service: Cardiology;  Laterality: N/A;    TRANSURETHRAL RESECTION OF PROSTATE      TREATMENT OF CARDIAC ARRHYTHMIA N/A 8/3/2023    Procedure: Cardioversion or Defibrillation;  Surgeon: Baldemar Davalos MD;  Location: [facility] CATH LAB;  Service: Cardiology;  Laterality: N/A;       Social History     Tobacco Use    Smoking status: Former     Current packs/day: 0.00     Average packs/day: 1.5 packs/day for 10.0 years (15.0 ttl pk-yrs)     Types: Cigarettes     Start date: 10/30/1972     Quit date: 10/30/1982     Years since quittin.8   Substance Use Topics    Alcohol use: No    Drug use: No       Family History   Problem Relation Age of Onset    COPD Mother     Depression Brother     Colon cancer Neg Hx        Review of Systems   Cardiovascular:  Positive for leg swelling. Negative for chest pain, dyspnea on exertion, palpitations and syncope.   Respiratory:  Negative for shortness of breath.    Genitourinary: Negative.    Neurological:  Positive for dizziness.       Current Outpatient Medications on File Prior to Visit   Medication Sig    albuterol (PROVENTIL/VENTOLIN HFA) 90 mcg/actuation inhaler INHALE 2 PUFFS BY MOUTH FOUR TIMES A DAY AS NEEDED FOR BRONCHOSPASM PREVENTION    amitriptyline (ELAVIL) 50 MG tablet Take 50 mg by mouth every evening.    amLODIPine (NORVASC) 10 MG tablet Take 1 tablet (10 mg total) by mouth once daily.    apixaban (ELIQUIS) 5 mg Tab Take 1 tablet (5 mg total) by mouth 2 (two) times daily.    aspirin (ECOTRIN) 81 MG EC tablet Take 81 mg by mouth once daily.    chlorhexidine (HIBICLENS) 4 % external liquid APPLY SMALL AMOUNT TOPICALLY EVERY DAY - USE TO WASH HAIR/SCALP EVERY DAY - THOROUGHLY RINSE THE AREA TO BE CLEANED WITH WATER. APPLY THE MINIMUM AMOUNT OF PRODUCT NECESSARY TO COVER THE SKIN  AND WASH GENTLY.RINSE AGAIN THOROUGHLY    citalopram (CELEXA) 40 MG tablet Take 40 mg by mouth once daily.    clobetasoL  (TEMOVATE) 0.05 % external solution APPLY MODERATE AMOUNT TOPICALLY TWICE A DAY AS NEEDED TO THE SCALP    EScitalopram oxalate (LEXAPRO) 20 MG tablet Take 20 mg by mouth once daily.    fish oil-omega-3 fatty acids 300-1,000 mg capsule Take 1 g by mouth once daily.    furosemide (LASIX) 80 MG tablet Take 1 tablet (80 mg total) by mouth 2 (two) times daily.    gabapentin (NEURONTIN) 600 MG tablet Take 600 mg by mouth.    loratadine (CLARITIN) 10 mg tablet Take 10 mg by mouth once daily.    losartan (COZAAR) 100 MG tablet Take 1 tablet (100 mg total) by mouth once daily.    methocarbamol (ROBAXIN) 750 MG Tab Take 500 mg by mouth 3 (three) times daily.    mirtazapine (REMERON) 30 MG tablet Take 30 mg by mouth every evening.    multivitamin capsule Take 1 capsule by mouth once daily.    naproxen (NAPROSYN) 500 MG tablet Take 500 mg by mouth 2 (two) times daily with meals.    omeprazole (PRILOSEC) 20 MG capsule Take 20 mg by mouth once daily.    pravastatin (PRAVACHOL) 80 MG tablet Take 1 tablet (80 mg total) by mouth once daily.    prazosin (MINIPRESS) 2 MG Cap Take 2 mg by mouth once daily.    prazosin (MINIPRESS) 5 MG capsule Take 5 mg by mouth 2 (two) times daily.    QUEtiapine (SEROQUEL) 50 MG tablet Take 50 mg by mouth nightly. One-half tablet    traZODone (DESYREL) 50 MG tablet Take 50 mg by mouth every evening.    zolpidem (AMBIEN) 5 MG Tab Take 1 tablet (5 mg total) by mouth nightly as needed (INSOMNIA). (Patient taking differently: Take 10 mg by mouth nightly as needed (INSOMNIA).)    [DISCONTINUED] flecainide (TAMBOCOR) 150 MG Tab Take 1 tablet (150 mg total) by mouth every 12 (twelve) hours.    [DISCONTINUED] metoprolol succinate (TOPROL-XL) 25 MG 24 hr tablet Take 1 tablet (25 mg total) by mouth once daily.     No current facility-administered medications on file prior to visit.       Objective:   Objective:  Wt Readings from Last 3 Encounters:   08/31/23 132.9 kg (292 lb 15.9 oz)   08/03/23 (!) 137 kg (302  "lb)   07/27/23 (!) 137 kg (302 lb 0.5 oz)     Temp Readings from Last 3 Encounters:   08/03/23 98.1 °F (36.7 °C)   06/20/23 97.7 °F (36.5 °C) (Temporal)   06/04/21 97.9 °F (36.6 °C)     BP Readings from Last 3 Encounters:   08/31/23 108/62   08/03/23 114/73   07/27/23 114/76     Pulse Readings from Last 3 Encounters:   08/31/23 (!) 48   08/03/23 (!) 51   07/27/23 95       Physical Exam  Vitals reviewed.   Constitutional:       Appearance: He is well-developed.   Neck:      Vascular: No carotid bruit.   Cardiovascular:      Rate and Rhythm: Rhythm irregular.      Pulses: Intact distal pulses.      Heart sounds: Normal heart sounds. No murmur heard.  Pulmonary:      Breath sounds: Normal breath sounds.   Musculoskeletal:         General: Swelling present.   Neurological:      Mental Status: He is oriented to person, place, and time.         No results found for: "CHOL"  No results found for: "HDL"  No results found for: "LDLCALC"  No results found for: "TRIG"  No results found for: "CHOLHDL"    Chemistry        Component Value Date/Time     07/27/2023 1521    K 4.7 07/27/2023 1521     07/27/2023 1521    CO2 28 07/27/2023 1521    BUN 20 07/27/2023 1521    CREATININE 1.6 (H) 07/27/2023 1521     07/27/2023 1521        Component Value Date/Time    CALCIUM 9.8 07/27/2023 1521    ALKPHOS 52 (L) 04/06/2021 0729    AST 32 04/06/2021 0729    ALT 45 (H) 04/06/2021 0729    BILITOT 0.7 04/06/2021 0729    ESTGFRAFRICA >60.0 01/03/2022 1234    EGFRNONAA >60.0 01/03/2022 1234          No results found for: "TSH"  Lab Results   Component Value Date    INR 1.1 07/27/2023    INR 1.0 06/15/2023    INR 1.0 06/01/2021     Lab Results   Component Value Date    WBC 5.12 07/27/2023    HGB 16.0 07/27/2023    HCT 46.8 07/27/2023     (H) 07/27/2023     07/27/2023     BNP  @LABRCNTIP(BNP,BNPTRIAGEBLO)@  CrCl cannot be calculated (Patient's most recent lab result is older than the maximum 7 days allowed.).   "   Imaging:  ======  No results found for this or any previous visit.    No results found for this or any previous visit.    No results found for this or any previous visit.    No results found for this or any previous visit.    No results found for this or any previous visit.    No valid procedures specified.    Diagnostic Results:  ECG: Reviewed, nsr after [facility]       States he had a normal heart cath in 2010 with Dr Preciado       2021   Left Main   The vessel is large.   Left Anterior Descending   First Diagonal Branch   The vessel is large.   Ramus Intermedius   The vessel is large.   Left Circumflex   The vessel is large.   First Obtuse Marginal Branch   The vessel is large.   Right Coronary Artery   The vessel is large.   Right Posterior Descending Artery   The vessel is large.   Right Posterior Atrioventricular Artery   The vessel is angiographically normal.   Intervention    No interventions have been documented.  Left Heart Findings    Left Ventricle    LVEDP (Pre):15 mmHG   Aortic Valve    No gradient on pullback   Right Heart Findings    Right Heart Pressures    RA: 8 RV: 38/ 8 PA: 36/ 17/ 22 PWP: 15 .   Cardiac output was 8. Cardiac index is 3.22 L/min/m2.   O2 Sat: PA 75%.       The ASCVD Risk score (Cora DK, et al., 2019) failed to calculate for the following reasons:    Cannot find a previous HDL lab    Cannot find a previous total cholesterol lab    Assessment and Plan:   Atrial fibrillation, unspecified type  -     Ambulatory referral/consult to Electrophysiology; Future; Expected date: 09/07/2023    SINGH (dyspnea on exertion)  -     flecainide (TAMBOCOR) 100 MG Tab; Take 1 tablet (100 mg total) by mouth every 12 (twelve) hours.  Dispense: 60 tablet; Refill: 11  -     metoprolol succinate (TOPROL-XL) 25 MG 24 hr tablet; Take 0.5 tablets (12.5 mg total) by mouth once daily.  Dispense: 45 tablet; Refill: 3    Hyperlipidemia, unspecified hyperlipidemia type    H/O agent Orange exposure    Morbid  obesity    REGINA (obstructive sleep apnea)    Will decrease his flecainide and Toprol given his bradycardia.    Refer to EP again.  He missed his appointment.  He is compliant with the CPAP.    Reviewed all tests and above medical conditions with patient in detail and formulated treatment plan.  Risk factor modification discussed.   Cardiac low salt diet discussed.  Maintaining healthy weight and weight loss goals were discussed in clinic.      Follow up in 4 weeks

## 2023-09-05 ENCOUNTER — PATIENT MESSAGE (OUTPATIENT)
Dept: CARDIOLOGY | Facility: CLINIC | Age: 73
End: 2023-09-05
Payer: OTHER GOVERNMENT

## 2023-09-05 DIAGNOSIS — I48.91 ATRIAL FIBRILLATION, UNSPECIFIED TYPE: ICD-10-CM

## 2023-09-05 DIAGNOSIS — R06.09 DOE (DYSPNEA ON EXERTION): Primary | ICD-10-CM

## 2023-09-06 ENCOUNTER — HOSPITAL ENCOUNTER (OUTPATIENT)
Dept: CARDIOLOGY | Facility: HOSPITAL | Age: 73
Discharge: HOME | End: 2023-09-06
Attending: INTERNAL MEDICINE
Payer: OTHER GOVERNMENT

## 2023-09-06 ENCOUNTER — OFFICE VISIT (OUTPATIENT)
Dept: CARDIOLOGY | Facility: CLINIC | Age: 73
End: 2023-09-06
Payer: OTHER GOVERNMENT

## 2023-09-06 VITALS
DIASTOLIC BLOOD PRESSURE: 70 MMHG | SYSTOLIC BLOOD PRESSURE: 120 MMHG | BODY MASS INDEX: 40.58 KG/M2 | HEART RATE: 50 BPM | WEIGHT: 289.88 LBS | HEIGHT: 71 IN | OXYGEN SATURATION: 96 %

## 2023-09-06 DIAGNOSIS — I48.91 ATRIAL FIBRILLATION, UNSPECIFIED TYPE: ICD-10-CM

## 2023-09-06 DIAGNOSIS — R06.09 DOE (DYSPNEA ON EXERTION): ICD-10-CM

## 2023-09-06 PROCEDURE — 99999 PR PBB SHADOW E&M-EST. PATIENT-LVL V: ICD-10-PCS | Mod: PBBFAC,,, | Performed by: INTERNAL MEDICINE

## 2023-09-06 PROCEDURE — 99215 OFFICE O/P EST HI 40 MIN: CPT | Mod: PBBFAC | Performed by: INTERNAL MEDICINE

## 2023-09-06 PROCEDURE — 93005 ELECTROCARDIOGRAM TRACING: CPT

## 2023-09-06 PROCEDURE — 93010 ELECTROCARDIOGRAM REPORT: CPT | Mod: ,,, | Performed by: INTERNAL MEDICINE

## 2023-09-06 PROCEDURE — 99205 PR OFFICE/OUTPT VISIT, NEW, LEVL V, 60-74 MIN: ICD-10-PCS | Mod: S$PBB,,, | Performed by: INTERNAL MEDICINE

## 2023-09-06 PROCEDURE — 99999 PR PBB SHADOW E&M-EST. PATIENT-LVL V: CPT | Mod: PBBFAC,,, | Performed by: INTERNAL MEDICINE

## 2023-09-06 PROCEDURE — 99205 OFFICE O/P NEW HI 60 MIN: CPT | Mod: S$PBB,,, | Performed by: INTERNAL MEDICINE

## 2023-09-06 PROCEDURE — 93010 EKG 12-LEAD: ICD-10-PCS | Mod: ,,, | Performed by: INTERNAL MEDICINE

## 2023-09-06 NOTE — PROGRESS NOTES
Subjective:   Patient ID:  Lucas Garrison is a 72 y.o. male who presents for evaluation of Atrial Fibrillation      72 yoM here for arrhythmia management. He developed symptomatic persistent AF leading to NATIVIDAD/CV twice in August 2023. He had NATIVIDAD/CV 4/21. NSR 5/21, AF noted 6/23. EF normal. CVA prophylaxis with xarelto. He has been on amiodarone in the past (4-6/21). He was placed on flecainide a few months ago and required increased dose due to breakthrough AF event which has resulted in symptomatic bradycardia.     NATIVIDAD 8/23:  · The left ventricle is normal in size with normal systolic function.  · Normal right ventricular size with normal right ventricular systolic function.  · Normal central venous pressure (3 mmHg).  · Atrial fibrillation observed.  · Normal appearing left atrial appendage. No thrombus is present in the appendage.  · Mild mitral regurgitation.  · Mild tricuspid regurgitation.  · The estimated ejection fraction is 60%.  · A 200 J synchronized cardioversion was successfully performed with restoration of normal sinus rhythm.     Past Medical History:  No date: Cancer  No date: DDD (degenerative disc disease)  No date: H/O prostate cancer  No date: HLD (hyperlipidemia)  No date: Hypertension  No date: Lumbago  No date: REGINA (obstructive sleep apnea)  No date: Peripheral neuropathy  No date: PTSD (post-traumatic stress disorder)    Past Surgical History:  6/4/2021: CATHETERIZATION OF BOTH LEFT AND RIGHT HEART; N/A      Comment:  Procedure: CATHETERIZATION, HEART, BOTH LEFT AND RIGHT;                Surgeon: Baldemar Davalos MD;  Location: [facility] CATH LAB;                Service: Cardiology;  Laterality: N/A;  12/7/2016: COLONOSCOPY; N/A      Comment:  Procedure: COLONOSCOPY;  Surgeon: Zeus Guerrier MD;  Location: [facility] ENDO;  Service: Endoscopy;                 Laterality: N/A;  2/22/2021: COLONOSCOPY; N/A      Comment:  Procedure: COLONOSCOPY;  Surgeon: Joselyn Castanon                 MD;  Location: [facility] ENDO;  Service: Endoscopy;                 Laterality: N/A;  2021: CORONARY ANGIOGRAPHY; N/A      Comment:  Procedure: ANGIOGRAM, CORONARY ARTERY;  Surgeon: Baldemar Davalos MD;  Location: [facility] CATH LAB;  Service:                Cardiology;  Laterality: N/A;  2010: prostate radiation seeds  2021: RIGHT HEART CATHETERIZATION; Right      Comment:  Procedure: INSERTION, CATHETER, RIGHT HEART;  Surgeon:                Baldemar Davalos MD;  Location: [facility] CATH LAB;  Service:               Cardiology;  Laterality: Right;  No date: TONSILLECTOMY  2023: TRANSESOPHAGEAL ECHOCARDIOGRAM WITH POSSIBLE CARDIOVERSION   (NATIVIDAD W/ POSS CARDIOVERSION); N/A      Comment:  Procedure: Transesophageal echo (NATIVIDAD) intra-procedure                log documentation;  Surgeon: Baldemar Davalos MD;                 Location: [facility] CATH LAB;  Service: Cardiology;                 Laterality: N/A;  No date: TRANSURETHRAL RESECTION OF PROSTATE  8/3/2023: TREATMENT OF CARDIAC ARRHYTHMIA; N/A      Comment:  Procedure: Cardioversion or Defibrillation;  Surgeon:                Baldemar Davalos MD;  Location: [facility] CATH LAB;                 Service: Cardiology;  Laterality: N/A;    Social History    Socioeconomic History      Marital status:     Tobacco Use      Smoking status: Former        Packs/day: 0.00        Years: 1.5 packs/day for 10.0 years (15.0 ttl pk-yrs)        Types: Cigarettes        Start date: 10/30/1972        Quit date: 10/30/1982        Years since quittin.8    Substance and Sexual Activity      Alcohol use: No      Drug use: No      Review of patient's family history indicates:  Problem: COPD      Relation: Mother          Age of Onset: (Not Specified)  Problem: Depression      Relation: Brother          Age of Onset: (Not Specified)  Problem: Colon cancer      Relation: Neg Hx          Age of Onset: (Not Specified)      Current Outpatient Medications:  albuterol  (PROVENTIL/VENTOLIN HFA) 90 mcg/actuation inhaler, INHALE 2 PUFFS BY MOUTH FOUR TIMES A DAY AS NEEDED FOR BRONCHOSPASM PREVENTION, Disp: , Rfl:   amitriptyline (ELAVIL) 50 MG tablet, Take 50 mg by mouth every evening., Disp: , Rfl:   amLODIPine (NORVASC) 10 MG tablet, Take 1 tablet (10 mg total) by mouth once daily., Disp: 90 tablet, Rfl: 3  apixaban (ELIQUIS) 5 mg Tab, Take 1 tablet (5 mg total) by mouth 2 (two) times daily., Disp: 180 tablet, Rfl: 3  aspirin (ECOTRIN) 81 MG EC tablet, Take 81 mg by mouth once daily., Disp: , Rfl:   chlorhexidine (HIBICLENS) 4 % external liquid, APPLY SMALL AMOUNT TOPICALLY EVERY DAY - USE TO WASH HAIR/SCALP EVERY DAY - THOROUGHLY RINSE THE AREA TO BE CLEANED WITH WATER. APPLY THE MINIMUM AMOUNT OF PRODUCT NECESSARY TO COVER THE SKIN  AND WASH GENTLY.RINSE AGAIN THOROUGHLY, Disp: , Rfl:   citalopram (CELEXA) 40 MG tablet, Take 40 mg by mouth once daily., Disp: , Rfl:   clobetasoL (TEMOVATE) 0.05 % external solution, APPLY MODERATE AMOUNT TOPICALLY TWICE A DAY AS NEEDED TO THE SCALP, Disp: , Rfl:   EScitalopram oxalate (LEXAPRO) 20 MG tablet, Take 20 mg by mouth once daily., Disp: , Rfl:   fish oil-omega-3 fatty acids 300-1,000 mg capsule, Take 1 g by mouth once daily., Disp: , Rfl:   flecainide (TAMBOCOR) 100 MG Tab, Take 1 tablet (100 mg total) by mouth every 12 (twelve) hours., Disp: 60 tablet, Rfl: 11  furosemide (LASIX) 80 MG tablet, Take 1 tablet (80 mg total) by mouth 2 (two) times daily., Disp: 60 tablet, Rfl: 11  gabapentin (NEURONTIN) 600 MG tablet, Take 600 mg by mouth., Disp: , Rfl:   loratadine (CLARITIN) 10 mg tablet, Take 10 mg by mouth once daily., Disp: , Rfl:   losartan (COZAAR) 100 MG tablet, Take 1 tablet (100 mg total) by mouth once daily., Disp: 90 tablet, Rfl: 3  methocarbamol (ROBAXIN) 750 MG Tab, Take 500 mg by mouth 3 (three) times daily., Disp: , Rfl:   metoprolol succinate (TOPROL-XL) 25 MG 24 hr tablet, Take 0.5 tablets (12.5 mg total) by mouth once  daily., Disp: 45 tablet, Rfl: 3  mirtazapine (REMERON) 30 MG tablet, Take 30 mg by mouth every evening., Disp: , Rfl:   multivitamin capsule, Take 1 capsule by mouth once daily., Disp: , Rfl:   naproxen (NAPROSYN) 500 MG tablet, Take 500 mg by mouth 2 (two) times daily with meals., Disp: , Rfl:   omeprazole (PRILOSEC) 20 MG capsule, Take 20 mg by mouth once daily., Disp: , Rfl:   pravastatin (PRAVACHOL) 80 MG tablet, Take 1 tablet (80 mg total) by mouth once daily., Disp: 90 tablet, Rfl: 3  prazosin (MINIPRESS) 2 MG Cap, Take 2 mg by mouth once daily., Disp: , Rfl:    prazosin (MINIPRESS) 5 MG capsule, Take 5 mg by mouth 2 (two) times daily., Disp: , Rfl:   QUEtiapine (SEROQUEL) 50 MG tablet, Take 50 mg by mouth nightly. One-half tablet, Disp: , Rfl:   traZODone (DESYREL) 50 MG tablet, Take 50 mg by mouth every evening., Disp: , Rfl:   zolpidem (AMBIEN) 5 MG Tab, Take 1 tablet (5 mg total) by mouth nightly as needed (INSOMNIA). (Patient taking differently: Take 10 mg by mouth nightly as needed (INSOMNIA).), Disp: 30 tablet, Rfl: 5    No current facility-administered medications for this visit.            Review of Systems   Constitutional: Negative.   HENT: Negative.     Eyes: Negative.    Cardiovascular:  Positive for leg swelling. Negative for chest pain, dyspnea on exertion, near-syncope, palpitations and syncope.   Respiratory: Negative.  Negative for shortness of breath.    Endocrine: Negative.    Hematologic/Lymphatic: Negative.    Skin: Negative.    Musculoskeletal: Negative.    Gastrointestinal: Negative.    Genitourinary: Negative.    Neurological:  Positive for dizziness. Negative for light-headedness.   Psychiatric/Behavioral: Negative.     Allergic/Immunologic: Negative.        Objective:   Physical Exam  Vitals reviewed.   Constitutional:       General: He is not in acute distress.     Appearance: He is well-developed.   HENT:      Head: Normocephalic and atraumatic.   Eyes:      Pupils: Pupils are  equal, round, and reactive to light.   Neck:      Thyroid: No thyromegaly.      Vascular: No JVD.   Cardiovascular:      Rate and Rhythm: Regular rhythm. Bradycardia present.      Chest Wall: PMI is not displaced.      Heart sounds: Normal heart sounds, S1 normal and S2 normal. No murmur heard.     No friction rub. No gallop.   Pulmonary:      Effort: Pulmonary effort is normal. No respiratory distress.      Breath sounds: Normal breath sounds. No wheezing or rales.   Abdominal:      General: Bowel sounds are normal. There is no distension.      Palpations: Abdomen is soft.      Tenderness: There is no abdominal tenderness. There is no guarding or rebound.   Musculoskeletal:         General: No tenderness. Normal range of motion.      Cervical back: Normal range of motion.   Skin:     General: Skin is warm and dry.      Findings: No erythema or rash.   Neurological:      Mental Status: He is alert and oriented to person, place, and time.      Cranial Nerves: No cranial nerve deficit.   Psychiatric:         Behavior: Behavior normal.         Thought Content: Thought content normal.         Judgment: Judgment normal.       Assessment:      1. Atrial fibrillation, unspecified type        Plan:   72 yoM here for AF management. He has symptomatic, persistent AF refractory to AAD therapy and cardioversion as well as an intolerance to increased medical therapy. I had extensive discussion with patient regarding risks and benefits of PVI/WACA, and the patient would like to proceed. Risks of procedure include (but are not limited to) bleeding, stroke, perforation requiring emergency draining or surgery, AV block, death, AV fistula, AE fistula, PV stenosis.    Last anti-coagulation dose night prior to procedure.  Discontinue antiarrhytmic drugs 4 days prior to the procedure.     RF PVI  Anesthesia  NATIVIDAD prior, cancel if NSR  CHELSI    He would prefer an afternoon case and would like to stay overnight

## 2023-09-15 ENCOUNTER — TELEPHONE (OUTPATIENT)
Dept: ELECTROPHYSIOLOGY | Facility: CLINIC | Age: 73
End: 2023-09-15
Payer: OTHER GOVERNMENT

## 2023-09-15 NOTE — TELEPHONE ENCOUNTER
Attempted to contact Ms. Orozco to schedule Mr. Zavala's procedure with Dr. Marquez. No answer. Left voicemail with callback number.

## 2023-09-15 NOTE — TELEPHONE ENCOUNTER
----- Message from Ree Hodgson sent at 9/14/2023  4:01 PM CDT -----  Regarding: [facility] procedure  Pam [phone] pt wife would like a call to [facility] his procedue that he and Dr. Marquez spoke about.    Thanks

## 2023-09-19 ENCOUNTER — TELEPHONE (OUTPATIENT)
Dept: ELECTROPHYSIOLOGY | Facility: CLINIC | Age: 73
End: 2023-09-19
Payer: OTHER GOVERNMENT

## 2023-09-19 NOTE — TELEPHONE ENCOUNTER
I spoke with Ms. Orozco, patient's wife,  and scheduled his procedure for 10/26/23. Procedure details reviewed and instructions will be sent via patient portal as requested.

## 2023-09-19 NOTE — TELEPHONE ENCOUNTER
----- Message from Milady Mcmillan sent at 9/19/2023  2:19 PM CDT -----  Regarding: procedure  Who Called: Sarahi Marcelo  Phone Number To Be Reached: [phone]  Patient Request: Appointment     Procedure appt    Thank you

## 2023-09-22 DIAGNOSIS — I48.0 PAROXYSMAL ATRIAL FIBRILLATION: Primary | ICD-10-CM

## 2023-09-28 ENCOUNTER — PATIENT MESSAGE (OUTPATIENT)
Dept: ELECTROPHYSIOLOGY | Facility: CLINIC | Age: 73
End: 2023-09-28
Payer: OTHER GOVERNMENT

## 2023-10-19 ENCOUNTER — LAB VISIT (OUTPATIENT)
Dept: LAB | Facility: HOSPITAL | Age: 73
End: 2023-10-19
Attending: INTERNAL MEDICINE
Payer: OTHER GOVERNMENT

## 2023-10-19 DIAGNOSIS — I48.0 PAROXYSMAL ATRIAL FIBRILLATION: ICD-10-CM

## 2023-10-19 LAB
APTT PPP: 28.8 SEC (ref 21–32)
INR PPP: 1.1 (ref 0.8–1.2)
PROTHROMBIN TIME: 11.3 SEC (ref 9–12.5)

## 2023-10-19 PROCEDURE — 36415 COLL VENOUS BLD VENIPUNCTURE: CPT | Performed by: INTERNAL MEDICINE

## 2023-10-19 PROCEDURE — 85610 PROTHROMBIN TIME: CPT | Performed by: INTERNAL MEDICINE

## 2023-10-19 PROCEDURE — 85730 THROMBOPLASTIN TIME PARTIAL: CPT | Performed by: INTERNAL MEDICINE

## 2023-10-19 PROCEDURE — 85027 COMPLETE CBC AUTOMATED: CPT | Performed by: INTERNAL MEDICINE

## 2023-10-19 PROCEDURE — 80048 BASIC METABOLIC PNL TOTAL CA: CPT | Performed by: INTERNAL MEDICINE

## 2023-10-20 LAB
ANION GAP SERPL CALC-SCNC: 13 MMOL/L (ref 8–16)
BUN SERPL-MCNC: 16 MG/DL (ref 8–23)
CALCIUM SERPL-MCNC: 9.6 MG/DL (ref 8.7–10.5)
CHLORIDE SERPL-SCNC: 107 MMOL/L (ref 95–110)
CO2 SERPL-SCNC: 25 MMOL/L (ref 23–29)
CREAT SERPL-MCNC: 1.5 MG/DL (ref 0.5–1.4)
ERYTHROCYTE [DISTWIDTH] IN BLOOD BY AUTOMATED COUNT: 12.2 % (ref 11.5–14.5)
EST. GFR  (NO RACE VARIABLE): 48.6 ML/MIN/1.73 M^2
GLUCOSE SERPL-MCNC: 111 MG/DL (ref 70–110)
HCT VFR BLD AUTO: 47 % (ref 40–54)
HGB BLD-MCNC: 15.7 G/DL (ref 14–18)
MCH RBC QN AUTO: 33.3 PG (ref 27–31)
MCHC RBC AUTO-ENTMCNC: 33.4 G/DL (ref 32–36)
MCV RBC AUTO: 100 FL (ref 82–98)
PLATELET # BLD AUTO: 210 K/UL (ref 150–450)
PMV BLD AUTO: 10.8 FL (ref 9.2–12.9)
POTASSIUM SERPL-SCNC: 4.6 MMOL/L (ref 3.5–5.1)
RBC # BLD AUTO: 4.72 M/UL (ref 4.6–6.2)
SODIUM SERPL-SCNC: 145 MMOL/L (ref 136–145)
WBC # BLD AUTO: 5.5 K/UL (ref 3.9–12.7)

## 2023-10-24 ENCOUNTER — TELEPHONE (OUTPATIENT)
Dept: ELECTROPHYSIOLOGY | Facility: CLINIC | Age: 73
End: 2023-10-24
Payer: MEDICARE

## 2023-10-25 NOTE — SUBJECTIVE & OBJECTIVE
Past Medical History:   Diagnosis Date    Cancer     DDD (degenerative disc disease)     H/O prostate cancer     HLD (hyperlipidemia)     Hypertension     Lumbago     REGINA (obstructive sleep apnea)     Peripheral neuropathy     PTSD (post-traumatic stress disorder)        Past Surgical History:   Procedure Laterality Date    CATHETERIZATION OF BOTH LEFT AND RIGHT HEART N/A 6/4/2021    Procedure: CATHETERIZATION, HEART, BOTH LEFT AND RIGHT;  Surgeon: Baldemar Davalos MD;  Location: [facility] CATH LAB;  Service: Cardiology;  Laterality: N/A;    COLONOSCOPY N/A 12/7/2016    Procedure: COLONOSCOPY;  Surgeon: Zeus Guerrier MD;  Location: [facility] ENDO;  Service: Endoscopy;  Laterality: N/A;    COLONOSCOPY N/A 2/22/2021    Procedure: COLONOSCOPY;  Surgeon: Joselyn Castanon MD;  Location: [facility] ENDO;  Service: Endoscopy;  Laterality: N/A;    CORONARY ANGIOGRAPHY N/A 6/4/2021    Procedure: ANGIOGRAM, CORONARY ARTERY;  Surgeon: Baldemar Davalos MD;  Location: [facility] CATH LAB;  Service: Cardiology;  Laterality: N/A;    prostate radiation seeds  2010    RIGHT HEART CATHETERIZATION Right 6/4/2021    Procedure: INSERTION, CATHETER, RIGHT HEART;  Surgeon: Baldemar Davalos MD;  Location: [facility] CATH LAB;  Service: Cardiology;  Laterality: Right;    TONSILLECTOMY      TRANSESOPHAGEAL ECHOCARDIOGRAM WITH POSSIBLE CARDIOVERSION (NATIVIDAD W/ POSS CARDIOVERSION) N/A 6/20/2023    Procedure: Transesophageal echo (NATIVIDAD) intra-procedure log documentation;  Surgeon: Baldemar Davalos MD;  Location: [facility] CATH LAB;  Service: Cardiology;  Laterality: N/A;    TRANSURETHRAL RESECTION OF PROSTATE      TREATMENT OF CARDIAC ARRHYTHMIA N/A 8/3/2023    Procedure: Cardioversion or Defibrillation;  Surgeon: Baldemar Davalos MD;  Location: [facility] CATH LAB;  Service: Cardiology;  Laterality: N/A;       Review of patient's allergies indicates:  No Known Allergies    No current facility-administered medications on file prior to encounter.     Current Outpatient  Medications on File Prior to Encounter   Medication Sig    albuterol (PROVENTIL/VENTOLIN HFA) 90 mcg/actuation inhaler INHALE 2 PUFFS BY MOUTH FOUR TIMES A DAY AS NEEDED FOR BRONCHOSPASM PREVENTION    amitriptyline (ELAVIL) 50 MG tablet Take 50 mg by mouth every evening.    amLODIPine (NORVASC) 10 MG tablet Take 1 tablet (10 mg total) by mouth once daily.    apixaban (ELIQUIS) 5 mg Tab Take 1 tablet (5 mg total) by mouth 2 (two) times daily.    aspirin (ECOTRIN) 81 MG EC tablet Take 81 mg by mouth once daily.    chlorhexidine (HIBICLENS) 4 % external liquid APPLY SMALL AMOUNT TOPICALLY EVERY DAY - USE TO WASH HAIR/SCALP EVERY DAY - THOROUGHLY RINSE THE AREA TO BE CLEANED WITH WATER. APPLY THE MINIMUM AMOUNT OF PRODUCT NECESSARY TO COVER THE SKIN  AND WASH GENTLY.RINSE AGAIN THOROUGHLY    citalopram (CELEXA) 40 MG tablet Take 40 mg by mouth once daily.    clobetasoL (TEMOVATE) 0.05 % external solution APPLY MODERATE AMOUNT TOPICALLY TWICE A DAY AS NEEDED TO THE SCALP    EScitalopram oxalate (LEXAPRO) 20 MG tablet Take 20 mg by mouth once daily.    fish oil-omega-3 fatty acids 300-1,000 mg capsule Take 1 g by mouth once daily.    flecainide (TAMBOCOR) 100 MG Tab Take 1 tablet (100 mg total) by mouth every 12 (twelve) hours.    furosemide (LASIX) 80 MG tablet Take 1 tablet (80 mg total) by mouth 2 (two) times daily.    gabapentin (NEURONTIN) 600 MG tablet Take 600 mg by mouth.    loratadine (CLARITIN) 10 mg tablet Take 10 mg by mouth once daily.    losartan (COZAAR) 100 MG tablet Take 1 tablet (100 mg total) by mouth once daily.    methocarbamol (ROBAXIN) 750 MG Tab Take 500 mg by mouth 3 (three) times daily.    metoprolol succinate (TOPROL-XL) 25 MG 24 hr tablet Take 0.5 tablets (12.5 mg total) by mouth once daily.    mirtazapine (REMERON) 30 MG tablet Take 30 mg by mouth every evening.    multivitamin capsule Take 1 capsule by mouth once daily.    naproxen (NAPROSYN) 500 MG tablet Take 500 mg by mouth 2 (two) times  daily with meals.    omeprazole (PRILOSEC) 20 MG capsule Take 20 mg by mouth once daily.    pravastatin (PRAVACHOL) 80 MG tablet Take 1 tablet (80 mg total) by mouth once daily.    prazosin (MINIPRESS) 2 MG Cap Take 2 mg by mouth once daily.    prazosin (MINIPRESS) 5 MG capsule Take 5 mg by mouth 2 (two) times daily.    QUEtiapine (SEROQUEL) 50 MG tablet Take 50 mg by mouth nightly. One-half tablet    traZODone (DESYREL) 50 MG tablet Take 50 mg by mouth every evening.    zolpidem (AMBIEN) 5 MG Tab Take 1 tablet (5 mg total) by mouth nightly as needed (INSOMNIA). (Patient taking differently: Take 10 mg by mouth nightly as needed (INSOMNIA).)     Family History       Problem Relation (Age of Onset)    COPD Mother    Depression Brother          Tobacco Use    Smoking status: Former     Current packs/day: 0.00     Average packs/day: 1.5 packs/day for 10.0 years (15.0 ttl pk-yrs)     Types: Cigarettes     Start date: 10/30/1972     Quit date: 10/30/1982     Years since quittin.0    Smokeless tobacco: Not on file   Substance and Sexual Activity    Alcohol use: No    Drug use: No    Sexual activity: Not on file     Review of Systems   All other systems reviewed and are negative.    Objective:     Vital Signs (Most Recent):    Vital Signs (24h Range):  BP: ()/()   Arterial Line BP: ()/()           There is no height or weight on file to calculate BMI.             Physical Exam  Vitals and nursing note reviewed.   Constitutional:       Appearance: Normal appearance.   Cardiovascular:      Rate and Rhythm: Normal rate and regular rhythm.      Pulses: Normal pulses.      Heart sounds: Normal heart sounds.   Pulmonary:      Effort: Pulmonary effort is normal.      Breath sounds: Normal breath sounds.   Musculoskeletal:      Right lower leg: No edema.      Left lower leg: No edema.   Skin:     General: Skin is warm.   Neurological:      General: No focal deficit present.      Mental Status: He is alert.             Significant Labs: All pertinent lab results from the last 24 hours have been reviewed.

## 2023-10-25 NOTE — HPI
Lucas Garrison is a 72 y.o. male who presents for Atrial Fibrillation management.     He developed symptomatic persistent AF leading to NATIVIDAD/CV twice in August 2023. He had NATIVIDAD/CV 4/21. NSR 5/21, AF noted 6/23. EF normal. CVA prophylaxis with xarelto. He has been on amiodarone in the past (4-6/21). He was placed on flecainide a few months ago and required increased dose due to breakthrough AF event which has resulted in symptomatic bradycardia.      NATIVIDAD 8/23:  ·           The left ventricle is normal in size with normal systolic function.  ·           Normal right ventricular size with normal right ventricular systolic function.  ·           Normal central venous pressure (3 mmHg).  ·           Atrial fibrillation observed.  ·           Normal appearing left atrial appendage. No thrombus is present in the appendage.  ·           Mild mitral regurgitation.  ·           Mild tricuspid regurgitation.  ·           The estimated ejection fraction is 60%.  ·           A 200 J synchronized cardioversion was successfully performed with restoration of normal sinus rhythm.    ECG: AF

## 2023-10-25 NOTE — ASSESSMENT & PLAN NOTE
72 yoM here for AF management. He has symptomatic, persistent AF refractory to AAD therapy and cardioversion as well as an intolerance to increased medical therapy. I had extensive discussion with patient regarding risks and benefits of PVI/WACA, and the patient would like to proceed. Risks of procedure include (but are not limited to) bleeding, stroke, perforation requiring emergency draining or surgery, AV block, death, AV fistula, AE fistula, PV stenosis.     Last anti-coagulation dose night prior to procedure.  Discontinue antiarrhytmic drugs 4 days prior to the procedure.      RF PVI  Anesthesia  NATIVIDAD prior, cancel if NSR  CHELSI      stay overnighT

## 2023-10-26 ENCOUNTER — ANESTHESIA (OUTPATIENT)
Dept: MEDSURG UNIT | Facility: HOSPITAL | Age: 73
End: 2023-10-26
Payer: OTHER GOVERNMENT

## 2023-10-26 ENCOUNTER — HOSPITAL ENCOUNTER (OUTPATIENT)
Facility: HOSPITAL | Age: 73
Discharge: HOME | End: 2023-10-27
Attending: INTERNAL MEDICINE | Admitting: INTERNAL MEDICINE
Payer: OTHER GOVERNMENT

## 2023-10-26 ENCOUNTER — HOSPITAL ENCOUNTER (OUTPATIENT)
Dept: CARDIOLOGY | Facility: HOSPITAL | Age: 73
Discharge: HOME | End: 2023-10-26
Attending: INTERNAL MEDICINE | Admitting: INTERNAL MEDICINE
Payer: OTHER GOVERNMENT

## 2023-10-26 ENCOUNTER — ANESTHESIA EVENT (OUTPATIENT)
Dept: MEDSURG UNIT | Facility: HOSPITAL | Age: 73
End: 2023-10-26
Payer: OTHER GOVERNMENT

## 2023-10-26 VITALS
DIASTOLIC BLOOD PRESSURE: 87 MMHG | BODY MASS INDEX: 39.2 KG/M2 | HEART RATE: 58 BPM | HEIGHT: 71 IN | SYSTOLIC BLOOD PRESSURE: 141 MMHG | WEIGHT: 280 LBS

## 2023-10-26 DIAGNOSIS — I49.9 ARRHYTHMIA: ICD-10-CM

## 2023-10-26 DIAGNOSIS — G47.00 INSOMNIA, UNSPECIFIED TYPE: ICD-10-CM

## 2023-10-26 DIAGNOSIS — I48.91 ATRIAL FIBRILLATION: ICD-10-CM

## 2023-10-26 DIAGNOSIS — I48.0 PAROXYSMAL ATRIAL FIBRILLATION: ICD-10-CM

## 2023-10-26 DIAGNOSIS — I48.91 AF (ATRIAL FIBRILLATION): ICD-10-CM

## 2023-10-26 LAB
ASCENDING AORTA: 3.2 CM
BSA FOR ECHO PROCEDURE: 2.52 M2
SINUS: 3.6 CM
STJ: 3 CM

## 2023-10-26 PROCEDURE — C1766 INTRO/SHEATH,STRBLE,NON-PEEL: HCPCS | Performed by: INTERNAL MEDICINE

## 2023-10-26 PROCEDURE — C1894 INTRO/SHEATH, NON-LASER: HCPCS | Performed by: INTERNAL MEDICINE

## 2023-10-26 PROCEDURE — 93320 TRANSESOPHAGEAL ECHO (TEE) (CUPID ONLY): ICD-10-PCS | Mod: 26,,, | Performed by: INTERNAL MEDICINE

## 2023-10-26 PROCEDURE — 25000003 PHARM REV CODE 250: Performed by: INTERNAL MEDICINE

## 2023-10-26 PROCEDURE — 27201423 OPTIME MED/SURG SUP & DEVICES STERILE SUPPLY: Performed by: INTERNAL MEDICINE

## 2023-10-26 PROCEDURE — 93312 TRANSESOPHAGEAL ECHO (TEE) (CUPID ONLY): ICD-10-PCS | Mod: 26,,, | Performed by: INTERNAL MEDICINE

## 2023-10-26 PROCEDURE — D9220A PRA ANESTHESIA: Mod: CRNA,,, | Performed by: NURSE ANESTHETIST, CERTIFIED REGISTERED

## 2023-10-26 PROCEDURE — 63600175 PHARM REV CODE 636 W HCPCS: Performed by: ANESTHESIOLOGY

## 2023-10-26 PROCEDURE — 93325 DOPPLER ECHO COLOR FLOW MAPG: CPT | Mod: 26,,, | Performed by: INTERNAL MEDICINE

## 2023-10-26 PROCEDURE — 63600175 PHARM REV CODE 636 W HCPCS: Performed by: STUDENT IN AN ORGANIZED HEALTH CARE EDUCATION/TRAINING PROGRAM

## 2023-10-26 PROCEDURE — 93656 COMPRE EP EVAL ABLTJ ATR FIB: CPT | Mod: ,,, | Performed by: INTERNAL MEDICINE

## 2023-10-26 PROCEDURE — 37000008 HC ANESTHESIA 1ST 15 MINUTES: Performed by: INTERNAL MEDICINE

## 2023-10-26 PROCEDURE — 93656 PR ELECTROPHYS EVAL, COMPREHEN, W/ABLATION OF ATRIAL FIBR: ICD-10-PCS | Mod: ,,, | Performed by: INTERNAL MEDICINE

## 2023-10-26 PROCEDURE — C2630 CATH, EP, COOL-TIP: HCPCS | Performed by: INTERNAL MEDICINE

## 2023-10-26 PROCEDURE — D9220A PRA ANESTHESIA: ICD-10-PCS | Mod: ANES,,, | Performed by: ANESTHESIOLOGY

## 2023-10-26 PROCEDURE — 93325 DOPPLER ECHO COLOR FLOW MAPG: CPT

## 2023-10-26 PROCEDURE — 92960 CARDIOVERSION ELECTRIC EXT: CPT | Mod: 59,,, | Performed by: INTERNAL MEDICINE

## 2023-10-26 PROCEDURE — 63600175 PHARM REV CODE 636 W HCPCS: Performed by: NURSE ANESTHETIST, CERTIFIED REGISTERED

## 2023-10-26 PROCEDURE — 93312 ECHO TRANSESOPHAGEAL: CPT | Mod: 26,,, | Performed by: INTERNAL MEDICINE

## 2023-10-26 PROCEDURE — D9220A PRA ANESTHESIA: ICD-10-PCS | Mod: CRNA,,, | Performed by: NURSE ANESTHETIST, CERTIFIED REGISTERED

## 2023-10-26 PROCEDURE — 93325 TRANSESOPHAGEAL ECHO (TEE) (CUPID ONLY): ICD-10-PCS | Mod: 26,,, | Performed by: INTERNAL MEDICINE

## 2023-10-26 PROCEDURE — 92960 PR CARDIOVERSION, ELECTIVE;EXTERN: ICD-10-PCS | Mod: 59,,, | Performed by: INTERNAL MEDICINE

## 2023-10-26 PROCEDURE — 25000003 PHARM REV CODE 250: Performed by: STUDENT IN AN ORGANIZED HEALTH CARE EDUCATION/TRAINING PROGRAM

## 2023-10-26 PROCEDURE — 36620 INSERTION CATHETER ARTERY: CPT | Mod: 59,,, | Performed by: ANESTHESIOLOGY

## 2023-10-26 PROCEDURE — 93656 COMPRE EP EVAL ABLTJ ATR FIB: CPT | Performed by: INTERNAL MEDICINE

## 2023-10-26 PROCEDURE — 63600175 PHARM REV CODE 636 W HCPCS: Performed by: INTERNAL MEDICINE

## 2023-10-26 PROCEDURE — 93010 ELECTROCARDIOGRAM REPORT: CPT | Mod: ,,, | Performed by: INTERNAL MEDICINE

## 2023-10-26 PROCEDURE — 36620 ARTERIAL: ICD-10-PCS | Mod: 59,,, | Performed by: ANESTHESIOLOGY

## 2023-10-26 PROCEDURE — 25000003 PHARM REV CODE 250: Performed by: NURSE ANESTHETIST, CERTIFIED REGISTERED

## 2023-10-26 PROCEDURE — C1730 CATH, EP, 19 OR FEW ELECT: HCPCS | Performed by: INTERNAL MEDICINE

## 2023-10-26 PROCEDURE — 93010 EKG 12-LEAD: ICD-10-PCS | Mod: ,,, | Performed by: INTERNAL MEDICINE

## 2023-10-26 PROCEDURE — 93005 ELECTROCARDIOGRAM TRACING: CPT | Mod: 59

## 2023-10-26 PROCEDURE — 37000009 HC ANESTHESIA EA ADD 15 MINS: Performed by: INTERNAL MEDICINE

## 2023-10-26 PROCEDURE — 92960 CARDIOVERSION ELECTRIC EXT: CPT | Mod: 59 | Performed by: INTERNAL MEDICINE

## 2023-10-26 PROCEDURE — 93005 ELECTROCARDIOGRAM TRACING: CPT

## 2023-10-26 PROCEDURE — C1753 CATH, INTRAVAS ULTRASOUND: HCPCS | Performed by: INTERNAL MEDICINE

## 2023-10-26 PROCEDURE — 93320 DOPPLER ECHO COMPLETE: CPT | Mod: 26,,, | Performed by: INTERNAL MEDICINE

## 2023-10-26 PROCEDURE — D9220A PRA ANESTHESIA: Mod: ANES,,, | Performed by: ANESTHESIOLOGY

## 2023-10-26 RX ORDER — PHENYLEPHRINE HYDROCHLORIDE 10 MG/ML
INJECTION INTRAVENOUS
Status: DISCONTINUED | OUTPATIENT
Start: 2023-10-26 | End: 2023-10-26

## 2023-10-26 RX ORDER — PROTAMINE SULFATE 10 MG/ML
INJECTION, SOLUTION INTRAVENOUS
Status: DISCONTINUED | OUTPATIENT
Start: 2023-10-26 | End: 2023-10-26

## 2023-10-26 RX ORDER — PANTOPRAZOLE SODIUM 40 MG/1
40 TABLET, DELAYED RELEASE ORAL DAILY
Status: DISCONTINUED | OUTPATIENT
Start: 2023-10-27 | End: 2023-10-27 | Stop reason: HOSPADM

## 2023-10-26 RX ORDER — AMLODIPINE BESYLATE 10 MG/1
10 TABLET ORAL DAILY
Status: DISCONTINUED | OUTPATIENT
Start: 2023-10-27 | End: 2023-10-27 | Stop reason: HOSPADM

## 2023-10-26 RX ORDER — ONDANSETRON 2 MG/ML
4 INJECTION INTRAMUSCULAR; INTRAVENOUS ONCE AS NEEDED
Status: DISCONTINUED | OUTPATIENT
Start: 2023-10-26 | End: 2023-10-27 | Stop reason: HOSPADM

## 2023-10-26 RX ORDER — HEPARIN SODIUM 1000 [USP'U]/ML
INJECTION, SOLUTION INTRAVENOUS; SUBCUTANEOUS
Status: DISCONTINUED | OUTPATIENT
Start: 2023-10-26 | End: 2023-10-26

## 2023-10-26 RX ORDER — FUROSEMIDE 80 MG/1
80 TABLET ORAL 2 TIMES DAILY
Status: DISCONTINUED | OUTPATIENT
Start: 2023-10-27 | End: 2023-10-27 | Stop reason: HOSPADM

## 2023-10-26 RX ORDER — ASPIRIN 81 MG/1
81 TABLET ORAL DAILY
Status: DISCONTINUED | OUTPATIENT
Start: 2023-10-27 | End: 2023-10-27 | Stop reason: HOSPADM

## 2023-10-26 RX ORDER — FENTANYL CITRATE 50 UG/ML
INJECTION, SOLUTION INTRAMUSCULAR; INTRAVENOUS
Status: DISCONTINUED | OUTPATIENT
Start: 2023-10-26 | End: 2023-10-26

## 2023-10-26 RX ORDER — ACETAMINOPHEN 325 MG/1
650 TABLET ORAL EVERY 4 HOURS PRN
Status: DISCONTINUED | OUTPATIENT
Start: 2023-10-26 | End: 2023-10-27 | Stop reason: HOSPADM

## 2023-10-26 RX ORDER — FUROSEMIDE 10 MG/ML
40 INJECTION INTRAMUSCULAR; INTRAVENOUS ONCE
Status: COMPLETED | OUTPATIENT
Start: 2023-10-26 | End: 2023-10-26

## 2023-10-26 RX ORDER — HEPARIN SOD,PORCINE/0.9 % NACL 1000/500ML
INTRAVENOUS SOLUTION INTRAVENOUS
Status: DISCONTINUED | OUTPATIENT
Start: 2023-10-26 | End: 2023-10-27 | Stop reason: HOSPADM

## 2023-10-26 RX ORDER — LIDOCAINE HYDROCHLORIDE 20 MG/ML
INJECTION, SOLUTION EPIDURAL; INFILTRATION; INTRACAUDAL; PERINEURAL
Status: DISCONTINUED | OUTPATIENT
Start: 2023-10-26 | End: 2023-10-26

## 2023-10-26 RX ORDER — FUROSEMIDE 20 MG/1
80 TABLET ORAL 2 TIMES DAILY
Status: DISCONTINUED | OUTPATIENT
Start: 2023-10-26 | End: 2023-10-26

## 2023-10-26 RX ORDER — PROPOFOL 10 MG/ML
VIAL (ML) INTRAVENOUS
Status: DISCONTINUED | OUTPATIENT
Start: 2023-10-26 | End: 2023-10-26

## 2023-10-26 RX ORDER — HEPARIN SODIUM 10000 [USP'U]/100ML
INJECTION, SOLUTION INTRAVENOUS CONTINUOUS PRN
Status: DISCONTINUED | OUTPATIENT
Start: 2023-10-26 | End: 2023-10-26

## 2023-10-26 RX ORDER — MIDAZOLAM HYDROCHLORIDE 1 MG/ML
INJECTION, SOLUTION INTRAMUSCULAR; INTRAVENOUS
Status: DISCONTINUED | OUTPATIENT
Start: 2023-10-26 | End: 2023-10-26

## 2023-10-26 RX ORDER — TALC
10 POWDER (GRAM) TOPICAL ONCE
Status: COMPLETED | OUTPATIENT
Start: 2023-10-26 | End: 2023-10-26

## 2023-10-26 RX ORDER — FENTANYL CITRATE 50 UG/ML
25 INJECTION, SOLUTION INTRAMUSCULAR; INTRAVENOUS EVERY 5 MIN PRN
Status: DISCONTINUED | OUTPATIENT
Start: 2023-10-26 | End: 2023-10-26

## 2023-10-26 RX ORDER — LIDOCAINE HYDROCHLORIDE 20 MG/ML
INJECTION, SOLUTION INFILTRATION; PERINEURAL
Status: DISCONTINUED | OUTPATIENT
Start: 2023-10-26 | End: 2023-10-27 | Stop reason: HOSPADM

## 2023-10-26 RX ORDER — DEXAMETHASONE SODIUM PHOSPHATE 4 MG/ML
INJECTION, SOLUTION INTRA-ARTICULAR; INTRALESIONAL; INTRAMUSCULAR; INTRAVENOUS; SOFT TISSUE
Status: DISCONTINUED | OUTPATIENT
Start: 2023-10-26 | End: 2023-10-26

## 2023-10-26 RX ORDER — EPHEDRINE SULFATE 50 MG/ML
INJECTION, SOLUTION INTRAVENOUS
Status: DISCONTINUED | OUTPATIENT
Start: 2023-10-26 | End: 2023-10-26

## 2023-10-26 RX ORDER — DIPHENHYDRAMINE HYDROCHLORIDE 50 MG/ML
25 INJECTION INTRAMUSCULAR; INTRAVENOUS EVERY 6 HOURS PRN
Status: DISCONTINUED | OUTPATIENT
Start: 2023-10-26 | End: 2023-10-27 | Stop reason: HOSPADM

## 2023-10-26 RX ORDER — ROCURONIUM BROMIDE 10 MG/ML
INJECTION, SOLUTION INTRAVENOUS
Status: DISCONTINUED | OUTPATIENT
Start: 2023-10-26 | End: 2023-10-26

## 2023-10-26 RX ORDER — HYDROMORPHONE HYDROCHLORIDE 1 MG/ML
0.2 INJECTION, SOLUTION INTRAMUSCULAR; INTRAVENOUS; SUBCUTANEOUS EVERY 5 MIN PRN
Status: DISCONTINUED | OUTPATIENT
Start: 2023-10-26 | End: 2023-10-27 | Stop reason: HOSPADM

## 2023-10-26 RX ORDER — PRAVASTATIN SODIUM 80 MG/1
80 TABLET ORAL DAILY
Status: DISCONTINUED | OUTPATIENT
Start: 2023-10-27 | End: 2023-10-27 | Stop reason: HOSPADM

## 2023-10-26 RX ORDER — MIRTAZAPINE 15 MG/1
30 TABLET, FILM COATED ORAL NIGHTLY
Status: DISCONTINUED | OUTPATIENT
Start: 2023-10-26 | End: 2023-10-27 | Stop reason: HOSPADM

## 2023-10-26 RX ORDER — ONDANSETRON 2 MG/ML
INJECTION INTRAMUSCULAR; INTRAVENOUS
Status: DISCONTINUED | OUTPATIENT
Start: 2023-10-26 | End: 2023-10-26

## 2023-10-26 RX ADMIN — SUGAMMADEX 400 MG: 100 INJECTION, SOLUTION INTRAVENOUS at 04:10

## 2023-10-26 RX ADMIN — PHENYLEPHRINE HYDROCHLORIDE 40 MCG: 10 INJECTION INTRAVENOUS at 01:10

## 2023-10-26 RX ADMIN — ACETAMINOPHEN 650 MG: 325 TABLET ORAL at 09:10

## 2023-10-26 RX ADMIN — HEPARIN SODIUM 3000 UNITS: 1000 INJECTION, SOLUTION INTRAVENOUS; SUBCUTANEOUS at 03:10

## 2023-10-26 RX ADMIN — Medication 9 MG: at 09:10

## 2023-10-26 RX ADMIN — MIDAZOLAM HYDROCHLORIDE 2 MG: 1 INJECTION, SOLUTION INTRAMUSCULAR; INTRAVENOUS at 12:10

## 2023-10-26 RX ADMIN — HEPARIN SODIUM AND DEXTROSE 12 UNITS/KG/HR: 10000; 5 INJECTION INTRAVENOUS at 02:10

## 2023-10-26 RX ADMIN — LIDOCAINE HYDROCHLORIDE 100 MG: 20 INJECTION, SOLUTION EPIDURAL; INFILTRATION; INTRACAUDAL; PERINEURAL at 12:10

## 2023-10-26 RX ADMIN — APIXABAN 5 MG: 5 TABLET, FILM COATED ORAL at 08:10

## 2023-10-26 RX ADMIN — HEPARIN SODIUM 6000 UNITS: 1000 INJECTION, SOLUTION INTRAVENOUS; SUBCUTANEOUS at 02:10

## 2023-10-26 RX ADMIN — ROCURONIUM BROMIDE 50 MG: 10 INJECTION INTRAVENOUS at 02:10

## 2023-10-26 RX ADMIN — ROCURONIUM BROMIDE 20 MG: 10 INJECTION INTRAVENOUS at 03:10

## 2023-10-26 RX ADMIN — PHENYLEPHRINE HYDROCHLORIDE 80 MCG: 10 INJECTION INTRAVENOUS at 02:10

## 2023-10-26 RX ADMIN — PROPOFOL 200 MG: 10 INJECTION, EMULSION INTRAVENOUS at 12:10

## 2023-10-26 RX ADMIN — SODIUM CHLORIDE, SODIUM ACETATE ANHYDROUS, SODIUM GLUCONATE, POTASSIUM CHLORIDE, AND MAGNESIUM CHLORIDE: 526; 222; 502; 37; 30 INJECTION, SOLUTION INTRAVENOUS at 12:10

## 2023-10-26 RX ADMIN — FUROSEMIDE 40 MG: 10 INJECTION, SOLUTION INTRAVENOUS at 09:10

## 2023-10-26 RX ADMIN — DEXAMETHASONE SODIUM PHOSPHATE 4 MG: 4 INJECTION, SOLUTION INTRAMUSCULAR; INTRAVENOUS at 01:10

## 2023-10-26 RX ADMIN — ROCURONIUM BROMIDE 100 MG: 10 INJECTION INTRAVENOUS at 12:10

## 2023-10-26 RX ADMIN — PROTAMINE SULFATE 70 MG: 10 INJECTION, SOLUTION INTRAVENOUS at 03:10

## 2023-10-26 RX ADMIN — HYDROMORPHONE HYDROCHLORIDE 0.2 MG: 1 INJECTION, SOLUTION INTRAMUSCULAR; INTRAVENOUS; SUBCUTANEOUS at 05:10

## 2023-10-26 RX ADMIN — ONDANSETRON 4 MG: 2 INJECTION INTRAMUSCULAR; INTRAVENOUS at 03:10

## 2023-10-26 RX ADMIN — EPHEDRINE SULFATE 5 MG: 50 INJECTION INTRAVENOUS at 02:10

## 2023-10-26 RX ADMIN — MIRTAZAPINE 30 MG: 15 TABLET, FILM COATED ORAL at 08:10

## 2023-10-26 RX ADMIN — EPHEDRINE SULFATE 5 MG: 50 INJECTION INTRAVENOUS at 03:10

## 2023-10-26 RX ADMIN — FENTANYL CITRATE 100 MCG: 50 INJECTION, SOLUTION INTRAMUSCULAR; INTRAVENOUS at 12:10

## 2023-10-26 RX ADMIN — SODIUM CHLORIDE 0.5 MCG/KG/MIN: 9 INJECTION, SOLUTION INTRAVENOUS at 01:10

## 2023-10-26 RX ADMIN — HEPARIN SODIUM 3000 UNITS: 1000 INJECTION, SOLUTION INTRAVENOUS; SUBCUTANEOUS at 02:10

## 2023-10-26 RX ADMIN — ACETAMINOPHEN 650 MG: 325 TABLET ORAL at 05:10

## 2023-10-26 RX ADMIN — HEPARIN SODIUM 14000 UNITS: 1000 INJECTION, SOLUTION INTRAVENOUS; SUBCUTANEOUS at 02:10

## 2023-10-26 NOTE — TRANSFER OF CARE
"Anesthesia Transfer of Care Note    Patient: Lucas Garrison    Procedure(s) Performed: Procedure(s) (LRB):  Ablation atrial fibrillation (N/A)  Transesophageal echo (NATIVIDAD) intra-procedure log documentation (N/A)  Cardioversion or Defibrillation    Patient location: PACU    Anesthesia Type: general    Transport from OR: Transported from OR on 6-10 L/min O2 by face mask with adequate spontaneous ventilation    Post pain: adequate analgesia    Post assessment: no apparent anesthetic complications and tolerated procedure well    Post vital signs: stable    Level of consciousness: awake, alert and oriented    Nausea/Vomiting: no nausea/vomiting    Complications: none    Transfer of care protocol was followed      Last vitals:   Visit Vitals  BP (!) 141/87 (BP Location: Left arm, Patient Position: Lying)   Pulse 70   Temp 37 °C (98.6 °F) (Temporal)   Resp 16   Ht 5' 11" (1.803 m)   Wt 127 kg (280 lb)   SpO2 96%   BMI 39.05 kg/m²     "

## 2023-10-26 NOTE — CONSULTS
[facility], [facility]  NATIVIDAD Consult      Lucas Garrison  YOB: 1950  Medical Record Number:  [id number]  Attending Physician:  Jacobo Marquez MD   Date of Admission: 10/26/2023       Hospital Day:  0  Current Principal Problem:  Atrial fibrillation      History     Cc: Atrial fibrillation    HPI  Lucas Garrison is a 72 y.o. male who presents for evaluation of Atrial Fibrillation        72 yoM here for arrhythmia management. He developed symptomatic persistent AF leading to NATIVIDAD/CV twice in August 2023. He had NATIVIDAD/CV 4/21. NSR 5/21, AF noted 6/23. EF normal. CVA prophylaxis with xarelto. He has been on amiodarone in the past (4-6/21). He was placed on flecainide a few months ago and required increased dose due to breakthrough AF event which has resulted in symptomatic bradycardia.        Today, he feels well and has no complaints.      Anticoagulant/antiplatelets: eliquis   ECG: atrial fibrillation     Dysphagia or odynophagia:  no  Liver Disease, esophageal disease, or known varices:  no  Upper GI Bleeding: no  Snoring:  no  Sleep Apnea:  no  Prior neck surgery or radiation:  no  History of anesthetic difficulties:  no  Family history of anesthetic difficulties:  no  Last oral intake: last pm   Able to move neck in all directions: yes  Platelet count: wnl  INR: wnl        Medications - Outpatient  Prior to Admission medications    Medication Sig Start Date End Date Taking? Authorizing Provider   amitriptyline (ELAVIL) 50 MG tablet Take 50 mg by mouth every evening.   Yes Provider, Historical   amLODIPine (NORVASC) 10 MG tablet Take 1 tablet (10 mg total) by mouth once daily. 6/15/23  Yes Baldemar Davalos MD   apixaban (ELIQUIS) 5 mg Tab Take 1 tablet (5 mg total) by mouth 2 (two) times daily. 6/15/23  Yes Baldemar Davalos MD   aspirin (ECOTRIN) 81 MG EC tablet Take 81 mg by mouth once daily.   Yes Provider, Historical   chlorhexidine (HIBICLENS) 4 % external liquid APPLY SMALL  AMOUNT TOPICALLY EVERY DAY - USE TO WASH HAIR/SCALP EVERY DAY - THOROUGHLY RINSE THE AREA TO BE CLEANED WITH WATER. APPLY THE MINIMUM AMOUNT OF PRODUCT NECESSARY TO COVER THE SKIN  AND WASH GENTLY.RINSE AGAIN THOROUGHLY 10/19/22  Yes Provider, Historical   citalopram (CELEXA) 40 MG tablet Take 40 mg by mouth once daily.   Yes Provider, Historical   clobetasoL (TEMOVATE) 0.05 % external solution APPLY MODERATE AMOUNT TOPICALLY TWICE A DAY AS NEEDED TO THE SCALP 10/19/22  Yes Provider, Historical   fish oil-omega-3 fatty acids 300-1,000 mg capsule Take 1 g by mouth once daily.   Yes Provider, Historical   flecainide (TAMBOCOR) 100 MG Tab Take 1 tablet (100 mg total) by mouth every 12 (twelve) hours. 8/31/23 8/30/24 Yes Baldemar Davalos MD   furosemide (LASIX) 80 MG tablet Take 1 tablet (80 mg total) by mouth 2 (two) times daily. 7/27/23 7/26/24 Yes Baldemar Davalos MD   gabapentin (NEURONTIN) 600 MG tablet Take 600 mg by mouth.   Yes Provider, Historical   losartan (COZAAR) 100 MG tablet Take 1 tablet (100 mg total) by mouth once daily. 6/30/22  Yes Baldemar Davalos MD   metoprolol succinate (TOPROL-XL) 25 MG 24 hr tablet Take 0.5 tablets (12.5 mg total) by mouth once daily. 8/31/23 8/30/24 Yes Baldemar Davalos MD   mirtazapine (REMERON) 30 MG tablet Take 30 mg by mouth every evening.   Yes Provider, Historical   multivitamin capsule Take 1 capsule by mouth once daily.   Yes Provider, Historical   naproxen (NAPROSYN) 500 MG tablet Take 500 mg by mouth 2 (two) times daily with meals.   Yes Provider, Historical   omeprazole (PRILOSEC) 20 MG capsule Take 20 mg by mouth once daily.   Yes Provider, Historical   pravastatin (PRAVACHOL) 80 MG tablet Take 1 tablet (80 mg total) by mouth once daily. 6/15/23  Yes Baldemar Davalos MD   prazosin (MINIPRESS) 2 MG Cap Take 2 mg by mouth once daily.   Yes Provider, Historical   prazosin (MINIPRESS) 5 MG capsule Take 5 mg by mouth 2 (two) times daily.   Yes Provider,  Historical   QUEtiapine (SEROQUEL) 50 MG tablet Take 50 mg by mouth nightly. One-half tablet   Yes Provider, Historical   zolpidem (AMBIEN) 5 MG Tab Take 1 tablet (5 mg total) by mouth nightly as needed (INSOMNIA).  Patient taking differently: Take 10 mg by mouth nightly as needed (INSOMNIA). 6/10/21 10/26/23 Yes Baldemar Davalos MD   albuterol (PROVENTIL/VENTOLIN HFA) 90 mcg/actuation inhaler INHALE 2 PUFFS BY MOUTH FOUR TIMES A DAY AS NEEDED FOR BRONCHOSPASM PREVENTION 6/12/23   Provider, Historical   EScitalopram oxalate (LEXAPRO) 20 MG tablet Take 20 mg by mouth once daily.    Provider, Historical   loratadine (CLARITIN) 10 mg tablet Take 10 mg by mouth once daily.    Provider, Historical   methocarbamol (ROBAXIN) 750 MG Tab Take 500 mg by mouth 3 (three) times daily.    Provider, Historical   traZODone (DESYREL) 50 MG tablet Take 50 mg by mouth every evening.    Provider, Historical         Medications - Current  Scheduled Meds:  Continuous Infusions:  PRN Meds:.      Allergies  Review of patient's allergies indicates:  No Known Allergies      Past Medical History  Past Medical History:   Diagnosis Date    atrial fibrillation     Cancer     DDD (degenerative disc disease)     H/O prostate cancer     HLD (hyperlipidemia)     Hypertension     Lumbago     REGINA (obstructive sleep apnea)     Peripheral neuropathy     PTSD (post-traumatic stress disorder)          Past Surgical History  Past Surgical History:   Procedure Laterality Date    CATHETERIZATION OF BOTH LEFT AND RIGHT HEART N/A 6/4/2021    Procedure: CATHETERIZATION, HEART, BOTH LEFT AND RIGHT;  Surgeon: Baldemar Davalos MD;  Location: [facility] CATH LAB;  Service: Cardiology;  Laterality: N/A;    COLONOSCOPY N/A 12/7/2016    Procedure: COLONOSCOPY;  Surgeon: Zeus Guerrier MD;  Location: [facility] ENDO;  Service: Endoscopy;  Laterality: N/A;    COLONOSCOPY N/A 2/22/2021    Procedure: COLONOSCOPY;  Surgeon: Joselyn Castanon MD;  Location: [facility] ENDO;   Service: Endoscopy;  Laterality: N/A;    CORONARY ANGIOGRAPHY N/A 2021    Procedure: ANGIOGRAM, CORONARY ARTERY;  Surgeon: Baldemar Davalos MD;  Location: [facility] CATH LAB;  Service: Cardiology;  Laterality: N/A;    prostate radiation seeds      RIGHT HEART CATHETERIZATION Right 2021    Procedure: INSERTION, CATHETER, RIGHT HEART;  Surgeon: Baldemar Davalos MD;  Location: [facility] CATH LAB;  Service: Cardiology;  Laterality: Right;    TONSILLECTOMY      TRANSESOPHAGEAL ECHOCARDIOGRAM WITH POSSIBLE CARDIOVERSION (NATIVIDAD W/ POSS CARDIOVERSION) N/A 2023    Procedure: Transesophageal echo (NATIVIDAD) intra-procedure log documentation;  Surgeon: Baldemar Davalos MD;  Location: [facility] CATH LAB;  Service: Cardiology;  Laterality: N/A;    TRANSURETHRAL RESECTION OF PROSTATE      TREATMENT OF CARDIAC ARRHYTHMIA N/A 8/3/2023    Procedure: Cardioversion or Defibrillation;  Surgeon: Baldemar Davalos MD;  Location: [facility] CATH LAB;  Service: Cardiology;  Laterality: N/A;         Social History  Social History     Socioeconomic History    Marital status:    Tobacco Use    Smoking status: Former     Current packs/day: 0.00     Average packs/day: 1.5 packs/day for 10.0 years (15.0 ttl pk-yrs)     Types: Cigarettes     Start date: 10/30/1972     Quit date: 10/30/1982     Years since quittin.0    Smokeless tobacco: Never   Substance and Sexual Activity    Alcohol use: No    Drug use: No         ROS  10 point ROS performed and negative except as stated in HPI     Physical Examination         Vital Signs  Vitals  Temp: 98.6 °F (37 °C)  Temp Source: Temporal  Pulse: 70  Heart Rate Source: Monitor  Resp: 16  SpO2: 96 %  BP: (!) 141/87  MAP (mmHg): 108  BP Location: Left arm  BP Method: Automatic  Patient Position: Lying          24 Hour VS Range    Temp:  [98.6 °F (37 °C)]   Pulse:  [70]   Resp:  [16]   BP: (141-159)/(87-95)   SpO2:  [96 %]   No intake or output data in the 24 hours ending 10/26/23 1034      Physical Exam:  "  Constitutional: no acute distress  HEENT: NCAT, EOMI, no scleral icterus  Cardiovascular: Regular rate and rhythm  Pulmonary: Normal respiratory effort   Abdomen: nontender, non-distended   Neuro: alert and oriented, no focal deficits  Extremities: warm, no edema   MSK: no deformities  Integument: intact, no rashes           Data       Recent Labs   Lab 10/19/23  1119   WBC 5.50   HGB 15.7   HCT 47.0           Recent Labs   Lab 10/19/23  1119   INR 1.1        Recent Labs   Lab 10/19/23  1119      K 4.6      CO2 25   BUN 16   CREATININE 1.5*   ANIONGAP 13   CALCIUM 9.6        No results for input(s): "PROT", "ALBUMIN", "BILITOT", "ALKPHOS", "AST", "ALT" in the last 168 hours.     No results for input(s): "TROPONINI" in the last 168 hours.     BNP   Date Value   06/15/2023 149 pg/mL (H)   07/24/2021 127 PG/ML       No results for input(s): "LABBLOO" in the last 168 hours.         Assessment & Plan     Atrial fibrillation  Proceed with NATIVIDAD prior to ablation           -No absolute contraindications of esophageal stricture, tumor, perforation, laceration,or diverticulum and/or active GI bleed  -The risks, benefits & alternatives of the procedure were explained to the patient.   -The risks of transesophageal echo include but are not limited to:  Dental trauma, esophageal trauma/perforation, bleeding, laryngospasm/brochospasm, aspiration, sore throat/hoarseness, & dislodgement of the endotracheal tube/nasogastric tube (where applicable).    -The risks of moderate sedation include hypotension, respiratory depression, arrhythmias, bronchospasm, & death.    -Informed consent was obtained. The patient is agreeable to proceed with the procedure and all questions and concerns addressed    Ayesha Palmer MD  [facility]   Cardiovascular Disease PGY-V    "

## 2023-10-26 NOTE — NURSING TRANSFER
Nursing Transfer Note      10/26/2023   6:47 PM    Nurse giving handoff: WILLAM Santo  Nurse receiving handoff: Kym-CSU    Reason patient is being transferred: post procedure    Transfer To: 317    Transfer via stretcher    Transfer with cardiac monitoring    Transported by RN x2    Transfer Vital Signs:  Please see flow sheet    Telemetry: Box number: [id number]  Order for Tele Monitor? Yes    Additional Lines: condom catheter    4eyes on Skin: yes    Medicines sent: silver sulfadiazine    Any special needs or follow-up needed: frequent checks every 30 mins, sutures to remove at 2000, HOB to 30 degrees at 2000, Bed rest complete at 2200    Patient belongings transferred with patient:  N/A    Chart send with patient: Yes    Notified: spouse    Patient reassessed at: 10/26/2023 at 1800   1  Upon arrival to floor: cardiac monitor applied, patient oriented to room, and bed in lowest position

## 2023-10-26 NOTE — NURSING
Pt admitted to room 317. Placed on tele.VSS. Bilateral groin sites clean, dry,intact. Pulses palpable +2.Will continue to monitor.

## 2023-10-26 NOTE — BRIEF OP NOTE
Attending: Jacobo Marquez MD  Date of Procedure: 10/26/23    Post-operative Diagnosis: AF    Procedure Performed: Pulmonary vein isolation of all 4 pulmonary veins via radiofrequency ablation.     Description of Procedure: The patient was brought to the EP lab in the fasting state. Prepped and draped in sterile fashion. Safety timeout was performed. Sedation administered by anesthesia staff. Ultrasound guided venous access of the bilateral femoral veins was performed. ICE and CS catheters placed via left femoral vein access. Long sheaths via right femoral venous access. Heparin bolus and continuous infusion per protocol. Two transseptal punctures performed using combination of fluoroscopic and ICE guidance. Map created. RFA to isolate all pulmonary veins. ICE confirmed no significant pericardial effusion.     EBL: <10 mL    Specimens: none  Complications: no immediate    Plan:  Bedrest x 6 hrs  Remove sutures at 4 hours post-procedure  Ambulation at 6 hours post-procedure if there is no evidence of access site complications  Close monitoring of hemodynamics, access site, and neurologic status  ECG upon arrival to PACU  Patient to resume OAC this evening. If bleeding or hematoma formation, please notify EP service before holding OAC  Medication changes: initiation of Protonix 40 mg daily x 30 days  Recommend ibuprofen 800 mg TID x 3 days for pericarditis post-procedure  Admit to observation overnight. Will be evaluated by EP in the AM.     Liseth Wise MD, PGY7  Electrophysiology

## 2023-10-26 NOTE — ANESTHESIA PREPROCEDURE EVALUATION
10/26/2023  Lucas Garrison is a 73 y.o., male.  Patient Active Problem List   Diagnosis    Hx of colonic polyps    Screen for colon cancer    Atrial fibrillation    Abnormal stress test           Pre-op Assessment    I have reviewed the Patient Summary Reports.       I have reviewed the Medications.     Review of Systems  Anesthesia Hx:  No problems with previous Anesthesia   Denies Personal Hx of Anesthesia complications.       Physical Exam    Airway:  No airway management difficulties anticipated  Dental:No active dental issues noted  Chest/Lungs:  Clear to auscultation    Heart:  Rate: Normal  Rhythm: Regular Rhythm  Sounds: Normal        Anesthesia Plan  Type of Anesthesia, risks & benefits discussed:    Anesthesia Type: Gen ETT  Intra-op Monitoring Plan: Art Line  Informed Consent: Informed consent signed with the Patient and all parties understand the risks and agree with anesthesia plan.  All questions answered.   ASA Score: 3  Anesthesia Plan Notes: Chart reviewed. Patient seen and examined. Anesthesia plan discussed and questions answered. E-consent signed. Nacho Thomas MD    Ready For Surgery From Anesthesia Perspective.     .

## 2023-10-26 NOTE — ANESTHESIA PROCEDURE NOTES
Arterial    Diagnosis: A-fib    Patient location during procedure: done in OR  Procedure start time: 10/26/2023 1:10 PM  Timeout: 10/26/2023 1:09 PM  Procedure end time: 10/26/2023 1:11 PM    Staffing  Authorizing Provider: Nacho Thomas MD  Performing Provider: Navneet Schmitt CRNA    Staffing  Performed by: Navneet Schmitt CRNA  Authorized by: Nacho Thomas MD    Anesthesiologist was present at the time of the procedure.    Preanesthetic Checklist  Completed: patient identified, IV checked, site marked, risks and benefits discussed, surgical consent, monitors and equipment checked, pre-op evaluation, timeout performed and anesthesia consent givenArterial  Skin Prep: chlorhexidine gluconate  Local Infiltration: none  Orientation: right  Location: radial    Catheter Size: 20 G  Catheter placement by Ultrasound guidance. Heme positive aspiration all ports.   Vessel Caliber: large, patent, compressibility normal  Vascular Doppler:  not done  Needle advanced into vessel with real time Ultrasound guidance.Insertion Attempts: 1  Assessment  Dressing: secured with tape and tegaderm  Patient: Tolerated well

## 2023-10-26 NOTE — DISCHARGE INSTRUCTIONS
"Medications:  Discontinue Flecainide  Make sure to continue taking your blood thinner apixiban (trade name: Eliquis) after your procedure as you would normally take  Take pantoprazole/omeprazole (trade name: Protonix) nightly for at least 30 days after your procedure. This is to protect your esophagus during the post-operative period.  If given a prescription of furosemide (trade name: Lasix), which is a diuretic (fluid pill), you can take it daily or twice daily as needed for fluid retention or shortness of breath following your ablation  You may experience chest discomfort (also known as "pericarditis") with deep breathes, coughing, and/or laying down which is typically normal following your procedure. If this occurs, you can take ibuprofen (Motrin) 800 mg every 8 hours for 2-3 days. If the chest pain is persistent or severe please visit the nearest emergency department.    Groin site management, precautions, and restrictions:  Remove the bandages over your groin area the morning after your procedure. You can shower after you remove these bandages. Keep the groin sites clean and dry. You do not need to apply ointments or bandages to the area.   If oozing from groin site occurs, apply pressure without letting up for 15 minutes and lay flat for 1 hour. If bleeding has resolved, you can continue to monitor. If the bleeding continues or there is significant swelling or pain in the groin area, please visit the nearest ER for evaluation and treatment. DO NOT STOP TAKING YOUR BLOOD THINNER UNLESS INSTRUCTED BY A PHYSICIAN.   Do not take baths or submerge your groin area or at least 1 week or when the puncture sites in your groin have completely healed  Do not lift anything over 10 lbs for the first week after your procedure, and avoid strenuous activity during this time to allow for the groin sites to heal. After 1 week, there are no activity restrictions.  Please contact the electrophysiology clinic or go to the ER if " you experience: severe chest pain, shortness of breath, bleeding or swelling of the groin sites, or any other concerns.

## 2023-10-26 NOTE — HOSPITAL COURSE
Patient underwent successful RF-PVI for treatment of atrial fibrillation. No evidence of intra- or post-procedure complications. Post-ablation ECG shows NSR, and no acute abnormalities.     EP medications at discharge:  Antiarrhythmics and/or AVN agents: discontinue Flecainide.   Continue home PPI x at least 30 days.   Patient was instructed to continue their oral anticoagulation as previously prescribed  Patient was instructed to take ibuprofen 800 mg TID x 3 days for pericarditis.     Groin access sites without hematoma or bleeding. Activity restrictions given to patient. Instructed to seek medical attention for shortness of breath, chest discomfort not alleviated with NSAIDs, bleeding/hematoma formation at the access sites, acute onset of neurologic symptoms, N/V, or hematemesis. At discharge the patient denied CP, SOB, access site bleeding/hematoma, or any other complaints or evidence of complications.    ----------------------------------------------------------------------------------------

## 2023-10-26 NOTE — H&P
[facility] - Short Stay Cardiac Unit  Cardiac Electrophysiology  History and Physical     Admission Date: 10/26/2023  Code Status: Prior   Attending Provider: Jacobo Marquez MD   Principal Problem:Atrial fibrillation    Subjective:     Chief Complaint:  AF     HPI:  Lucas Garrison is a 72 y.o. male who presents for Atrial Fibrillation management.     He developed symptomatic persistent AF leading to NATIVIDAD/CV twice in August 2023. He had NATIVIDAD/CV 4/21. NSR 5/21, AF noted 6/23. EF normal. CVA prophylaxis with xarelto. He has been on amiodarone in the past (4-6/21). He was placed on flecainide a few months ago and required increased dose due to breakthrough AF event which has resulted in symptomatic bradycardia.      NATIVIDAD 8/23:  ·           The left ventricle is normal in size with normal systolic function.  ·           Normal right ventricular size with normal right ventricular systolic function.  ·           Normal central venous pressure (3 mmHg).  ·           Atrial fibrillation observed.  ·           Normal appearing left atrial appendage. No thrombus is present in the appendage.  ·           Mild mitral regurgitation.  ·           Mild tricuspid regurgitation.  ·           The estimated ejection fraction is 60%.  ·           A 200 J synchronized cardioversion was successfully performed with restoration of normal sinus rhythm.    ECG: AF      Past Medical History:   Diagnosis Date    Cancer     DDD (degenerative disc disease)     H/O prostate cancer     HLD (hyperlipidemia)     Hypertension     Lumbago     REGINA (obstructive sleep apnea)     Peripheral neuropathy     PTSD (post-traumatic stress disorder)        Past Surgical History:   Procedure Laterality Date    CATHETERIZATION OF BOTH LEFT AND RIGHT HEART N/A 6/4/2021    Procedure: CATHETERIZATION, HEART, BOTH LEFT AND RIGHT;  Surgeon: Baldemar Davalos MD;  Location: [facility] CATH LAB;  Service: Cardiology;  Laterality: N/A;    COLONOSCOPY N/A 12/7/2016     Procedure: COLONOSCOPY;  Surgeon: Zeus Guerrier MD;  Location: [facility] ENDO;  Service: Endoscopy;  Laterality: N/A;    COLONOSCOPY N/A 2/22/2021    Procedure: COLONOSCOPY;  Surgeon: Joselyn Castanon MD;  Location: [facility] ENDO;  Service: Endoscopy;  Laterality: N/A;    CORONARY ANGIOGRAPHY N/A 6/4/2021    Procedure: ANGIOGRAM, CORONARY ARTERY;  Surgeon: Baldemar Davalos MD;  Location: [facility] CATH LAB;  Service: Cardiology;  Laterality: N/A;    prostate radiation seeds  2010    RIGHT HEART CATHETERIZATION Right 6/4/2021    Procedure: INSERTION, CATHETER, RIGHT HEART;  Surgeon: Baldemar Davalos MD;  Location: [facility] CATH LAB;  Service: Cardiology;  Laterality: Right;    TONSILLECTOMY      TRANSESOPHAGEAL ECHOCARDIOGRAM WITH POSSIBLE CARDIOVERSION (NATIVIDAD W/ POSS CARDIOVERSION) N/A 6/20/2023    Procedure: Transesophageal echo (NATIVIDAD) intra-procedure log documentation;  Surgeon: Baldemar Davalos MD;  Location: [facility] CATH LAB;  Service: Cardiology;  Laterality: N/A;    TRANSURETHRAL RESECTION OF PROSTATE      TREATMENT OF CARDIAC ARRHYTHMIA N/A 8/3/2023    Procedure: Cardioversion or Defibrillation;  Surgeon: Baldemar Davalos MD;  Location: [facility] CATH LAB;  Service: Cardiology;  Laterality: N/A;       Review of patient's allergies indicates:  No Known Allergies    No current facility-administered medications on file prior to encounter.     Current Outpatient Medications on File Prior to Encounter   Medication Sig    albuterol (PROVENTIL/VENTOLIN HFA) 90 mcg/actuation inhaler INHALE 2 PUFFS BY MOUTH FOUR TIMES A DAY AS NEEDED FOR BRONCHOSPASM PREVENTION    amitriptyline (ELAVIL) 50 MG tablet Take 50 mg by mouth every evening.    amLODIPine (NORVASC) 10 MG tablet Take 1 tablet (10 mg total) by mouth once daily.    apixaban (ELIQUIS) 5 mg Tab Take 1 tablet (5 mg total) by mouth 2 (two) times daily.    aspirin (ECOTRIN) 81 MG EC tablet Take 81 mg by mouth once daily.    chlorhexidine (HIBICLENS) 4 % external  liquid APPLY SMALL AMOUNT TOPICALLY EVERY DAY - USE TO WASH HAIR/SCALP EVERY DAY - THOROUGHLY RINSE THE AREA TO BE CLEANED WITH WATER. APPLY THE MINIMUM AMOUNT OF PRODUCT NECESSARY TO COVER THE SKIN  AND WASH GENTLY.RINSE AGAIN THOROUGHLY    citalopram (CELEXA) 40 MG tablet Take 40 mg by mouth once daily.    clobetasoL (TEMOVATE) 0.05 % external solution APPLY MODERATE AMOUNT TOPICALLY TWICE A DAY AS NEEDED TO THE SCALP    EScitalopram oxalate (LEXAPRO) 20 MG tablet Take 20 mg by mouth once daily.    fish oil-omega-3 fatty acids 300-1,000 mg capsule Take 1 g by mouth once daily.    flecainide (TAMBOCOR) 100 MG Tab Take 1 tablet (100 mg total) by mouth every 12 (twelve) hours.    furosemide (LASIX) 80 MG tablet Take 1 tablet (80 mg total) by mouth 2 (two) times daily.    gabapentin (NEURONTIN) 600 MG tablet Take 600 mg by mouth.    loratadine (CLARITIN) 10 mg tablet Take 10 mg by mouth once daily.    losartan (COZAAR) 100 MG tablet Take 1 tablet (100 mg total) by mouth once daily.    methocarbamol (ROBAXIN) 750 MG Tab Take 500 mg by mouth 3 (three) times daily.    metoprolol succinate (TOPROL-XL) 25 MG 24 hr tablet Take 0.5 tablets (12.5 mg total) by mouth once daily.    mirtazapine (REMERON) 30 MG tablet Take 30 mg by mouth every evening.    multivitamin capsule Take 1 capsule by mouth once daily.    naproxen (NAPROSYN) 500 MG tablet Take 500 mg by mouth 2 (two) times daily with meals.    omeprazole (PRILOSEC) 20 MG capsule Take 20 mg by mouth once daily.    pravastatin (PRAVACHOL) 80 MG tablet Take 1 tablet (80 mg total) by mouth once daily.    prazosin (MINIPRESS) 2 MG Cap Take 2 mg by mouth once daily.    prazosin (MINIPRESS) 5 MG capsule Take 5 mg by mouth 2 (two) times daily.    QUEtiapine (SEROQUEL) 50 MG tablet Take 50 mg by mouth nightly. One-half tablet    traZODone (DESYREL) 50 MG tablet Take 50 mg by mouth every evening.    zolpidem (AMBIEN) 5 MG Tab Take 1 tablet (5 mg total) by  mouth nightly as needed (INSOMNIA). (Patient taking differently: Take 10 mg by mouth nightly as needed (INSOMNIA).)     Family History       Problem Relation (Age of Onset)    COPD Mother    Depression Brother          Tobacco Use    Smoking status: Former     Current packs/day: 0.00     Average packs/day: 1.5 packs/day for 10.0 years (15.0 ttl pk-yrs)     Types: Cigarettes     Start date: 10/30/1972     Quit date: 10/30/1982     Years since quittin.0    Smokeless tobacco: Not on file   Substance and Sexual Activity    Alcohol use: No    Drug use: No    Sexual activity: Not on file     Review of Systems   All other systems reviewed and are negative.    Objective:     Vital Signs (Most Recent):    Vital Signs (24h Range):  BP: ()/()   Arterial Line BP: ()/()           There is no height or weight on file to calculate BMI.             Physical Exam  Vitals and nursing note reviewed.   Constitutional:       Appearance: Normal appearance.   Cardiovascular:      Rate and Rhythm: Normal rate and regular rhythm.      Pulses: Normal pulses.      Heart sounds: Normal heart sounds.   Pulmonary:      Effort: Pulmonary effort is normal.      Breath sounds: Normal breath sounds.   Musculoskeletal:      Right lower leg: No edema.      Left lower leg: No edema.   Skin:     General: Skin is warm.   Neurological:      General: No focal deficit present.      Mental Status: He is alert.            Significant Labs: All pertinent lab results from the last 24 hours have been reviewed.      Assessment and Plan:     * Atrial fibrillation  72 yoM here for AF management. He has symptomatic, persistent AF refractory to AAD therapy and cardioversion as well as an intolerance to increased medical therapy. I had extensive discussion with patient regarding risks and benefits of PVI/WACA, and the patient would like to proceed. Risks of procedure include (but are not limited to) bleeding, stroke, perforation requiring emergency draining  or surgery, AV block, death, AV fistula, AE fistula, PV stenosis.     Last anti-coagulation dose night prior to procedure.  Discontinue antiarrhytmic drugs 4 days prior to the procedure.      RF PVI  Anesthesia  NATIVIDAD prior, cancel if NSR  CHELSI      stay overnighT        Liseth Wise MD  Cardiac Electrophysiology  [facility] - Short Stay Cardiac Unit

## 2023-10-26 NOTE — ANESTHESIA PROCEDURE NOTES
Intubation    Date/Time: 10/26/2023 12:57 PM    Performed by: Navneet Schmitt CRNA  Authorized by: Nacho Thomas MD    Intubation:     Induction:  Intravenous    Intubated:  Postinduction    Mask Ventilation:  Easy with oral airway    Attempts:  1    Attempted By:  Student (JENISE Cazares, [facility])    Method of Intubation:  Video laryngoscopy and bougie    Blade:  Renee 3    Laryngeal View Grade: Grade I - full view of cords      Difficult Airway Encountered?: No      Complications:  None    Airway Device:  Oral endotracheal tube    Airway Device Size:  7.5    Style/Cuff Inflation:  Cuffed (inflated to minimal occlusive pressure)    Inflation Amount (mL):  7    Tube secured:  22    Secured at:  The teeth    Placement Verified By:  Capnometry    Complicating Factors:  None    Findings Post-Intubation:  BS equal bilateral and atraumatic/condition of teeth unchanged

## 2023-10-27 VITALS
OXYGEN SATURATION: 93 % | SYSTOLIC BLOOD PRESSURE: 131 MMHG | BODY MASS INDEX: 40.34 KG/M2 | HEART RATE: 64 BPM | HEIGHT: 71 IN | TEMPERATURE: 99 F | DIASTOLIC BLOOD PRESSURE: 73 MMHG | RESPIRATION RATE: 18 BRPM | WEIGHT: 288.13 LBS

## 2023-10-27 LAB
POC ACTIVATED CLOTTING TIME K: 143 SEC (ref 74–137)
POC ACTIVATED CLOTTING TIME K: 179 SEC (ref 74–137)
POC ACTIVATED CLOTTING TIME K: 281 SEC (ref 74–137)
POC ACTIVATED CLOTTING TIME K: 329 SEC (ref 74–137)
POC ACTIVATED CLOTTING TIME K: 335 SEC (ref 74–137)
POC ACTIVATED CLOTTING TIME K: 341 SEC (ref 74–137)
SAMPLE: ABNORMAL

## 2023-10-27 PROCEDURE — 25000003 PHARM REV CODE 250: Performed by: STUDENT IN AN ORGANIZED HEALTH CARE EDUCATION/TRAINING PROGRAM

## 2023-10-27 RX ORDER — ZOLPIDEM TARTRATE 5 MG/1
5 TABLET ORAL ONCE
Status: COMPLETED | OUTPATIENT
Start: 2023-10-27 | End: 2023-10-27

## 2023-10-27 RX ADMIN — ZOLPIDEM TARTRATE 5 MG: 5 TABLET ORAL at 05:10

## 2023-10-27 NOTE — NURSING
Patient requested for medication to help him sleep, MD on night notified, order for melatonin 9mg obtained at 2128 via telephone with read back.       At 0425, patient is still not able to sleep and is requesting for something to help him sleep. MD notified, order for Ambien 5mg obtained via telephone with verbal read back.       Right groin area still saturating with the new gauze, okayed by MD to place saline bag on top of R groin to hold pressure. Will continue to monitor.

## 2023-10-27 NOTE — DISCHARGE SUMMARY
[facility] - Cardiology Stepdown  Cardiac Electrophysiology  Discharge Summary      Patient Name: Lucas Garrison  MRN: [id number]  Admission Date: 10/26/2023  Hospital Length of Stay: 0 days  Discharge Date and Time:  10/27/2023 10:43 AM  Attending Physician: Jacobo Marquez MD    Discharging Provider: Liseth Wise MD  Primary Care Physician: [facility]    HPI:   Lucas Garrison is a 72 y.o. male who presents for Atrial Fibrillation management.     He developed symptomatic persistent AF leading to NATIVIDAD/CV twice in August 2023. He had NATIVIDAD/CV 4/21. NSR 5/21, AF noted 6/23. EF normal. CVA prophylaxis with xarelto. He has been on amiodarone in the past (4-6/21). He was placed on flecainide a few months ago and required increased dose due to breakthrough AF event which has resulted in symptomatic bradycardia.      NATIVIDAD 8/23:  ·           The left ventricle is normal in size with normal systolic function.  ·           Normal right ventricular size with normal right ventricular systolic function.  ·           Normal central venous pressure (3 mmHg).  ·           Atrial fibrillation observed.  ·           Normal appearing left atrial appendage. No thrombus is present in the appendage.  ·           Mild mitral regurgitation.  ·           Mild tricuspid regurgitation.  ·           The estimated ejection fraction is 60%.  ·           A 200 J synchronized cardioversion was successfully performed with restoration of normal sinus rhythm.    ECG: AF      Procedure(s) (LRB):  Ablation atrial fibrillation (N/A)  Transesophageal echo (NATIVIDAD) intra-procedure log documentation (N/A)  Cardioversion or Defibrillation     Indwelling Lines/Drains at time of discharge:  Lines/Drains/Airways       None                   Hospital Course:  Patient underwent successful RF-PVI for treatment of atrial fibrillation. No evidence of intra- or post-procedure complications. Post-ablation ECG shows NSR, and no acute  abnormalities.     EP medications at discharge:  Antiarrhythmics and/or AVN agents: discontinue Flecainide.   Continue home PPI x at least 30 days.   Patient was instructed to continue their oral anticoagulation as previously prescribed  Patient was instructed to take ibuprofen 800 mg TID x 3 days for pericarditis.     Groin access sites without hematoma or bleeding. Activity restrictions given to patient. Instructed to seek medical attention for shortness of breath, chest discomfort not alleviated with NSAIDs, bleeding/hematoma formation at the access sites, acute onset of neurologic symptoms, N/V, or hematemesis. At discharge the patient denied CP, SOB, access site bleeding/hematoma, or any other complaints or evidence of complications.    Patient was taking bisoprolol and metoprolol. Advised pt to discontinue metoprolol and can continue with bisoprolol. Also told to discontinue Flecainide.   ----------------------------------------------------------------------------------------      Goals of Care Treatment Preferences:  Code Status: Full Code      Consults:     Significant Diagnostic Studies: N/A    Pending Diagnostic Studies:       None            Final Active Diagnoses:    Diagnosis Date Noted POA    PRINCIPAL PROBLEM:  Atrial fibrillation [I48.91] 04/06/2021 Yes      Problems Resolved During this Admission:     No new Assessment & Plan notes have been filed under this hospital service since the last note was generated.  Service: Arrhythmia      Discharged Condition: stable    Disposition:     Follow Up:   Follow-up Information       Jacobo Marquez MD Follow up in 3 month(s).    Specialties: Electrophysiology, Cardiovascular Disease, Cardiology  Contact information:  [address] [facility] [id number] [phone]                           Patient Instructions:   No discharge procedures on file.  Medications:  Reconciled Home Medications:      Medication List        ASK your doctor about these  medications      albuterol 90 mcg/actuation inhaler  Commonly known as: PROVENTIL/VENTOLIN HFA  INHALE 2 PUFFS BY MOUTH FOUR TIMES A DAY AS NEEDED FOR BRONCHOSPASM PREVENTION     amitriptyline 50 MG tablet  Commonly known as: ELAVIL  Take 50 mg by mouth every evening.     amLODIPine 10 MG tablet  Commonly known as: NORVASC  Take 1 tablet (10 mg total) by mouth once daily.     apixaban 5 mg Tab  Commonly known as: ELIQUIS  Take 1 tablet (5 mg total) by mouth 2 (two) times daily.     aspirin 81 MG EC tablet  Commonly known as: ECOTRIN  Take 81 mg by mouth once daily.     chlorhexidine 4 % external liquid  Commonly known as: HIBICLENS  APPLY SMALL AMOUNT TOPICALLY EVERY DAY - USE TO WASH HAIR/SCALP EVERY DAY - THOROUGHLY RINSE THE AREA TO BE CLEANED WITH WATER. APPLY THE MINIMUM AMOUNT OF PRODUCT NECESSARY TO COVER THE SKIN  AND WASH GENTLY.RINSE AGAIN THOROUGHLY     citalopram 40 MG tablet  Commonly known as: CeleXA  Take 40 mg by mouth once daily.     clobetasoL 0.05 % external solution  Commonly known as: TEMOVATE  APPLY MODERATE AMOUNT TOPICALLY TWICE A DAY AS NEEDED TO THE SCALP     EScitalopram oxalate 20 MG tablet  Commonly known as: LEXAPRO  Take 20 mg by mouth once daily.     fish oil-omega-3 fatty acids 300-1,000 mg capsule  Take 1 g by mouth once daily.     furosemide 80 MG tablet  Commonly known as: LASIX  Take 1 tablet (80 mg total) by mouth 2 (two) times daily.     gabapentin 600 MG tablet  Commonly known as: NEURONTIN  Take 600 mg by mouth.     loratadine 10 mg tablet  Commonly known as: CLARITIN  Take 10 mg by mouth once daily.     losartan 100 MG tablet  Commonly known as: COZAAR  Take 1 tablet (100 mg total) by mouth once daily.     methocarbamoL 750 MG Tab  Commonly known as: ROBAXIN  Take 500 mg by mouth 3 (three) times daily.     metoprolol succinate 25 MG 24 hr tablet  Commonly known as: TOPROL-XL  Take 0.5 tablets (12.5 mg total) by mouth once daily.     mirtazapine 30 MG tablet  Commonly known  as: REMERON  Take 30 mg by mouth every evening.     multivitamin capsule  Take 1 capsule by mouth once daily.     naproxen 500 MG tablet  Commonly known as: NAPROSYN  Take 500 mg by mouth 2 (two) times daily with meals.     omeprazole 20 MG capsule  Commonly known as: PRILOSEC  Take 20 mg by mouth once daily.     pravastatin 80 MG tablet  Commonly known as: PRAVACHOL  Take 1 tablet (80 mg total) by mouth once daily.     * prazosin 2 MG Cap  Commonly known as: MINIPRESS  Take 2 mg by mouth once daily.     * prazosin 5 MG capsule  Commonly known as: MINIPRESS  Take 5 mg by mouth 2 (two) times daily.     QUEtiapine 50 MG tablet  Commonly known as: SEROQUEL  Take 50 mg by mouth nightly. One-half tablet     traZODone 50 MG tablet  Commonly known as: DESYREL  Take 50 mg by mouth every evening.     zolpidem 5 MG Tab  Commonly known as: AMBIEN  Take 1 tablet (5 mg total) by mouth nightly as needed (INSOMNIA).           * This list has 2 medication(s) that are the same as other medications prescribed for you. Read the directions carefully, and ask your doctor or other care provider to review them with you.                  Time spent on the discharge of patient: 45 minutes    Liseth Wise MD  Cardiac Electrophysiology  [facility] - Cardiology Stepdown

## 2023-10-27 NOTE — ANESTHESIA POSTPROCEDURE EVALUATION
Anesthesia Post Evaluation    Patient: Lucas Garrison    Procedure(s) Performed: Procedure(s) (LRB):  Ablation atrial fibrillation (N/A)  Transesophageal echo (NATIVIDAD) intra-procedure log documentation (N/A)  Cardioversion or Defibrillation    Final Anesthesia Type: general      Patient location during evaluation: PACU  Patient participation: Yes- Able to Participate  Level of consciousness: awake and alert  Post-procedure vital signs: reviewed and stable  Pain management: adequate  Airway patency: patent    PONV status at discharge: No PONV  Anesthetic complications: no      Cardiovascular status: hemodynamically stable  Respiratory status: unassisted  Hydration status: euvolemic  Follow-up not needed.          Vitals Value Taken Time   /75 10/27/23 0429   Temp 36.6 °C (97.8 °F) 10/27/23 0429   Pulse 52 10/27/23 0622   Resp 18 10/27/23 0429   SpO2 95 % 10/27/23 0429   Vitals shown include unvalidated device data.      No case tracking events are documented in the log.      Pain/Jay Score: Pain Rating Prior to Med Admin: 3 (10/26/2023  9:47 PM)  Pain Rating Post Med Admin: 0 (10/26/2023 10:47 PM)  Jay Score: 10 (10/26/2023  4:45 PM)

## 2023-10-27 NOTE — NURSING
Gauze placed on R groin of patient saturated with blood, pt. have ambulated to the bathroom twice since he was off bedrest. Pulses palpable +2 on bilateral feet, access site palpated, no signs of hematoma or bulging. Patient denies numbness or tingling, currently asymptomatic. Access site redressed and instructed patient to call for any new symptoms. MD notified of events, will continue to monitor.

## 2023-10-27 NOTE — PROGRESS NOTES
Patient in bed with eyes open, family at bedside. IV removed without difficulty, telemetry monitor was removed and sent to front for cleaning. All questions answered will continue to monitor

## 2023-10-27 NOTE — NURSING
Patient alert and oriented on assessment. Sutures on bilateral groin area removed at 2000 and HOB at 30 degrees. No signs of bleeding on bilateral groin site, suture removed without problem, clean, dry, and intact.     Patient off bedrest at 2200. Patient able to stand and ambulate to the bathroom. No signs of dizziness on ambulation. Will continue to monitor

## 2023-11-16 ENCOUNTER — PATIENT MESSAGE (OUTPATIENT)
Dept: CARDIOLOGY | Facility: CLINIC | Age: 73
End: 2023-11-16
Payer: MEDICARE

## 2024-01-29 DIAGNOSIS — I48.91 ATRIAL FIBRILLATION, UNSPECIFIED TYPE: ICD-10-CM

## 2024-01-29 DIAGNOSIS — I48.0 PAROXYSMAL ATRIAL FIBRILLATION: Primary | ICD-10-CM

## 2024-01-30 ENCOUNTER — PATIENT MESSAGE (OUTPATIENT)
Dept: CARDIOLOGY | Facility: CLINIC | Age: 74
End: 2024-01-30
Payer: MEDICARE

## 2024-01-31 ENCOUNTER — OFFICE VISIT (OUTPATIENT)
Dept: CARDIOLOGY | Facility: CLINIC | Age: 74
End: 2024-01-31
Payer: OTHER GOVERNMENT

## 2024-01-31 ENCOUNTER — HOSPITAL ENCOUNTER (OUTPATIENT)
Dept: CARDIOLOGY | Facility: HOSPITAL | Age: 74
Discharge: HOME | End: 2024-01-31
Attending: INTERNAL MEDICINE
Payer: OTHER GOVERNMENT

## 2024-01-31 VITALS
WEIGHT: 291.88 LBS | HEIGHT: 71 IN | BODY MASS INDEX: 40.86 KG/M2 | SYSTOLIC BLOOD PRESSURE: 120 MMHG | DIASTOLIC BLOOD PRESSURE: 78 MMHG | OXYGEN SATURATION: 94 % | HEART RATE: 85 BPM

## 2024-01-31 DIAGNOSIS — E66.01 MORBID OBESITY: ICD-10-CM

## 2024-01-31 DIAGNOSIS — I48.91 ATRIAL FIBRILLATION, UNSPECIFIED TYPE: ICD-10-CM

## 2024-01-31 DIAGNOSIS — I50.33 ACUTE ON CHRONIC DIASTOLIC HEART FAILURE: Primary | ICD-10-CM

## 2024-01-31 DIAGNOSIS — I48.19 PERSISTENT ATRIAL FIBRILLATION: ICD-10-CM

## 2024-01-31 DIAGNOSIS — I48.0 PAROXYSMAL ATRIAL FIBRILLATION: ICD-10-CM

## 2024-01-31 PROCEDURE — 93005 ELECTROCARDIOGRAM TRACING: CPT

## 2024-01-31 PROCEDURE — 99999 PR PBB SHADOW E&M-EST. PATIENT-LVL V: CPT | Mod: PBBFAC,,, | Performed by: INTERNAL MEDICINE

## 2024-01-31 PROCEDURE — 99215 OFFICE O/P EST HI 40 MIN: CPT | Mod: S$PBB,,, | Performed by: INTERNAL MEDICINE

## 2024-01-31 PROCEDURE — 99215 OFFICE O/P EST HI 40 MIN: CPT | Mod: PBBFAC | Performed by: INTERNAL MEDICINE

## 2024-01-31 PROCEDURE — 93010 ELECTROCARDIOGRAM REPORT: CPT | Mod: ,,, | Performed by: INTERNAL MEDICINE

## 2024-01-31 NOTE — PROGRESS NOTES
Subjective:   Patient ID:  Lucas Garrison is a 73 y.o. male who presents for follow-up of Atrial Fibrillation (S/p ablation by 3 months EKG a fib today)      72 yoM here for arrhythmia management.     9/23: He developed symptomatic persistent AF leading to NATIVIDAD/CV twice in August 2023. He had NATVIIDAD/CV 4/21. NSR 5/21, AF noted 6/23. EF normal. CVA prophylaxis with xarelto. He has been on amiodarone in the past (4-6/21). He was placed on flecainide a few months ago and required increased dose due to breakthrough AF event which has resulted in symptomatic bradycardia.     Interval history: He felt great for 1-2 months then he had return of AF symptoms. Back in AF today.     PVI 10/26/23:    ·  3D mapping performed with Ensite.  ·  Intracardiac echo.  ·  Pulmonary vein isolation.    NATIVIDAD 8/23:  · The left ventricle is normal in size with normal systolic function.  · Normal right ventricular size with normal right ventricular systolic function.  · Normal central venous pressure (3 mmHg).  · Atrial fibrillation observed.  · Normal appearing left atrial appendage. No thrombus is present in the appendage.  · Mild mitral regurgitation.  · Mild tricuspid regurgitation.  · The estimated ejection fraction is 60%.  · A 200 J synchronized cardioversion was successfully performed with restoration of normal sinus rhythm.     Past Medical History:  No date: atrial fibrillation  No date: Cancer  No date: DDD (degenerative disc disease)  No date: H/O prostate cancer  No date: HLD (hyperlipidemia)  No date: Hypertension  No date: Lumbago  No date: REGINA (obstructive sleep apnea)  No date: Peripheral neuropathy  No date: PTSD (post-traumatic stress disorder)    Past Surgical History:  10/26/2023: ABLATION OF ARRHYTHMOGENIC FOCUS FOR ATRIAL FIBRILLATION;   N/A      Comment:  Procedure: Ablation atrial fibrillation;  Surgeon:                Jacobo Marquez MD;  Location: [facility];                 Service: Cardiology;  Laterality: N/A;   afib, PVI, RFA,                NATIVIDAD (cx if SR), CHELSI, anes, MB, 3 Prep  6/4/2021: CATHETERIZATION OF BOTH LEFT AND RIGHT HEART; N/A      Comment:  Procedure: CATHETERIZATION, HEART, BOTH LEFT AND RIGHT;                Surgeon: Baldemar Davalos MD;  Location: [facility] CATH LAB;                Service: Cardiology;  Laterality: N/A;  12/7/2016: COLONOSCOPY; N/A      Comment:  Procedure: COLONOSCOPY;  Surgeon: Zeus Guerrier MD;  Location: [facility] ENDO;  Service: Endoscopy;                 Laterality: N/A;  2/22/2021: COLONOSCOPY; N/A      Comment:  Procedure: COLONOSCOPY;  Surgeon: Joselyn Castanon MD;  Location: [facility] ENDO;  Service: Endoscopy;                 Laterality: N/A;  6/4/2021: CORONARY ANGIOGRAPHY; N/A      Comment:  Procedure: ANGIOGRAM, CORONARY ARTERY;  Surgeon: Baldemar Davalos MD;  Location: [facility] CATH LAB;  Service:                Cardiology;  Laterality: N/A;  10/26/2023: ECHOCARDIOGRAM,TRANSESOPHAGEAL; N/A      Comment:  Procedure: Transesophageal echo (NATIVIDAD) intra-procedure                log documentation;  Surgeon: Elisha Mac MD;                 Location: [facility] EP LAB;  Service: Cardiology;  Laterality:               N/A;  2010: prostate radiation seeds  6/4/2021: RIGHT HEART CATHETERIZATION; Right      Comment:  Procedure: INSERTION, CATHETER, RIGHT HEART;  Surgeon:                Baldemar Davalos MD;  Location: [facility] CATH LAB;  Service:               Cardiology;  Laterality: Right;  No date: TONSILLECTOMY  6/20/2023: TRANSESOPHAGEAL ECHOCARDIOGRAM WITH POSSIBLE CARDIOVERSION   (NATIVIDAD W/ POSS CARDIOVERSION); N/A      Comment:  Procedure: Transesophageal echo (NATIVIDAD) intra-procedure                log documentation;  Surgeon: Baldemar Davalos MD;                 Location: [facility] CATH LAB;  Service: Cardiology;                 Laterality: N/A;  No date: TRANSURETHRAL RESECTION OF PROSTATE  8/3/2023: TREATMENT OF CARDIAC ARRHYTHMIA; N/A      Comment:   Procedure: Cardioversion or Defibrillation;  Surgeon:                Baldemar Davalos MD;  Location: [facility] CATH LAB;                 Service: Cardiology;  Laterality: N/A;  10/26/2023: TREATMENT OF CARDIAC ARRHYTHMIA      Comment:  Procedure: Cardioversion or Defibrillation;  Surgeon:                Jacobo Marquez MD;  Location: [facility] EP LAB;                 Service: Cardiology;;    Social History    Socioeconomic History      Marital status:     Tobacco Use      Smoking status: Former        Packs/day: 0.00        Years: 1.5 packs/day for 10.0 years (15.0 ttl pk-yrs)        Types: Cigarettes        Start date: 10/30/1972        Quit date: 10/30/1982        Years since quittin.2      Smokeless tobacco: Never    Substance and Sexual Activity      Alcohol use: No      Drug use: No    Social Determinants of Health  Financial Resource Strain: Low Risk  (2024)      Overall Financial Resource Strain (CARDIA)          Difficulty of Paying Living Expenses: Not very hard  Food Insecurity: No Food Insecurity (2024)      Hunger Vital Sign          Worried About Running Out of Food in the Last Year: Never true          Ran Out of Food in the Last Year: Never true  Transportation Needs: No Transportation Needs (2024)      PRAPARE - Transportation          Lack of Transportation (Medical): No          Lack of Transportation (Non-Medical): No  Physical Activity: Insufficiently Active (2024)      Exercise Vital Sign          Days of Exercise per Week: 3 days          Minutes of Exercise per Session: 30 min  Stress: Stress Concern Present (2024)      [place] [employer] - Occupational Stress Questionnaire          Feeling of Stress : To some extent  Social Connections: Unknown (2024)      Social Connection and Isolation Panel [NHANES]          Frequency of Communication with Friends and Family: Once a week          Frequency of Social Gatherings with Friends and Family:  Once a week          Active Member of Clubs or Organizations: Yes          Attends Club or Organization Meetings: 1 to 4 times per year          Marital Status:   Housing Stability: Low Risk  (1/8/2024)      Housing Stability Vital Sign          Unable to Pay for Housing in the Last Year: No          Number of Places Lived in the Last Year: 1          Unstable Housing in the Last Year: No    Review of patient's family history indicates:  Problem: COPD      Relation: Mother          Age of Onset: (Not Specified)  Problem: Depression      Relation: Brother          Age of Onset: (Not Specified)  Problem: Colon cancer      Relation: Neg Hx          Age of Onset: (Not Specified)      Current Outpatient Medications:  albuterol (PROVENTIL/VENTOLIN HFA) 90 mcg/actuation inhaler, INHALE 2 PUFFS BY MOUTH FOUR TIMES A DAY AS NEEDED FOR BRONCHOSPASM PREVENTION, Disp: , Rfl:   amitriptyline (ELAVIL) 50 MG tablet, Take 50 mg by mouth every evening., Disp: , Rfl:   amLODIPine (NORVASC) 10 MG tablet, Take 1 tablet (10 mg total) by mouth once daily., Disp: 90 tablet, Rfl: 3  apixaban (ELIQUIS) 5 mg Tab, Take 1 tablet (5 mg total) by mouth 2 (two) times daily., Disp: 180 tablet, Rfl: 3  aspirin (ECOTRIN) 81 MG EC tablet, Take 81 mg by mouth once daily., Disp: , Rfl:   chlorhexidine (HIBICLENS) 4 % external liquid, APPLY SMALL AMOUNT TOPICALLY EVERY DAY - USE TO WASH HAIR/SCALP EVERY DAY - THOROUGHLY RINSE THE AREA TO BE CLEANED WITH WATER. APPLY THE MINIMUM AMOUNT OF PRODUCT NECESSARY TO COVER THE SKIN  AND WASH GENTLY.RINSE AGAIN THOROUGHLY, Disp: , Rfl:   citalopram (CELEXA) 40 MG tablet, Take 40 mg by mouth once daily., Disp: , Rfl:   clobetasoL (TEMOVATE) 0.05 % external solution, APPLY MODERATE AMOUNT TOPICALLY TWICE A DAY AS NEEDED TO THE SCALP, Disp: , Rfl:   EScitalopram oxalate (LEXAPRO) 20 MG tablet, Take 20 mg by mouth once daily., Disp: , Rfl:   fish oil-omega-3 fatty acids 300-1,000 mg capsule, Take 1 g by mouth  once daily., Disp: , Rfl:   furosemide (LASIX) 80 MG tablet, Take 1 tablet (80 mg total) by mouth 2 (two) times daily., Disp: 60 tablet, Rfl: 11  gabapentin (NEURONTIN) 600 MG tablet, Take 600 mg by mouth., Disp: , Rfl:   loratadine (CLARITIN) 10 mg tablet, Take 10 mg by mouth once daily., Disp: , Rfl:   losartan (COZAAR) 100 MG tablet, Take 1 tablet (100 mg total) by mouth once daily., Disp: 90 tablet, Rfl: 3  methocarbamol (ROBAXIN) 750 MG Tab, Take 500 mg by mouth 3 (three) times daily., Disp: , Rfl:   mirtazapine (REMERON) 30 MG tablet, Take 30 mg by mouth every evening., Disp: , Rfl:   multivitamin capsule, Take 1 capsule by mouth once daily., Disp: , Rfl:   naproxen (NAPROSYN) 500 MG tablet, Take 500 mg by mouth 2 (two) times daily with meals., Disp: , Rfl:   omeprazole (PRILOSEC) 20 MG capsule, Take 20 mg by mouth once daily., Disp: , Rfl:   pravastatin (PRAVACHOL) 80 MG tablet, Take 1 tablet (80 mg total) by mouth once daily., Disp: 90 tablet, Rfl: 3  prazosin (MINIPRESS) 2 MG Cap, Take 2 mg by mouth once daily., Disp: , Rfl:    prazosin (MINIPRESS) 5 MG capsule, Take 5 mg by mouth 2 (two) times daily., Disp: , Rfl:   QUEtiapine (SEROQUEL) 50 MG tablet, Take 50 mg by mouth nightly. One-half tablet, Disp: , Rfl:   traZODone (DESYREL) 50 MG tablet, Take 50 mg by mouth every evening., Disp: , Rfl:   zolpidem (AMBIEN) 5 MG Tab, Take 1 tablet (5 mg total) by mouth nightly as needed (INSOMNIA). (Patient taking differently: Take 10 mg by mouth nightly as needed (INSOMNIA).), Disp: 30 tablet, Rfl: 5    No current facility-administered medications for this visit.            Review of Systems   Constitutional: Positive for malaise/fatigue.   HENT: Negative.     Eyes: Negative.    Cardiovascular:  Positive for dyspnea on exertion. Negative for chest pain, leg swelling, near-syncope, palpitations and syncope.   Respiratory: Negative.  Negative for shortness of breath.    Endocrine: Negative.    Hematologic/Lymphatic:  Negative.    Skin: Negative.    Musculoskeletal: Negative.    Gastrointestinal: Negative.    Genitourinary: Negative.    Neurological: Negative.  Negative for dizziness and light-headedness.   Psychiatric/Behavioral: Negative.     Allergic/Immunologic: Negative.        Objective:   Physical Exam  Vitals reviewed.   Constitutional:       General: He is not in acute distress.     Appearance: He is well-developed.   HENT:      Head: Normocephalic and atraumatic.   Eyes:      Pupils: Pupils are equal, round, and reactive to light.   Neck:      Thyroid: No thyromegaly.      Vascular: No JVD.   Cardiovascular:      Rate and Rhythm: Normal rate. Rhythm irregular.      Chest Wall: PMI is not displaced.      Heart sounds: Normal heart sounds, S1 normal and S2 normal. No murmur heard.     No friction rub. No gallop.   Pulmonary:      Effort: Pulmonary effort is normal. No respiratory distress.      Breath sounds: Normal breath sounds. No wheezing or rales.   Abdominal:      General: Bowel sounds are normal. There is no distension.      Palpations: Abdomen is soft.      Tenderness: There is no abdominal tenderness. There is no guarding or rebound.   Musculoskeletal:         General: No tenderness. Normal range of motion.      Cervical back: Normal range of motion.   Skin:     General: Skin is warm and dry.      Findings: No erythema or rash.   Neurological:      Mental Status: He is alert and oriented to person, place, and time.      Cranial Nerves: No cranial nerve deficit.   Psychiatric:         Behavior: Behavior normal.         Thought Content: Thought content normal.         Judgment: Judgment normal.       ECg: AF, controlled V rate    Assessment:      1. Acute on chronic diastolic heart failure    2. Morbid obesity    3. Persistent atrial fibrillation        Plan:   73 yoM here for arrhythmia management. Recurrence of AF after PVI. I offered repeat ablation which he agreed to. I had extensive discussion with patient  regarding risks and benefits of PVI/WACA, and the patient would like to proceed. Risks of procedure include (but are not limited to) bleeding, stroke, perforation requiring emergency draining or surgery, AV block, death, AV fistula, AE fistula, PV stenosis.    Last anti-coagulation dose night prior to procedure.  Discontinue antiarrhytmic drugs 4 days prior to the procedure.     RF re-do PVI, add CTI and PWI  Anesthesia  NATIVIDAD prior, cancel if NSR  CHELSI

## 2024-02-02 DIAGNOSIS — I48.19 PERSISTENT ATRIAL FIBRILLATION: Primary | ICD-10-CM

## 2024-02-21 ENCOUNTER — PATIENT MESSAGE (OUTPATIENT)
Dept: ELECTROPHYSIOLOGY | Facility: CLINIC | Age: 74
End: 2024-02-21
Payer: MEDICARE

## 2024-03-07 ENCOUNTER — OFFICE VISIT (OUTPATIENT)
Dept: CARDIOLOGY | Facility: CLINIC | Age: 74
End: 2024-03-07
Payer: OTHER GOVERNMENT

## 2024-03-07 VITALS
OXYGEN SATURATION: 96 % | SYSTOLIC BLOOD PRESSURE: 112 MMHG | HEART RATE: 80 BPM | DIASTOLIC BLOOD PRESSURE: 69 MMHG | WEIGHT: 295.19 LBS | BODY MASS INDEX: 41.17 KG/M2

## 2024-03-07 DIAGNOSIS — I48.0 PAROXYSMAL ATRIAL FIBRILLATION: Primary | ICD-10-CM

## 2024-03-07 DIAGNOSIS — Z98.890 HISTORY OF CORONARY ANGIOGRAM: ICD-10-CM

## 2024-03-07 DIAGNOSIS — Z77.098 H/O AGENT ORANGE EXPOSURE: ICD-10-CM

## 2024-03-07 DIAGNOSIS — E66.01 MORBID OBESITY: ICD-10-CM

## 2024-03-07 DIAGNOSIS — R06.09 DOE (DYSPNEA ON EXERTION): ICD-10-CM

## 2024-03-07 DIAGNOSIS — F43.10 PTSD (POST-TRAUMATIC STRESS DISORDER): ICD-10-CM

## 2024-03-07 DIAGNOSIS — I49.9 CARDIAC ARRHYTHMIA, UNSPECIFIED CARDIAC ARRHYTHMIA TYPE: ICD-10-CM

## 2024-03-07 DIAGNOSIS — E78.5 HYPERLIPIDEMIA, UNSPECIFIED HYPERLIPIDEMIA TYPE: ICD-10-CM

## 2024-03-07 DIAGNOSIS — I10 HYPERTENSION, UNSPECIFIED TYPE: ICD-10-CM

## 2024-03-07 DIAGNOSIS — I48.91 ATRIAL FIBRILLATION, UNSPECIFIED TYPE: ICD-10-CM

## 2024-03-07 DIAGNOSIS — G47.33 OSA (OBSTRUCTIVE SLEEP APNEA): ICD-10-CM

## 2024-03-07 LAB
OHS QRS DURATION: 92 MS
OHS QTC CALCULATION: 444 MS

## 2024-03-07 PROCEDURE — 99214 OFFICE O/P EST MOD 30 MIN: CPT | Mod: S$PBB,,, | Performed by: INTERNAL MEDICINE

## 2024-03-07 PROCEDURE — 99999 PR PBB SHADOW E&M-EST. PATIENT-LVL IV: CPT | Mod: PBBFAC,,, | Performed by: INTERNAL MEDICINE

## 2024-03-07 PROCEDURE — 99214 OFFICE O/P EST MOD 30 MIN: CPT | Mod: PBBFAC | Performed by: INTERNAL MEDICINE

## 2024-03-07 PROCEDURE — 93010 ELECTROCARDIOGRAM REPORT: CPT | Mod: ,,, | Performed by: INTERNAL MEDICINE

## 2024-03-07 PROCEDURE — 93005 ELECTROCARDIOGRAM TRACING: CPT

## 2024-03-07 RX ORDER — BISOPROLOL FUMARATE 5 MG/1
5 TABLET, FILM COATED ORAL DAILY
COMMUNITY

## 2024-03-07 NOTE — PROGRESS NOTES
Subjective:   Patient ID:  Lucas Garrison is a 73 y.o. male who presents for evaluation of No chief complaint on file.    3.7.2024  73-year-old male with past medical history below.  History of AFib status post 2 cardioversions and an ablation in October 2023 lastly.    Continues to go back in to AFib.  With symptoms of fatigue.  With plan to have a repeat ablation next month with Dr. Franks.    Compliant with the medications.    We had stopped amiodarone and flecainide in the past due to severe bradycardia.    No bleeding.      Shortness of Breath  Associated symptoms include leg swelling. Pertinent negatives include no chest pain or syncope.   Dizziness: no syncope, no palpitations and no chest pain.    Past Medical History:   Diagnosis Date    atrial fibrillation     Cancer     DDD (degenerative disc disease)     H/O prostate cancer     HLD (hyperlipidemia)     Hypertension     Lumbago     REGINA (obstructive sleep apnea)     Peripheral neuropathy     PTSD (post-traumatic stress disorder)        Past Surgical History:   Procedure Laterality Date    ABLATION OF ARRHYTHMOGENIC FOCUS FOR ATRIAL FIBRILLATION N/A 10/26/2023    Procedure: Ablation atrial fibrillation;  Surgeon: Jacobo Marquez MD;  Location: [facility] EP LAB;  Service: Cardiology;  Laterality: N/A;  afib, PVI, RFA, NATIVIDAD (cx if SR), CHELSI, anes, MB, 3 Prep    CATHETERIZATION OF BOTH LEFT AND RIGHT HEART N/A 6/4/2021    Procedure: CATHETERIZATION, HEART, BOTH LEFT AND RIGHT;  Surgeon: Baldemar Davalos MD;  Location: [facility] CATH LAB;  Service: Cardiology;  Laterality: N/A;    COLONOSCOPY N/A 12/7/2016    Procedure: COLONOSCOPY;  Surgeon: Zeus Guerrier MD;  Location: [facility] ENDO;  Service: Endoscopy;  Laterality: N/A;    COLONOSCOPY N/A 2/22/2021    Procedure: COLONOSCOPY;  Surgeon: Joselyn Castanon MD;  Location: [facility] ENDO;  Service: Endoscopy;  Laterality: N/A;    CORONARY ANGIOGRAPHY N/A 6/4/2021    Procedure: ANGIOGRAM, CORONARY ARTERY;  Surgeon:  Baldemar Davalos MD;  Location: [facility] CATH LAB;  Service: Cardiology;  Laterality: N/A;    ECHOCARDIOGRAM,TRANSESOPHAGEAL N/A 10/26/2023    Procedure: Transesophageal echo (NATIVIDAD) intra-procedure log documentation;  Surgeon: Elisha Mac MD;  Location: [facility] EP LAB;  Service: Cardiology;  Laterality: N/A;    prostate radiation seeds      RIGHT HEART CATHETERIZATION Right 2021    Procedure: INSERTION, CATHETER, RIGHT HEART;  Surgeon: Baldemar Davalos MD;  Location: [facility] CATH LAB;  Service: Cardiology;  Laterality: Right;    TONSILLECTOMY      TRANSESOPHAGEAL ECHOCARDIOGRAM WITH POSSIBLE CARDIOVERSION (NATIVIDAD W/ POSS CARDIOVERSION) N/A 2023    Procedure: Transesophageal echo (NATIVIDAD) intra-procedure log documentation;  Surgeon: Baldemar Davalos MD;  Location: [facility] CATH LAB;  Service: Cardiology;  Laterality: N/A;    TRANSURETHRAL RESECTION OF PROSTATE      TREATMENT OF CARDIAC ARRHYTHMIA N/A 8/3/2023    Procedure: Cardioversion or Defibrillation;  Surgeon: Baldemar Davalos MD;  Location: [facility] CATH LAB;  Service: Cardiology;  Laterality: N/A;    TREATMENT OF CARDIAC ARRHYTHMIA  10/26/2023    Procedure: Cardioversion or Defibrillation;  Surgeon: Jacobo Marquez MD;  Location: [facility] EP LAB;  Service: Cardiology;;       Social History     Tobacco Use    Smoking status: Former     Current packs/day: 0.00     Average packs/day: 1.5 packs/day for 10.0 years (15.0 ttl pk-yrs)     Types: Cigarettes     Start date: 10/30/1972     Quit date: 10/30/1982     Years since quittin.3    Smokeless tobacco: Never   Substance Use Topics    Alcohol use: No    Drug use: No       Family History   Problem Relation Age of Onset    COPD Mother     Depression Brother     Colon cancer Neg Hx        Review of Systems   Constitutional: Positive for malaise/fatigue.   Cardiovascular:  Positive for leg swelling. Negative for chest pain, dyspnea on exertion, palpitations and syncope.   Respiratory:  Negative for shortness of  breath.    Genitourinary: Negative.    Neurological:  Positive for dizziness.       Current Outpatient Medications on File Prior to Visit   Medication Sig    albuterol (PROVENTIL/VENTOLIN HFA) 90 mcg/actuation inhaler INHALE 2 PUFFS BY MOUTH FOUR TIMES A DAY AS NEEDED FOR BRONCHOSPASM PREVENTION    amLODIPine (NORVASC) 10 MG tablet Take 1 tablet (10 mg total) by mouth once daily.    apixaban (ELIQUIS) 5 mg Tab Take 1 tablet (5 mg total) by mouth 2 (two) times daily.    aspirin (ECOTRIN) 81 MG EC tablet Take 81 mg by mouth once daily.    bisoprolol (ZEBETA) 5 MG tablet Take 5 mg by mouth once daily.    chlorhexidine (HIBICLENS) 4 % external liquid APPLY SMALL AMOUNT TOPICALLY EVERY DAY - USE TO WASH HAIR/SCALP EVERY DAY - THOROUGHLY RINSE THE AREA TO BE CLEANED WITH WATER. APPLY THE MINIMUM AMOUNT OF PRODUCT NECESSARY TO COVER THE SKIN  AND WASH GENTLY.RINSE AGAIN THOROUGHLY    clobetasoL (TEMOVATE) 0.05 % external solution APPLY MODERATE AMOUNT TOPICALLY TWICE A DAY AS NEEDED TO THE SCALP    EScitalopram oxalate (LEXAPRO) 20 MG tablet Take 20 mg by mouth once daily.    fish oil-omega-3 fatty acids 300-1,000 mg capsule Take 1 g by mouth once daily.    furosemide (LASIX) 80 MG tablet Take 1 tablet (80 mg total) by mouth 2 (two) times daily.    gabapentin (NEURONTIN) 600 MG tablet Take 600 mg by mouth.    losartan (COZAAR) 100 MG tablet Take 1 tablet (100 mg total) by mouth once daily.    mirtazapine (REMERON) 30 MG tablet Take 30 mg by mouth every evening.    multivitamin capsule Take 1 capsule by mouth once daily.    naproxen (NAPROSYN) 500 MG tablet Take 500 mg by mouth 2 (two) times daily with meals.    omeprazole (PRILOSEC) 20 MG capsule Take 20 mg by mouth once daily.    pravastatin (PRAVACHOL) 80 MG tablet Take 1 tablet (80 mg total) by mouth once daily.    prazosin (MINIPRESS) 5 MG capsule Take 5 mg by mouth 2 (two) times daily.    QUEtiapine (SEROQUEL) 50 MG tablet Take 50 mg by mouth nightly. One-half tablet  "   traZODone (DESYREL) 50 MG tablet Take 50 mg by mouth every evening.    zolpidem (AMBIEN) 5 MG Tab Take 1 tablet (5 mg total) by mouth nightly as needed (INSOMNIA). (Patient taking differently: Take 10 mg by mouth nightly as needed (INSOMNIA).)    amitriptyline (ELAVIL) 50 MG tablet Take 50 mg by mouth every evening.    loratadine (CLARITIN) 10 mg tablet Take 10 mg by mouth once daily.    methocarbamol (ROBAXIN) 750 MG Tab Take 500 mg by mouth 3 (three) times daily.     No current facility-administered medications on file prior to visit.       Objective:   Objective:  Wt Readings from Last 3 Encounters:   03/07/24 133.9 kg (295 lb 3.1 oz)   01/31/24 132.4 kg (291 lb 14.2 oz)   10/26/23 130.7 kg (288 lb 2.3 oz)     Temp Readings from Last 3 Encounters:   10/27/23 98.5 °F (36.9 °C) (Oral)   08/03/23 98.1 °F (36.7 °C)   06/20/23 97.7 °F (36.5 °C) (Temporal)     BP Readings from Last 3 Encounters:   03/07/24 112/69   01/31/24 120/78   10/27/23 131/73     Pulse Readings from Last 3 Encounters:   03/07/24 80   01/31/24 85   10/27/23 64       Physical Exam  Vitals reviewed.   Constitutional:       Appearance: He is well-developed.   Neck:      Vascular: No carotid bruit.   Cardiovascular:      Rate and Rhythm: Rhythm irregular.      Pulses: Intact distal pulses.      Heart sounds: Normal heart sounds. No murmur heard.  Pulmonary:      Breath sounds: Normal breath sounds.   Musculoskeletal:         General: Swelling present.   Neurological:      Mental Status: He is oriented to person, place, and time.         No results found for: "CHOL"  No results found for: "HDL"  No results found for: "LDLCALC"  No results found for: "TRIG"  No results found for: "CHOLHDL"    Chemistry        Component Value Date/Time     10/19/2023 1119    K 4.6 10/19/2023 1119     10/19/2023 1119    CO2 25 10/19/2023 1119    BUN 16 10/19/2023 1119    CREATININE 1.5 (H) 10/19/2023 1119     (H) 10/19/2023 1119        Component " "Value Date/Time    CALCIUM 9.6 10/19/2023 1119    ALKPHOS 52 (L) 04/06/2021 0729    AST 32 04/06/2021 0729    ALT 45 (H) 04/06/2021 0729    BILITOT 0.7 04/06/2021 0729    ESTGFRAFRICA >60.0 01/03/2022 1234    EGFRNONAA >60.0 01/03/2022 1234          No results found for: "TSH"  Lab Results   Component Value Date    INR 1.1 10/19/2023    INR 1.1 07/27/2023    INR 1.0 06/15/2023     Lab Results   Component Value Date    WBC 5.50 10/19/2023    HGB 15.7 10/19/2023    HCT 47.0 10/19/2023     (H) 10/19/2023     10/19/2023     BNP  @LABRCNTIP(BNP,BNPTRIAGEBLO)@  CrCl cannot be calculated (Patient's most recent lab result is older than the maximum 7 days allowed.).     Imaging:  ======  No results found for this or any previous visit.    No results found for this or any previous visit.    No results found for this or any previous visit.    No results found for this or any previous visit.    No results found for this or any previous visit.    No valid procedures specified.    Diagnostic Results:  ECG: Reviewed, nsr after dccv       States he had a normal heart cath in 2010 with Dr Preciado       2021   Left Main   The vessel is large.   Left Anterior Descending   First Diagonal Branch   The vessel is large.   Ramus Intermedius   The vessel is large.   Left Circumflex   The vessel is large.   First Obtuse Marginal Branch   The vessel is large.   Right Coronary Artery   The vessel is large.   Right Posterior Descending Artery   The vessel is large.   Right Posterior Atrioventricular Artery   The vessel is angiographically normal.   Intervention    No interventions have been documented.  Left Heart Findings    Left Ventricle    LVEDP (Pre):15 mmHG   Aortic Valve    No gradient on pullback   Right Heart Findings    Right Heart Pressures    RA: 8 RV: 38/ 8 PA: 36/ 17/ 22 PWP: 15 .   Cardiac output was 8. Cardiac index is 3.22 L/min/m2.   O2 Sat: PA 75%.       The ASCVD Risk score (April DK, et al., 2019) failed to " calculate for the following reasons:    Cannot find a previous HDL lab    Cannot find a previous total cholesterol lab    Assessment and Plan:   Paroxysmal atrial fibrillation  -     IN OFFICE EKG 12-LEAD (to Muse)    Atrial fibrillation, unspecified type    SINGH (dyspnea on exertion)    Cardiac arrhythmia, unspecified cardiac arrhythmia type    Morbid obesity    REGINA (obstructive sleep apnea)    H/O agent [place] exposure    Hypertension, unspecified type    Hyperlipidemia, unspecified hyperlipidemia type    History of coronary angiogram    PTSD (post-traumatic stress disorder)      He is compliant with the CPAP.    Reviewed all tests and above medical conditions with patient in detail and formulated treatment plan.  Risk factor modification discussed.   Cardiac low salt diet discussed.  Maintaining healthy weight and weight loss goals were discussed in clinic.  EKG shows AFib today 3.7.2024.  Continue with bisoprolol.  Rate controlled.  Going for repeat 2nd ablation with Dr. Marquez next month    Follow up in 6 months

## 2024-04-08 ENCOUNTER — LAB VISIT (OUTPATIENT)
Dept: LAB | Facility: HOSPITAL | Age: 74
End: 2024-04-08
Attending: INTERNAL MEDICINE
Payer: MEDICARE

## 2024-04-08 DIAGNOSIS — I48.19 PERSISTENT ATRIAL FIBRILLATION: ICD-10-CM

## 2024-04-08 LAB
ANION GAP SERPL CALC-SCNC: 9 MMOL/L (ref 8–16)
APTT PPP: 28.7 SEC (ref 21–32)
BUN SERPL-MCNC: 15 MG/DL (ref 8–23)
CALCIUM SERPL-MCNC: 9.2 MG/DL (ref 8.7–10.5)
CHLORIDE SERPL-SCNC: 107 MMOL/L (ref 95–110)
CO2 SERPL-SCNC: 30 MMOL/L (ref 23–29)
CREAT SERPL-MCNC: 1.4 MG/DL (ref 0.5–1.4)
ERYTHROCYTE [DISTWIDTH] IN BLOOD BY AUTOMATED COUNT: 13.1 % (ref 11.5–14.5)
EST. GFR  (NO RACE VARIABLE): 52.7 ML/MIN/1.73 M^2
GLUCOSE SERPL-MCNC: 103 MG/DL (ref 70–110)
HCT VFR BLD AUTO: 43.3 % (ref 40–54)
HGB BLD-MCNC: 15.1 G/DL (ref 14–18)
INR PPP: 1.1 (ref 0.8–1.2)
MCH RBC QN AUTO: 33.9 PG (ref 27–31)
MCHC RBC AUTO-ENTMCNC: 34.9 G/DL (ref 32–36)
MCV RBC AUTO: 97 FL (ref 82–98)
PLATELET # BLD AUTO: 194 K/UL (ref 150–450)
PMV BLD AUTO: 11 FL (ref 9.2–12.9)
POTASSIUM SERPL-SCNC: 4.1 MMOL/L (ref 3.5–5.1)
PROTHROMBIN TIME: 11.4 SEC (ref 9–12.5)
RBC # BLD AUTO: 4.46 M/UL (ref 4.6–6.2)
SODIUM SERPL-SCNC: 146 MMOL/L (ref 136–145)
WBC # BLD AUTO: 4.94 K/UL (ref 3.9–12.7)

## 2024-04-08 PROCEDURE — 85730 THROMBOPLASTIN TIME PARTIAL: CPT | Performed by: INTERNAL MEDICINE

## 2024-04-08 PROCEDURE — 85610 PROTHROMBIN TIME: CPT | Performed by: INTERNAL MEDICINE

## 2024-04-08 PROCEDURE — 85027 COMPLETE CBC AUTOMATED: CPT | Performed by: INTERNAL MEDICINE

## 2024-04-08 PROCEDURE — 36415 COLL VENOUS BLD VENIPUNCTURE: CPT | Performed by: INTERNAL MEDICINE

## 2024-04-08 PROCEDURE — 80048 BASIC METABOLIC PNL TOTAL CA: CPT | Performed by: INTERNAL MEDICINE

## 2024-04-12 ENCOUNTER — TELEPHONE (OUTPATIENT)
Dept: ELECTROPHYSIOLOGY | Facility: CLINIC | Age: 74
End: 2024-04-12
Payer: MEDICARE

## 2024-04-12 NOTE — TELEPHONE ENCOUNTER
Spoke to patient    CONFIRMED procedure arrival time of 10am    Reiterated instructions including:  -Directions to check in desk  -NPO after midnight night prior to procedure  -High importance of HOLDING Eliquis on the morning of the procedure.  -Pre-procedure LABS completed and reviewed  -Confirmed absence or presence of implanted device/stimulator no devices or stimulators.  -Confirmed no fever, cough, or shortness of breath in the past 30 days  -Confirmed no redness, rash, irritation, or yeast infection to groin area.     Patient verbalized understanding of above and appreciated the call.

## 2024-04-15 ENCOUNTER — HOSPITAL ENCOUNTER (OUTPATIENT)
Dept: CARDIOLOGY | Facility: HOSPITAL | Age: 74
Discharge: HOME | End: 2024-04-15
Attending: INTERNAL MEDICINE | Admitting: INTERNAL MEDICINE
Payer: MEDICARE

## 2024-04-15 ENCOUNTER — ANESTHESIA EVENT (OUTPATIENT)
Dept: MEDSURG UNIT | Facility: HOSPITAL | Age: 74
End: 2024-04-15
Payer: MEDICARE

## 2024-04-15 ENCOUNTER — HOSPITAL ENCOUNTER (OUTPATIENT)
Facility: HOSPITAL | Age: 74
Discharge: HOME | End: 2024-04-16
Attending: INTERNAL MEDICINE | Admitting: INTERNAL MEDICINE
Payer: MEDICARE

## 2024-04-15 ENCOUNTER — ANESTHESIA (OUTPATIENT)
Dept: MEDSURG UNIT | Facility: HOSPITAL | Age: 74
End: 2024-04-15
Payer: MEDICARE

## 2024-04-15 VITALS
DIASTOLIC BLOOD PRESSURE: 83 MMHG | HEIGHT: 71 IN | BODY MASS INDEX: 42 KG/M2 | SYSTOLIC BLOOD PRESSURE: 130 MMHG | WEIGHT: 300 LBS

## 2024-04-15 DIAGNOSIS — I48.91 A-FIB: Primary | ICD-10-CM

## 2024-04-15 DIAGNOSIS — I49.9 ARRHYTHMIA: ICD-10-CM

## 2024-04-15 DIAGNOSIS — I48.19 PERSISTENT ATRIAL FIBRILLATION: ICD-10-CM

## 2024-04-15 DIAGNOSIS — I48.91 ATRIAL FIBRILLATION: ICD-10-CM

## 2024-04-15 LAB
AORTIC ROOT ANNULUS: 2 CM
ASCENDING AORTA: 3.2 CM
BSA FOR ECHO PROCEDURE: 2.61 M2
EJECTION FRACTION: 55 %
OHS QRS DURATION: 94 MS
OHS QTC CALCULATION: 447 MS
POC ACTIVATED CLOTTING TIME K: 147 SEC (ref 74–137)
POC ACTIVATED CLOTTING TIME K: 152 SEC (ref 74–137)
POC ACTIVATED CLOTTING TIME K: 293 SEC (ref 74–137)
POC ACTIVATED CLOTTING TIME K: 320 SEC (ref 74–137)
POC ACTIVATED CLOTTING TIME K: 336 SEC (ref 74–137)
SAMPLE: ABNORMAL
SINUS: 3.6 CM
STJ: 2.9 CM

## 2024-04-15 PROCEDURE — 36620 INSERTION CATHETER ARTERY: CPT | Mod: 59,,, | Performed by: ANESTHESIOLOGY

## 2024-04-15 PROCEDURE — 25000003 PHARM REV CODE 250: Performed by: INTERNAL MEDICINE

## 2024-04-15 PROCEDURE — 37000009 HC ANESTHESIA EA ADD 15 MINS: Performed by: INTERNAL MEDICINE

## 2024-04-15 PROCEDURE — 93010 ELECTROCARDIOGRAM REPORT: CPT | Mod: ,,, | Performed by: INTERNAL MEDICINE

## 2024-04-15 PROCEDURE — 27201037 HC PRESSURE MONITORING SET UP

## 2024-04-15 PROCEDURE — 63600175 PHARM REV CODE 636 W HCPCS: Performed by: NURSE ANESTHETIST, CERTIFIED REGISTERED

## 2024-04-15 PROCEDURE — C1730 CATH, EP, 19 OR FEW ELECT: HCPCS | Performed by: INTERNAL MEDICINE

## 2024-04-15 PROCEDURE — C1753 CATH, INTRAVAS ULTRASOUND: HCPCS | Performed by: INTERNAL MEDICINE

## 2024-04-15 PROCEDURE — 93320 DOPPLER ECHO COMPLETE: CPT | Mod: 26,,, | Performed by: INTERNAL MEDICINE

## 2024-04-15 PROCEDURE — 93325 DOPPLER ECHO COLOR FLOW MAPG: CPT

## 2024-04-15 PROCEDURE — 93656 COMPRE EP EVAL ABLTJ ATR FIB: CPT | Performed by: INTERNAL MEDICINE

## 2024-04-15 PROCEDURE — 93657 TX L/R ATRIAL FIB ADDL: CPT | Performed by: INTERNAL MEDICINE

## 2024-04-15 PROCEDURE — 92960 CARDIOVERSION ELECTRIC EXT: CPT | Mod: 59,GC,, | Performed by: INTERNAL MEDICINE

## 2024-04-15 PROCEDURE — 93005 ELECTROCARDIOGRAM TRACING: CPT | Mod: 59

## 2024-04-15 PROCEDURE — D9220A PRA ANESTHESIA: Mod: CRNA,,, | Performed by: NURSE ANESTHETIST, CERTIFIED REGISTERED

## 2024-04-15 PROCEDURE — 92960 CARDIOVERSION ELECTRIC EXT: CPT | Mod: 59 | Performed by: INTERNAL MEDICINE

## 2024-04-15 PROCEDURE — 94761 N-INVAS EAR/PLS OXIMETRY MLT: CPT

## 2024-04-15 PROCEDURE — 51702 INSERT TEMP BLADDER CATH: CPT

## 2024-04-15 PROCEDURE — C1887 CATHETER, GUIDING: HCPCS | Performed by: INTERNAL MEDICINE

## 2024-04-15 PROCEDURE — 93005 ELECTROCARDIOGRAM TRACING: CPT

## 2024-04-15 PROCEDURE — 93312 ECHO TRANSESOPHAGEAL: CPT | Mod: 26,,, | Performed by: INTERNAL MEDICINE

## 2024-04-15 PROCEDURE — C1894 INTRO/SHEATH, NON-LASER: HCPCS | Performed by: INTERNAL MEDICINE

## 2024-04-15 PROCEDURE — 93655 ICAR CATH ABLTJ DSCRT ARRHYT: CPT | Mod: GC,,, | Performed by: INTERNAL MEDICINE

## 2024-04-15 PROCEDURE — C1766 INTRO/SHEATH,STRBLE,NON-PEEL: HCPCS | Performed by: INTERNAL MEDICINE

## 2024-04-15 PROCEDURE — 93655 ICAR CATH ABLTJ DSCRT ARRHYT: CPT | Performed by: INTERNAL MEDICINE

## 2024-04-15 PROCEDURE — 25000003 PHARM REV CODE 250: Performed by: NURSE ANESTHETIST, CERTIFIED REGISTERED

## 2024-04-15 PROCEDURE — C2630 CATH, EP, COOL-TIP: HCPCS | Performed by: INTERNAL MEDICINE

## 2024-04-15 PROCEDURE — D9220A PRA ANESTHESIA: Mod: ANES,,, | Performed by: ANESTHESIOLOGY

## 2024-04-15 PROCEDURE — 93656 COMPRE EP EVAL ABLTJ ATR FIB: CPT | Mod: 22,GC,, | Performed by: INTERNAL MEDICINE

## 2024-04-15 PROCEDURE — 63600175 PHARM REV CODE 636 W HCPCS: Performed by: INTERNAL MEDICINE

## 2024-04-15 PROCEDURE — 27000221 HC OXYGEN, UP TO 24 HOURS

## 2024-04-15 PROCEDURE — 27201423 OPTIME MED/SURG SUP & DEVICES STERILE SUPPLY: Performed by: INTERNAL MEDICINE

## 2024-04-15 PROCEDURE — 37000008 HC ANESTHESIA 1ST 15 MINUTES: Performed by: INTERNAL MEDICINE

## 2024-04-15 PROCEDURE — 93325 DOPPLER ECHO COLOR FLOW MAPG: CPT | Mod: 26,,, | Performed by: INTERNAL MEDICINE

## 2024-04-15 RX ORDER — ONDANSETRON HYDROCHLORIDE 2 MG/ML
INJECTION, SOLUTION INTRAVENOUS
Status: DISCONTINUED | OUTPATIENT
Start: 2024-04-15 | End: 2024-04-15

## 2024-04-15 RX ORDER — PHENYLEPHRINE HYDROCHLORIDE 10 MG/ML
INJECTION INTRAVENOUS
Status: DISCONTINUED | OUTPATIENT
Start: 2024-04-15 | End: 2024-04-15

## 2024-04-15 RX ORDER — FUROSEMIDE 10 MG/ML
INJECTION INTRAMUSCULAR; INTRAVENOUS
Status: DISCONTINUED | OUTPATIENT
Start: 2024-04-15 | End: 2024-04-15

## 2024-04-15 RX ORDER — ESCITALOPRAM OXALATE 20 MG/1
20 TABLET ORAL DAILY
Status: DISCONTINUED | OUTPATIENT
Start: 2024-04-16 | End: 2024-04-16 | Stop reason: HOSPADM

## 2024-04-15 RX ORDER — DEXMEDETOMIDINE HYDROCHLORIDE 100 UG/ML
INJECTION, SOLUTION INTRAVENOUS
Status: DISCONTINUED | OUTPATIENT
Start: 2024-04-15 | End: 2024-04-15

## 2024-04-15 RX ORDER — FENTANYL CITRATE 50 UG/ML
INJECTION, SOLUTION INTRAMUSCULAR; INTRAVENOUS
Status: DISCONTINUED | OUTPATIENT
Start: 2024-04-15 | End: 2024-04-15

## 2024-04-15 RX ORDER — DEXAMETHASONE SODIUM PHOSPHATE 4 MG/ML
INJECTION, SOLUTION INTRA-ARTICULAR; INTRALESIONAL; INTRAMUSCULAR; INTRAVENOUS; SOFT TISSUE
Status: DISCONTINUED | OUTPATIENT
Start: 2024-04-15 | End: 2024-04-15

## 2024-04-15 RX ORDER — HYDROMORPHONE HYDROCHLORIDE 1 MG/ML
0.2 INJECTION, SOLUTION INTRAMUSCULAR; INTRAVENOUS; SUBCUTANEOUS EVERY 5 MIN PRN
Status: DISCONTINUED | OUTPATIENT
Start: 2024-04-15 | End: 2024-04-15

## 2024-04-15 RX ORDER — FUROSEMIDE 80 MG/1
80 TABLET ORAL 2 TIMES DAILY
Status: DISCONTINUED | OUTPATIENT
Start: 2024-04-15 | End: 2024-04-16 | Stop reason: HOSPADM

## 2024-04-15 RX ORDER — PROTAMINE SULFATE 10 MG/ML
INJECTION, SOLUTION INTRAVENOUS
Status: DISCONTINUED | OUTPATIENT
Start: 2024-04-15 | End: 2024-04-15

## 2024-04-15 RX ORDER — ACETAMINOPHEN 325 MG/1
650 TABLET ORAL EVERY 4 HOURS PRN
Status: DISCONTINUED | OUTPATIENT
Start: 2024-04-15 | End: 2024-04-16 | Stop reason: HOSPADM

## 2024-04-15 RX ORDER — AMITRIPTYLINE HYDROCHLORIDE 25 MG/1
50 TABLET, FILM COATED ORAL NIGHTLY
Status: DISCONTINUED | OUTPATIENT
Start: 2024-04-15 | End: 2024-04-15

## 2024-04-15 RX ORDER — PROPOFOL 10 MG/ML
VIAL (ML) INTRAVENOUS
Status: DISCONTINUED | OUTPATIENT
Start: 2024-04-15 | End: 2024-04-15

## 2024-04-15 RX ORDER — METOPROLOL TARTRATE 25 MG/1
25 TABLET, FILM COATED ORAL 2 TIMES DAILY
Status: DISCONTINUED | OUTPATIENT
Start: 2024-04-15 | End: 2024-04-16 | Stop reason: HOSPADM

## 2024-04-15 RX ORDER — LIDOCAINE HYDROCHLORIDE 20 MG/ML
INJECTION INTRAVENOUS
Status: DISCONTINUED | OUTPATIENT
Start: 2024-04-15 | End: 2024-04-15

## 2024-04-15 RX ORDER — ROCURONIUM BROMIDE 10 MG/ML
INJECTION, SOLUTION INTRAVENOUS
Status: DISCONTINUED | OUTPATIENT
Start: 2024-04-15 | End: 2024-04-15

## 2024-04-15 RX ORDER — FENTANYL CITRATE 50 UG/ML
25 INJECTION, SOLUTION INTRAMUSCULAR; INTRAVENOUS EVERY 5 MIN PRN
Status: DISCONTINUED | OUTPATIENT
Start: 2024-04-15 | End: 2024-04-15

## 2024-04-15 RX ORDER — ONDANSETRON HYDROCHLORIDE 2 MG/ML
4 INJECTION, SOLUTION INTRAVENOUS ONCE AS NEEDED
Status: DISCONTINUED | OUTPATIENT
Start: 2024-04-15 | End: 2024-04-15

## 2024-04-15 RX ORDER — DIPHENHYDRAMINE HYDROCHLORIDE 50 MG/ML
25 INJECTION INTRAMUSCULAR; INTRAVENOUS EVERY 6 HOURS PRN
Status: DISCONTINUED | OUTPATIENT
Start: 2024-04-15 | End: 2024-04-15

## 2024-04-15 RX ORDER — QUETIAPINE FUMARATE 25 MG/1
50 TABLET, FILM COATED ORAL NIGHTLY
Status: DISCONTINUED | OUTPATIENT
Start: 2024-04-15 | End: 2024-04-15

## 2024-04-15 RX ORDER — ASPIRIN 81 MG/1
81 TABLET ORAL DAILY
Status: DISCONTINUED | OUTPATIENT
Start: 2024-04-16 | End: 2024-04-16 | Stop reason: HOSPADM

## 2024-04-15 RX ORDER — HEPARIN SODIUM 10000 [USP'U]/100ML
INJECTION, SOLUTION INTRAVENOUS CONTINUOUS PRN
Status: DISCONTINUED | OUTPATIENT
Start: 2024-04-15 | End: 2024-04-15

## 2024-04-15 RX ORDER — HEPARIN SODIUM 1000 [USP'U]/ML
INJECTION, SOLUTION INTRAVENOUS; SUBCUTANEOUS
Status: DISCONTINUED | OUTPATIENT
Start: 2024-04-15 | End: 2024-04-15

## 2024-04-15 RX ORDER — LIDOCAINE HYDROCHLORIDE 20 MG/ML
INJECTION, SOLUTION INFILTRATION; PERINEURAL
Status: DISCONTINUED | OUTPATIENT
Start: 2024-04-15 | End: 2024-04-15

## 2024-04-15 RX ORDER — HEPARIN SOD,PORCINE/0.9 % NACL 1000/500ML
INTRAVENOUS SOLUTION INTRAVENOUS
Status: DISCONTINUED | OUTPATIENT
Start: 2024-04-15 | End: 2024-04-15

## 2024-04-15 RX ADMIN — ROCURONIUM BROMIDE 10 MG: 10 INJECTION INTRAVENOUS at 03:04

## 2024-04-15 RX ADMIN — FUROSEMIDE 80 MG: 80 TABLET ORAL at 09:04

## 2024-04-15 RX ADMIN — DEXMEDETOMIDINE 4 MCG: 100 INJECTION, SOLUTION, CONCENTRATE INTRAVENOUS at 05:04

## 2024-04-15 RX ADMIN — HEPARIN SODIUM 4000 UNITS: 1000 INJECTION, SOLUTION INTRAVENOUS; SUBCUTANEOUS at 04:04

## 2024-04-15 RX ADMIN — SUGAMMADEX 400 MG: 100 INJECTION, SOLUTION INTRAVENOUS at 05:04

## 2024-04-15 RX ADMIN — PROPOFOL 150 MG: 10 INJECTION, EMULSION INTRAVENOUS at 01:04

## 2024-04-15 RX ADMIN — PHENYLEPHRINE HYDROCHLORIDE 0.2 MCG/KG/MIN: 10 INJECTION INTRAVENOUS at 02:04

## 2024-04-15 RX ADMIN — METOPROLOL TARTRATE 25 MG: 25 TABLET, FILM COATED ORAL at 09:04

## 2024-04-15 RX ADMIN — SODIUM CHLORIDE: 0.9 INJECTION, SOLUTION INTRAVENOUS at 01:04

## 2024-04-15 RX ADMIN — LIDOCAINE HYDROCHLORIDE 60 MG: 20 INJECTION INTRAVENOUS at 01:04

## 2024-04-15 RX ADMIN — PROTAMINE SULFATE 90 MG: 10 INJECTION, SOLUTION INTRAVENOUS at 04:04

## 2024-04-15 RX ADMIN — DEXAMETHASONE SODIUM PHOSPHATE 4 MG: 4 INJECTION, SOLUTION INTRAMUSCULAR; INTRAVENOUS at 02:04

## 2024-04-15 RX ADMIN — APIXABAN 5 MG: 5 TABLET, FILM COATED ORAL at 09:04

## 2024-04-15 RX ADMIN — ROCURONIUM BROMIDE 100 MG: 10 INJECTION INTRAVENOUS at 01:04

## 2024-04-15 RX ADMIN — ACETAMINOPHEN 650 MG: 325 TABLET ORAL at 09:04

## 2024-04-15 RX ADMIN — FENTANYL CITRATE 100 MCG: 50 INJECTION, SOLUTION INTRAMUSCULAR; INTRAVENOUS at 01:04

## 2024-04-15 RX ADMIN — HEPARIN SODIUM 6000 UNITS: 1000 INJECTION, SOLUTION INTRAVENOUS; SUBCUTANEOUS at 03:04

## 2024-04-15 RX ADMIN — ONDANSETRON 4 MG: 2 INJECTION INTRAMUSCULAR; INTRAVENOUS at 04:04

## 2024-04-15 RX ADMIN — HEPARIN SODIUM 18000 UNITS: 1000 INJECTION, SOLUTION INTRAVENOUS; SUBCUTANEOUS at 03:04

## 2024-04-15 RX ADMIN — HEPARIN SODIUM AND DEXTROSE 12 UNITS/KG/HR: 10000; 5 INJECTION INTRAVENOUS at 03:04

## 2024-04-15 RX ADMIN — HEPARIN SODIUM 3000 UNITS: 1000 INJECTION, SOLUTION INTRAVENOUS; SUBCUTANEOUS at 04:04

## 2024-04-15 RX ADMIN — ROCURONIUM BROMIDE 10 MG: 10 INJECTION INTRAVENOUS at 02:04

## 2024-04-15 RX ADMIN — PHENYLEPHRINE HYDROCHLORIDE 100 MCG: 10 INJECTION INTRAVENOUS at 02:04

## 2024-04-15 RX ADMIN — FUROSEMIDE 40 MG: 10 INJECTION, SOLUTION INTRAMUSCULAR; INTRAVENOUS at 05:04

## 2024-04-15 RX ADMIN — DEXMEDETOMIDINE 16 MCG: 100 INJECTION, SOLUTION, CONCENTRATE INTRAVENOUS at 01:04

## 2024-04-15 NOTE — H&P
[facility], [place]  Electrophysiology  H&P      Lucas Garrison   YOB: 1950   Medical Record Number: [id number]   Attending Physician:    Date of Admission: 04/15/2024       Hospital Day:  0  Current Principal Problem:  <principal problem not specified>      History     Cc: atrial fibrillation     HPI  Mr Lucas Garrison is a 73 year old male with PMH of atrial fibrillation s/p multiple cardioversions and symptomatic recurrence. He developed symptomatic bradycardia on flecainide. Ultimately had PVI 10/2023 with Dr. Marquez, did well for a time but then developed symptomatic recurrence. He was recently seen in EP clinic and offered PVI with Dr. Marquez, he agreed to proceed.       Presenting EKG: Atrial fibrillation   CHADS-VASc: 3 (HTN, HF, age)  Anticoagulants: Apixaban 5 mg BID   Antiarrhythmics: None   LV EF: 55% (NATIVIDAD 10/2023)  Pertinent labs: Hgb 15.1, INR 1.1, Cr 1.4        Medications - Outpatient  Prior to Admission medications    Medication Sig Start Date End Date Taking? Authorizing Provider   albuterol (PROVENTIL/VENTOLIN HFA) 90 mcg/actuation inhaler INHALE 2 PUFFS BY MOUTH FOUR TIMES A DAY AS NEEDED FOR BRONCHOSPASM PREVENTION 6/12/23  Yes Provider, Historical   amLODIPine (NORVASC) 10 MG tablet Take 1 tablet (10 mg total) by mouth once daily. 6/15/23  Yes Baldemar Davalos MD   apixaban (ELIQUIS) 5 mg Tab Take 1 tablet (5 mg total) by mouth 2 (two) times daily. 6/15/23  Yes Baldemar Davalos MD   aspirin (ECOTRIN) 81 MG EC tablet Take 81 mg by mouth once daily.   Yes Provider, Historical   bisoprolol (ZEBETA) 5 MG tablet Take 5 mg by mouth once daily.   Yes Provider, Historical   chlorhexidine (HIBICLENS) 4 % external liquid APPLY SMALL AMOUNT TOPICALLY EVERY DAY - USE TO WASH HAIR/SCALP EVERY DAY - THOROUGHLY RINSE THE AREA TO BE CLEANED WITH WATER. APPLY THE MINIMUM AMOUNT OF PRODUCT NECESSARY TO COVER THE SKIN  AND WASH GENTLY.RINSE AGAIN THOROUGHLY 10/19/22  Yes Provider,  Historical   clobetasoL (TEMOVATE) 0.05 % external solution APPLY MODERATE AMOUNT TOPICALLY TWICE A DAY AS NEEDED TO THE SCALP 10/19/22  Yes Provider, Historical   EScitalopram oxalate (LEXAPRO) 20 MG tablet Take 20 mg by mouth once daily.   Yes Provider, Historical   fish oil-omega-3 fatty acids 300-1,000 mg capsule Take 1 g by mouth once daily.   Yes Provider, Historical   furosemide (LASIX) 80 MG tablet Take 1 tablet (80 mg total) by mouth 2 (two) times daily. 7/27/23 7/26/24 Yes Baldemar Davalos MD   gabapentin (NEURONTIN) 600 MG tablet Take 600 mg by mouth.   Yes Provider, Historical   losartan (COZAAR) 100 MG tablet Take 1 tablet (100 mg total) by mouth once daily. 6/30/22  Yes Baldemar Davalos MD   mirtazapine (REMERON) 30 MG tablet Take 30 mg by mouth every evening.   Yes Provider, Historical   multivitamin capsule Take 1 capsule by mouth once daily.   Yes Provider, Historical   omeprazole (PRILOSEC) 20 MG capsule Take 20 mg by mouth once daily.   Yes Provider, Historical   pravastatin (PRAVACHOL) 80 MG tablet Take 1 tablet (80 mg total) by mouth once daily. 6/15/23  Yes Baldemar Davalos MD   prazosin (MINIPRESS) 5 MG capsule Take 5 mg by mouth 2 (two) times daily.   Yes Provider, Historical   zolpidem (AMBIEN) 5 MG Tab Take 1 tablet (5 mg total) by mouth nightly as needed (INSOMNIA).  Patient taking differently: Take 10 mg by mouth nightly as needed (INSOMNIA). 6/10/21 4/15/24 Yes Baldemar Davalos MD   amitriptyline (ELAVIL) 50 MG tablet Take 50 mg by mouth every evening.    Provider, Historical   loratadine (CLARITIN) 10 mg tablet Take 10 mg by mouth once daily.    Provider, Historical   methocarbamol (ROBAXIN) 750 MG Tab Take 500 mg by mouth 3 (three) times daily.    Provider, Historical   naproxen (NAPROSYN) 500 MG tablet Take 500 mg by mouth 2 (two) times daily with meals.    Provider, Historical   QUEtiapine (SEROQUEL) 50 MG tablet Take 50 mg by mouth nightly. One-half tablet    Provider,  Historical   traZODone (DESYREL) 50 MG tablet Take 50 mg by mouth every evening.    Provider, Historical         Medications - Current  Scheduled Meds:  No current facility-administered medications for this encounter.     Continuous Infusions:  No current facility-administered medications for this encounter.     PRN Meds:.  No current facility-administered medications for this encounter.         Allergies  Review of patient's allergies indicates:  No Known Allergies      Past Medical History  Past Medical History:   Diagnosis Date    atrial fibrillation     Cancer     DDD (degenerative disc disease)     H/O prostate cancer     HLD (hyperlipidemia)     Hypertension     Lumbago     REGINA (obstructive sleep apnea)     Peripheral neuropathy     PTSD (post-traumatic stress disorder)          Past Surgical History  Past Surgical History:   Procedure Laterality Date    ABLATION OF ARRHYTHMOGENIC FOCUS FOR ATRIAL FIBRILLATION N/A 10/26/2023    Procedure: Ablation atrial fibrillation;  Surgeon: Jacobo Marquez MD;  Location: [facility] EP LAB;  Service: Cardiology;  Laterality: N/A;  afib, PVI, RFA, NATIVIDAD (cx if SR), CHELSI, anes, MB, 3 Prep    CATHETERIZATION OF BOTH LEFT AND RIGHT HEART N/A 6/4/2021    Procedure: CATHETERIZATION, HEART, BOTH LEFT AND RIGHT;  Surgeon: Baldemar Davalos MD;  Location: [facility] CATH LAB;  Service: Cardiology;  Laterality: N/A;    COLONOSCOPY N/A 12/7/2016    Procedure: COLONOSCOPY;  Surgeon: Zeus Guerrier MD;  Location: [facility] ENDO;  Service: Endoscopy;  Laterality: N/A;    COLONOSCOPY N/A 2/22/2021    Procedure: COLONOSCOPY;  Surgeon: Joselyn Castanon MD;  Location: [facility] ENDO;  Service: Endoscopy;  Laterality: N/A;    CORONARY ANGIOGRAPHY N/A 6/4/2021    Procedure: ANGIOGRAM, CORONARY ARTERY;  Surgeon: Baldemar Davalos MD;  Location: [facility] CATH LAB;  Service: Cardiology;  Laterality: N/A;    ECHOCARDIOGRAM,TRANSESOPHAGEAL N/A 10/26/2023    Procedure: Transesophageal echo (NATIVIDAD) intra-procedure  log documentation;  Surgeon: Elisha Mac MD;  Location: [facility] EP LAB;  Service: Cardiology;  Laterality: N/A;    prostate radiation seeds      RIGHT HEART CATHETERIZATION Right 2021    Procedure: INSERTION, CATHETER, RIGHT HEART;  Surgeon: Baldemar Davalos MD;  Location: [facility] CATH LAB;  Service: Cardiology;  Laterality: Right;    TONSILLECTOMY      TRANSESOPHAGEAL ECHOCARDIOGRAM WITH POSSIBLE CARDIOVERSION (NATIVIDAD W/ POSS CARDIOVERSION) N/A 2023    Procedure: Transesophageal echo (NATIVIDAD) intra-procedure log documentation;  Surgeon: Baldemar Davalos MD;  Location: [facility] CATH LAB;  Service: Cardiology;  Laterality: N/A;    TRANSURETHRAL RESECTION OF PROSTATE      TREATMENT OF CARDIAC ARRHYTHMIA N/A 8/3/2023    Procedure: Cardioversion or Defibrillation;  Surgeon: Baldemar Davalos MD;  Location: [facility] CATH LAB;  Service: Cardiology;  Laterality: N/A;    TREATMENT OF CARDIAC ARRHYTHMIA  10/26/2023    Procedure: Cardioversion or Defibrillation;  Surgeon: Jacobo Marquez MD;  Location: [facility] EP LAB;  Service: Cardiology;;         Social History  Social History     Socioeconomic History    Marital status:    Tobacco Use    Smoking status: Former     Current packs/day: 0.00     Average packs/day: 1.5 packs/day for 10.0 years (15.0 ttl pk-yrs)     Types: Cigarettes     Start date: 10/30/1972     Quit date: 10/30/1982     Years since quittin.4    Smokeless tobacco: Never   Substance and Sexual Activity    Alcohol use: No    Drug use: No     Social Determinants of Health     Financial Resource Strain: Low Risk  (2024)    Overall Financial Resource Strain (CARDIA)     Difficulty of Paying Living Expenses: Not very hard   Food Insecurity: No Food Insecurity (2024)    Hunger Vital Sign     Worried About Running Out of Food in the Last Year: Never true     Ran Out of Food in the Last Year: Never true   Transportation Needs: No Transportation Needs (2024)    PRAPARE - Transportation      "Lack of Transportation (Medical): No     Lack of Transportation (Non-Medical): No   Physical Activity: Insufficiently Active (1/8/2024)    Exercise Vital Sign     Days of Exercise per Week: 3 days     Minutes of Exercise per Session: 30 min   Stress: Stress Concern Present (1/8/2024)    [employer] - Occupational Stress Questionnaire     Feeling of Stress : To some extent   Social Connections: Unknown (1/8/2024)    Social Connection and Isolation Panel [NHANES]     Frequency of Communication with Friends and Family: Once a week     Frequency of Social Gatherings with Friends and Family: Once a week     Active Member of Clubs or Organizations: Yes     Attends Club or Organization Meetings: 1 to 4 times per year     Marital Status:    Housing Stability: Low Risk  (1/8/2024)    Housing Stability Vital Sign     Unable to Pay for Housing in the Last Year: No     Number of Places Lived in the Last Year: 1     Unstable Housing in the Last Year: No         ROS  10 point ROS performed and negative except as stated in HPI     Physical Examination         Vital Signs  Vitals  Temp: 97.9 °F (36.6 °C)  Temp Source: Temporal  Pulse: (!) 124  Resp: 18  SpO2: 95 %  BP: 130/83  MAP (mmHg): 103  BP Location: Left arm  BP Method: Automatic  Patient Position: Lying          24 Hour VS Range    Temp:  [97.9 °F (36.6 °C)]   Pulse:  [124]   Resp:  [18]   BP: (129-130)/(83-86)   SpO2:  [95 %]   No intake or output data in the 24 hours ending 04/15/24 1322        Physical Exam:   Constitutional: no acute distress  HEENT: NCAT, EOMI, no scleral icterus  Cardiovascular: Irregularly irregular rhythm   Pulmonary: Normal respiratory effort   Abdomen: nontender, non-distended   Neuro: alert and oriented, no focal deficits  Extremities: warm, no edema   MSK: no deformities  Integument: intact, no rashes       Data       No results for input(s): "WBC", "HGB", "HCT", "PLT" in the last 168 hours.     No results for " "input(s): "PROTIME", "INR" in the last 168 hours.     No results for input(s): "NA", "K", "CL", "CO2", "BUN", "CREATININE", "ANIONGAP", "CALCIUM" in the last 168 hours.     No results for input(s): "PROT", "ALBUMIN", "BILITOT", "ALKPHOS", "AST", "ALT" in the last 168 hours.     No results for input(s): "TROPONINI" in the last 168 hours.     BNP   Date Value   06/15/2023 149 pg/mL (H)   07/24/2021 127 PG/ML       No results for input(s): "LABBLOO" in the last 168 hours.         Assessment & Plan   73 year old male with PMH of atrial fibrillation s/p multiple cardioversions and symptomatic recurrence who [place] [facility] [place] Dr. Marquez.     Atrial fibrillation:   Risks/benefits/alternatives discussed with patient and he agrees to proceed. Consents signed.   -To EP lab for redo PVI with Dr. Marquez  -NATIVIDAD prior to rule out CASSANDRA thrombus         Geovany Duffy MD  [facility]   Cardiovascular Disease PGY-VI     "

## 2024-04-15 NOTE — BRIEF OP NOTE
: Jacobo Marquez MD  Date of Procedure: 04/15/2024    Post-operative Diagnosis: AF    Procedure Performed: Pulmonary vein isolation of all 4 pulmonary veins via radiofrequency ablation.     Description of Procedure: The patient was brought to the EP lab in the fasting state. Prepped and draped in sterile fashion. Safety timeout was performed. Sedation administered by anesthesia staff. Ultrasound guided venous access of the bilateral femoral veins was performed. ICE and CS catheters placed via left femoral vein access. Long sheaths via right femoral venous access. Heparin bolus and continuous infusion per protocol. RFA to CTI with confirmation of bidirectional block. Two transseptal punctures performed using combination of fluoroscopic and ICE guidance. Map created demonstrating reconnection of left pulmonary veins, persistent isolation of right veins. RFA to re isolate left pulmonary veins and posterior wall. ICE confirmed no significant pericardial effusion.     EBL: <10 mL    Specimens: none  Complications: no immediate    Plan:  Bedrest x 6 hrs -- ends at 11 PM   ECG on arrival to recovery  Patient to resume OAC this evening. If bleeding or hematoma formation, please notify EP service before holding OAC  PPI x 1 month  Admit to observation overnight. Will be evaluated by EP in the AM.       Geovany Duffy MD  [facility]  Cardiovascular Disease, PGY-VI

## 2024-04-15 NOTE — ANESTHESIA PROCEDURE NOTES
Arterial    Diagnosis: afif    Patient location during procedure: done in OR    Staffing  Authorizing Provider: Nacho Thomas MD  Performing Provider: Nacho Thomas MD    Staffing  Performed by: Nacho Thomas MD  Authorized by: Nacho Thomas MD    Anesthesiologist was present at the time of the procedure.  Arterial  Skin Prep: chlorhexidine gluconate  Local Infiltration: none  Orientation: right  Location: radial    Catheter Size: 20 G  Catheter placement by Ultrasound guidance. Heme positive aspiration all ports.   Vessel Caliber: patent  Needle advanced into vessel with real time Ultrasound guidance.Insertion Attempts: 1  Assessment  Dressing: secured with tape and tegaderm

## 2024-04-15 NOTE — ANESTHESIA PREPROCEDURE EVALUATION
04/15/2024  Lucas Garrison is a 73 y.o., male.      Pre-op Assessment    I have reviewed the Patient Summary Reports.       I have reviewed the Medications.     Review of Systems  Anesthesia Hx:  No problems with previous Anesthesia               Denies Personal Hx of Anesthesia complications.                        Physical Exam    Airway:  No airway management difficulties anticipated  Dental:  No active dental issues noted  Chest/Lungs:  Clear to auscultation    Heart:  Rate: Normal  Rhythm: Regular Rhythm  Sounds: Normal        Anesthesia Plan  Type of Anesthesia, risks & benefits discussed:    Anesthesia Type: Gen ETT  Intra-op Monitoring Plan: Art Line  Informed Consent: Informed consent signed with the Patient and all parties understand the risks and agree with anesthesia plan.  All questions answered.   ASA Score: 3  Anesthesia Plan Notes: Chart reviewed. Patient seen and examined. Anesthesia plan discussed and questions answered. E-consent signed. Nacho Thomas MD    Ready For Surgery From Anesthesia Perspective.     .

## 2024-04-15 NOTE — ANESTHESIA PROCEDURE NOTES
Intubation    Date/Time: 4/15/2024 2:00 PM    Performed by: Declan Vallejo CRNA  Authorized by: Nacho Thomas MD    Intubation:     Induction:  Intravenous    Intubated:  Postinduction    Mask Ventilation:  Easy with oral airway    Attempts:  1    Attempted By:  CRNA    Method of Intubation:  Video laryngoscopy    Blade:  Burrows 3    Laryngeal View Grade: Grade IIA - cords partially seen      Difficult Airway Encountered?: No      Complications:  None    Airway Device:  Oral endotracheal tube    Airway Device Size:  8.0    Style/Cuff Inflation:  Cuffed (inflated to minimal occlusive pressure)    Tube secured:  23    Secured at:  The teeth    Placement Verified By:  Capnometry    Complicating Factors:  None    Findings Post-Intubation:  BS equal bilateral and atraumatic/condition of teeth unchanged

## 2024-04-15 NOTE — CONSULTS
[facility], [place]  NATIVIDAD Consult      Lucas MARIA May  YOB: 1950  Medical Record Number:  [id number]  Attending Physician:  Jacobo Marquez MD   Current Principal Problem:  <principal problem not specified>    History     Cc:     HPI  72 y.o. male who presents for PVI by Dr. Zavala.     He developed symptomatic persistent AF leading to NATIVIDAD/CV twice in August 2023. He had NATIVIDAD/CV 4/21. NSR 5/21, AF noted 6/23. EF normal. CVA prophylaxis with xarelto. He has been on amiodarone in the past (4-6/21). He was placed on flecainide a few months ago and required increased dose due to breakthrough AF event which has resulted in symptomatic bradycardia.   Underwent NATIVIDAD/DCCV on 10/26/24 however reverted back to Afib after.   Today presents for PVI.  No CI noted. Compliant with AC.    Anticoagulant/antiplatelets: eliquis, compliant  ECG: AFIB with rate     Dysphagia or odynophagia:  No  Liver Disease, esophageal disease, or known varices:  No  Upper GI Bleeding: No  Snoring:  No  Sleep Apnea:  No  Prior neck surgery or radiation:  No  History of anesthetic difficulties:  No  Family history of anesthetic difficulties:  No  Last oral intake: yesterday before midnight  Able to move neck in all directions:  Yes    Medications - Outpatient  Prior to Admission medications    Medication Sig Start Date End Date Taking? Authorizing Provider   albuterol (PROVENTIL/VENTOLIN HFA) 90 mcg/actuation inhaler INHALE 2 PUFFS BY MOUTH FOUR TIMES A DAY AS NEEDED FOR BRONCHOSPASM PREVENTION 6/12/23  Yes Provider, Historical   amLODIPine (NORVASC) 10 MG tablet Take 1 tablet (10 mg total) by mouth once daily. 6/15/23  Yes Baldemar Davalos MD   apixaban (ELIQUIS) 5 mg Tab Take 1 tablet (5 mg total) by mouth 2 (two) times daily. 6/15/23  Yes Baldemar Davalos MD   aspirin (ECOTRIN) 81 MG EC tablet Take 81 mg by mouth once daily.   Yes Provider, Historical   bisoprolol (ZEBETA) 5 MG tablet Take 5 mg by mouth once daily.    Yes Provider, Historical   chlorhexidine (HIBICLENS) 4 % external liquid APPLY SMALL AMOUNT TOPICALLY EVERY DAY - USE TO WASH HAIR/SCALP EVERY DAY - THOROUGHLY RINSE THE AREA TO BE CLEANED WITH WATER. APPLY THE MINIMUM AMOUNT OF PRODUCT NECESSARY TO COVER THE SKIN  AND WASH GENTLY.RINSE AGAIN THOROUGHLY 10/19/22  Yes Provider, Historical   clobetasoL (TEMOVATE) 0.05 % external solution APPLY MODERATE AMOUNT TOPICALLY TWICE A DAY AS NEEDED TO THE SCALP 10/19/22  Yes Provider, Historical   EScitalopram oxalate (LEXAPRO) 20 MG tablet Take 20 mg by mouth once daily.   Yes Provider, Historical   fish oil-omega-3 fatty acids 300-1,000 mg capsule Take 1 g by mouth once daily.   Yes Provider, Historical   furosemide (LASIX) 80 MG tablet Take 1 tablet (80 mg total) by mouth 2 (two) times daily. 7/27/23 7/26/24 Yes Baldemar Davalos MD   gabapentin (NEURONTIN) 600 MG tablet Take 600 mg by mouth.   Yes Provider, Historical   losartan (COZAAR) 100 MG tablet Take 1 tablet (100 mg total) by mouth once daily. 6/30/22  Yes Baldemar Davalos MD   mirtazapine (REMERON) 30 MG tablet Take 30 mg by mouth every evening.   Yes Provider, Historical   multivitamin capsule Take 1 capsule by mouth once daily.   Yes Provider, Historical   omeprazole (PRILOSEC) 20 MG capsule Take 20 mg by mouth once daily.   Yes Provider, Historical   pravastatin (PRAVACHOL) 80 MG tablet Take 1 tablet (80 mg total) by mouth once daily. 6/15/23  Yes Baldemar Davalos MD   prazosin (MINIPRESS) 5 MG capsule Take 5 mg by mouth 2 (two) times daily.   Yes Provider, Historical   zolpidem (AMBIEN) 5 MG Tab Take 1 tablet (5 mg total) by mouth nightly as needed (INSOMNIA).  Patient taking differently: Take 10 mg by mouth nightly as needed (INSOMNIA). 6/10/21 4/15/24 Yes Baldemar Davalos MD   amitriptyline (ELAVIL) 50 MG tablet Take 50 mg by mouth every evening.    Provider, Historical   loratadine (CLARITIN) 10 mg tablet Take 10 mg by mouth once daily.    Provider,  Historical   methocarbamol (ROBAXIN) 750 MG Tab Take 500 mg by mouth 3 (three) times daily.    Provider, Historical   naproxen (NAPROSYN) 500 MG tablet Take 500 mg by mouth 2 (two) times daily with meals.    Provider, Historical   QUEtiapine (SEROQUEL) 50 MG tablet Take 50 mg by mouth nightly. One-half tablet    Provider, Historical   traZODone (DESYREL) 50 MG tablet Take 50 mg by mouth every evening.    Provider, Historical       Medications - Current  Scheduled Meds:  No current facility-administered medications for this encounter.     Continuous Infusions:  No current facility-administered medications for this encounter.       Allergies  Review of patient's allergies indicates:  No Known Allergies  Past Medical History  Past Medical History:   Diagnosis Date    atrial fibrillation     Cancer     DDD (degenerative disc disease)     H/O prostate cancer     HLD (hyperlipidemia)     Hypertension     Lumbago     REGINA (obstructive sleep apnea)     Peripheral neuropathy     PTSD (post-traumatic stress disorder)      Past Surgical History  Past Surgical History:   Procedure Laterality Date    ABLATION OF ARRHYTHMOGENIC FOCUS FOR ATRIAL FIBRILLATION N/A 10/26/2023    Procedure: Ablation atrial fibrillation;  Surgeon: Jacobo Marquez MD;  Location: [facility] EP LAB;  Service: Cardiology;  Laterality: N/A;  afib, PVI, RFA, NATIVIDAD (cx if SR), CHELSI, anes, MB, 3 Prep    CATHETERIZATION OF BOTH LEFT AND RIGHT HEART N/A 6/4/2021    Procedure: CATHETERIZATION, HEART, BOTH LEFT AND RIGHT;  Surgeon: Baldemar Davalos MD;  Location: [facility] CATH LAB;  Service: Cardiology;  Laterality: N/A;    COLONOSCOPY N/A 12/7/2016    Procedure: COLONOSCOPY;  Surgeon: Zesu Guerrier MD;  Location: [facility] ENDO;  Service: Endoscopy;  Laterality: N/A;    COLONOSCOPY N/A 2/22/2021    Procedure: COLONOSCOPY;  Surgeon: Joselyn Castanon MD;  Location: [facility] ENDO;  Service: Endoscopy;  Laterality: N/A;    CORONARY ANGIOGRAPHY N/A 6/4/2021    Procedure:  ANGIOGRAM, CORONARY ARTERY;  Surgeon: Baldemar Davalos MD;  Location: [facility] CATH LAB;  Service: Cardiology;  Laterality: N/A;    ECHOCARDIOGRAM,TRANSESOPHAGEAL N/A 10/26/2023    Procedure: Transesophageal echo (NATIVIDAD) intra-procedure log documentation;  Surgeon: Elisha Mac MD;  Location: [facility] EP LAB;  Service: Cardiology;  Laterality: N/A;    prostate radiation seeds  2010    RIGHT HEART CATHETERIZATION Right 6/4/2021    Procedure: INSERTION, CATHETER, RIGHT HEART;  Surgeon: Baldemar Davalos MD;  Location: [facility] CATH LAB;  Service: Cardiology;  Laterality: Right;    TONSILLECTOMY      TRANSESOPHAGEAL ECHOCARDIOGRAM WITH POSSIBLE CARDIOVERSION (NATIVIDAD W/ POSS CARDIOVERSION) N/A 6/20/2023    Procedure: Transesophageal echo (NATIVIDAD) intra-procedure log documentation;  Surgeon: Baldemar Davalos MD;  Location: [facility] CATH LAB;  Service: Cardiology;  Laterality: N/A;    TRANSURETHRAL RESECTION OF PROSTATE      TREATMENT OF CARDIAC ARRHYTHMIA N/A 8/3/2023    Procedure: Cardioversion or Defibrillation;  Surgeon: Baldemar Davalos MD;  Location: [facility] CATH LAB;  Service: Cardiology;  Laterality: N/A;    TREATMENT OF CARDIAC ARRHYTHMIA  10/26/2023    Procedure: Cardioversion or Defibrillation;  Surgeon: Jacobo Marquez MD;  Location: [facility] EP LAB;  Service: Cardiology;;     ROS  10 point ROS performed and negative except as stated in HPI     Physical Examination   Vital Signs  Vitals  Temp: 97.9 °F (36.6 °C)  Temp Source: Temporal  Pulse: (!) 124  Resp: 18  SpO2: 95 %  BP: 130/83  MAP (mmHg): 103  BP Location: Left arm  BP Method: Automatic  Patient Position: Lying    24 Hour VS Range  Temp:  [97.9 °F (36.6 °C)]   Pulse:  [124]   Resp:  [18]   BP: (129-130)/(83-86)   SpO2:  [95 %]   No intake or output data in the 24 hours ending 04/15/24 2409      Physical Exam:   Constitutional: no acute distress  HEENT: NCAT, EOMI, no scleral icterus  Cardiovascular: Regular rate and rhythm  Pulmonary: Normal respiratory effort  "  Abdomen: nontender, non-distended   Neuro: alert and oriented, no focal deficits  Extremities: warm, no edema   MSK: no deformities  Skin: intact, no rashes     Data   No results for input(s): "WBC", "HGB", "HCT", "PLT" in the last 168 hours.   No results for input(s): "PROTIME", "INR" in the last 168 hours.   No results for input(s): "NA", "K", "CL", "CO2", "BUN", "CREATININE", "ANIONGAP", "CALCIUM" in the last 168 hours.   Assessment & Plan     NATIVIDAD for CASSANDRA assessment prior to ablation  -No absolute contraindications of esophageal stricture, tumor, perforation, laceration,or diverticulum and/or active GI bleed  -The risks, benefits & alternatives of the procedure were explained to the patient.   -The risks of transesophageal echo include but are not limited to:  Dental trauma, esophageal trauma/perforation, bleeding, laryngospasm/brochospasm, aspiration, sore throat/hoarseness, & dislodgement of the endotracheal tube/nasogastric tube (where applicable).    -The risks of ANES monitored sedation include hypotension, respiratory depression, arrhythmias, bronchospasm, & death.    -Informed consent was obtained. The patient is agreeable to proceed with the procedure and all questions and concerns addressed.    Case discussed with an attending in echocardiography lab.    Khanh Parker MD  [facility]   Cardiovascular Disease PGY-V    "

## 2024-04-15 NOTE — TRANSFER OF CARE
"Anesthesia Transfer of Care Note    Patient: Lucas Garrison    Procedure(s) Performed: Procedure(s) (LRB):  Ablation atrial fibrillation (N/A)  Transesophageal echo (NATIVIDAD) intra-procedure log documentation (N/A)  Ablation, Atrial Flutter, Typical  Cardioversion or Defibrillation    Patient location: PACU    Anesthesia Type: general    Transport from OR: Transported from OR on 6-10 L/min O2 by face mask with adequate spontaneous ventilation    Post pain: adequate analgesia    Post assessment: no apparent anesthetic complications and tolerated procedure well    Post vital signs: stable    Level of consciousness: awake, alert and oriented    Nausea/Vomiting: no nausea/vomiting    Complications: none    Transfer of care protocol was followed      Last vitals: Visit Vitals  /83 (BP Location: Left arm, Patient Position: Lying)   Pulse (!) 124   Temp 36.6 °C (97.9 °F) (Temporal)   Resp 18   Ht 5' 11" (1.803 m)   Wt 136.1 kg (300 lb)   SpO2 95%   BMI 41.84 kg/m²     "

## 2024-04-16 ENCOUNTER — DOCUMENTATION ONLY (OUTPATIENT)
Dept: CARDIOLOGY | Facility: CLINIC | Age: 74
End: 2024-04-16
Payer: MEDICARE

## 2024-04-16 VITALS
WEIGHT: 300.69 LBS | DIASTOLIC BLOOD PRESSURE: 73 MMHG | SYSTOLIC BLOOD PRESSURE: 132 MMHG | RESPIRATION RATE: 20 BRPM | HEART RATE: 57 BPM | OXYGEN SATURATION: 93 % | HEIGHT: 71 IN | BODY MASS INDEX: 42.1 KG/M2 | TEMPERATURE: 98 F

## 2024-04-16 LAB
OHS QRS DURATION: 94 MS
OHS QRS DURATION: 96 MS
OHS QTC CALCULATION: 413 MS
OHS QTC CALCULATION: 429 MS

## 2024-04-16 PROCEDURE — 25000003 PHARM REV CODE 250: Performed by: INTERNAL MEDICINE

## 2024-04-16 PROCEDURE — 93005 ELECTROCARDIOGRAM TRACING: CPT | Mod: 59

## 2024-04-16 PROCEDURE — 93010 ELECTROCARDIOGRAM REPORT: CPT | Mod: ,,, | Performed by: INTERNAL MEDICINE

## 2024-04-16 RX ORDER — PANTOPRAZOLE SODIUM 40 MG/1
40 TABLET, DELAYED RELEASE ORAL DAILY
Status: DISCONTINUED | OUTPATIENT
Start: 2024-04-16 | End: 2024-04-16 | Stop reason: HOSPADM

## 2024-04-16 RX ADMIN — FUROSEMIDE 80 MG: 80 TABLET ORAL at 09:04

## 2024-04-16 RX ADMIN — ASPIRIN 81 MG: 81 TABLET, COATED ORAL at 09:04

## 2024-04-16 RX ADMIN — APIXABAN 5 MG: 5 TABLET, FILM COATED ORAL at 09:04

## 2024-04-16 RX ADMIN — ESCITALOPRAM OXALATE 20 MG: 20 TABLET ORAL at 09:04

## 2024-04-16 RX ADMIN — PANTOPRAZOLE SODIUM 40 MG: 40 TABLET, DELAYED RELEASE ORAL at 09:04

## 2024-04-16 RX ADMIN — METOPROLOL TARTRATE 25 MG: 25 TABLET, FILM COATED ORAL at 09:04

## 2024-04-16 NOTE — NURSING
1100-Pt discharge per md orders. Pt verbalize complete understanding and follow up appointment. Pt was given a copy of home care d/c instructions w/ a list of home medications.  Address all issues prior d/c . Pt left w/ all of____ valuables. Pt voice no concerns. Transport notified of discharge. Tele box place in dirty bin by .

## 2024-04-16 NOTE — NURSING TRANSFER
Nursing Transfer Note      Reason patient is being transferred: PACU    Transfer To:     Transfer via stretcher    Transfer with cardiac monitoring    Transported by Patient Transport    Medicines sent: None    Any special needs or follow-up needed: Suture Time Removal at 2100; HOB to 30 degrees at 2100; Bedrest Complete at 2300    Chart send with patient: Yes    Notified: spouse    Patient reassessed at: 4/15/2024 1934 (date, time)

## 2024-04-16 NOTE — NURSING
Patient received via stretcher to 356 accompanied by PACU RN.  Telemetry in place.  Patient is flat and bilateral groin sites without bleeding or hematomas noted.  Amos catheter in place.  Frequent VS in progress.  Patient without complaints and spouse at bedside.  Oriented to room and call bell.  Initial assessment completed and care plan initiated.  Nurses Note -- 4 Eyes      4/15/2024   8:35 PM      Skin assessed during: Transfer      [x] No Altered Skin Integrity Present    []Prevention Measures Documented      [] Yes- Altered Skin Integrity Present or Discovered   [] LDA Added if Not in Epic (Describe Wound)   [] New Altered Skin Integrity was Present on Admit and Documented in LDA   [] Wound Image Taken    Wound Care Consulted? No    Attending Nurse:  Marely Jaffe RN     Second RN/Staff Member:  CASEY Álvarez

## 2024-04-16 NOTE — PROGRESS NOTES
Heart Failure Transitional Care Clinic (HFTCC) Team notified of pt referral via Ambulatory Referral to Heart Failure Transitional Care ([id number]).    Patient screened today 04/16/2024 by provider and RN for enrollment to program.      Pt was deemed not a candidate for enrollment at this time related to patient current admission diagnosis/ problem will not benefit from the Heart Failure Transitional Care Program.    Pt will require additional follow up planning per primary team.     If pt status, diagnosis, or treatment plan changes , please place AMB referral to Heart Failure Transitional Care Clinic ([id number]) for HFTCC enrollment re-evalution.

## 2024-04-16 NOTE — PLAN OF CARE
Patient cooperative and pleasant with routine care and procedures.  Bilateral groins sites with sutures removed with no bleeding or hematoma.  Patient ambulated in halls with steady gait and no c/o weakness or dizziness.  Patient requested to keep kraus til am.  Plan to remove kraus at 0400.  Resting quietly and without complaints.  Will continue to monitor.

## 2024-04-16 NOTE — ANESTHESIA POSTPROCEDURE EVALUATION
Anesthesia Post Evaluation    Patient: Lucas Garrison    Procedure(s) Performed: Procedure(s) (LRB):  Ablation atrial fibrillation (N/A)  Transesophageal echo (NATIVIDAD) intra-procedure log documentation (N/A)  Ablation, Atrial Flutter, Typical  Cardioversion or Defibrillation    Final Anesthesia Type: general      Patient location during evaluation: PACU  Patient participation: Yes- Able to Participate  Level of consciousness: awake and alert  Post-procedure vital signs: reviewed and stable  Pain management: adequate  Airway patency: patent    PONV status at discharge: No PONV  Anesthetic complications: no      Cardiovascular status: blood pressure returned to baseline  Respiratory status: unassisted  Hydration status: euvolemic  Follow-up not needed.              Vitals Value Taken Time   /84 04/16/24 0743   Temp 36.7 °C (98.1 °F) 04/16/24 0743   Pulse 64 04/16/24 0900   Resp 18 04/16/24 0743   SpO2 93 % 04/16/24 0743         No case tracking events are documented in the log.      Pain/Jay Score: Pain Rating Prior to Med Admin: 3 (4/15/2024  9:35 PM)  Pain Rating Post Med Admin: 0 (4/15/2024 10:35 PM)  Jay Score: 10 (4/15/2024  6:00 PM)

## 2024-04-16 NOTE — DISCHARGE SUMMARY
[facility] - Cardiology  Cardiac Electrophysiology  Discharge Summary        Patient Name: Lucas Garrison   MRN: [id number]   Admission Date: 4/15/2024    Hospital Length of Stay: 0   Discharge Date: 04/16/2024    Attending Physician: Jacobo Marquez MD   Discharging Provider: Geovany Duffy MD      HPI:   Mr Lucas Garrison is a 73 year old male with PMH of atrial fibrillation s/p multiple cardioversions and symptomatic recurrence. He developed symptomatic bradycardia on flecainide. Ultimately had PVI 10/2023 with Dr. Marquez, did well for a time but then developed symptomatic recurrence. He was recently seen in EP clinic and offered redo PVI with Dr. Marquez, he agreed to proceed.         Presenting EKG: Atrial fibrillation   CHADS-VASc: 3 (HTN, HF, age)  Anticoagulants: Apixaban 5 mg BID   Antiarrhythmics: None   LV EF: 55% (NATIVIDAD 10/2023)  Pertinent labs: Hgb 15.1, INR 1.1, Cr 1.4    Hospital Course:   Mr Garrison underwent successful CTI ablation and re isolation of left sided pulmonary veins, as well as posterior wall isolation. He tolerated the procedure well with no immediate complications. He completed 6 hours of bed rest and ambulated without evidence of bleeding or hematoma. He was monitored overnight and discharged home in stable condition the morning following procedure.     Follow up:   Follow up with Dr. Marquez in 3 months     Disposition:   Home or Self Care         Medication List        CHANGE how you take these medications      zolpidem 5 MG Tab  Commonly known as: AMBIEN  Take 1 tablet (5 mg total) by mouth nightly as needed (INSOMNIA).  What changed: how much to take            CONTINUE taking these medications      albuterol 90 mcg/actuation inhaler  Commonly known as: PROVENTIL/VENTOLIN HFA     amitriptyline 50 MG tablet  Commonly known as: ELAVIL     amLODIPine 10 MG tablet  Commonly known as: NORVASC  Take 1 tablet (10 mg total) by mouth once daily.     apixaban 5 mg Tab  Commonly known as: ELIQUIS  Take 1  tablet (5 mg total) by mouth 2 (two) times daily.     aspirin 81 MG EC tablet  Commonly known as: ECOTRIN     bisoprolol 5 MG tablet  Commonly known as: ZEBETA     chlorhexidine 4 % external liquid  Commonly known as: HIBICLENS     clobetasoL 0.05 % external solution  Commonly known as: TEMOVATE     EScitalopram oxalate 20 MG tablet  Commonly known as: LEXAPRO     fish oil-omega-3 fatty acids 300-1,000 mg capsule     furosemide 80 MG tablet  Commonly known as: LASIX  Take 1 tablet (80 mg total) by mouth 2 (two) times daily.     gabapentin 600 MG tablet  Commonly known as: NEURONTIN     loratadine 10 mg tablet  Commonly known as: CLARITIN     losartan 100 MG tablet  Commonly known as: COZAAR  Take 1 tablet (100 mg total) by mouth once daily.     methocarbamoL 750 MG Tab  Commonly known as: ROBAXIN     mirtazapine 30 MG tablet  Commonly known as: REMERON     multivitamin capsule     naproxen 500 MG tablet  Commonly known as: NAPROSYN     omeprazole 20 MG capsule  Commonly known as: PRILOSEC     pravastatin 80 MG tablet  Commonly known as: PRAVACHOL  Take 1 tablet (80 mg total) by mouth once daily.     prazosin 5 MG capsule  Commonly known as: MINIPRESS     QUEtiapine 50 MG tablet  Commonly known as: SEROQUEL     traZODone 50 MG tablet  Commonly known as: MIRIAN Duffy MD  [facility]  Cardiovascular Disease, PGY-VI

## 2024-06-25 DIAGNOSIS — I48.0 PAROXYSMAL ATRIAL FIBRILLATION: Primary | ICD-10-CM

## 2024-06-26 ENCOUNTER — OFFICE VISIT (OUTPATIENT)
Dept: CARDIOLOGY | Facility: CLINIC | Age: 74
End: 2024-06-26
Payer: MEDICARE

## 2024-06-26 ENCOUNTER — HOSPITAL ENCOUNTER (OUTPATIENT)
Dept: CARDIOLOGY | Facility: HOSPITAL | Age: 74
Discharge: HOME | End: 2024-06-26
Attending: INTERNAL MEDICINE
Payer: MEDICARE

## 2024-06-26 VITALS
WEIGHT: 306.44 LBS | SYSTOLIC BLOOD PRESSURE: 130 MMHG | DIASTOLIC BLOOD PRESSURE: 78 MMHG | BODY MASS INDEX: 42.74 KG/M2 | OXYGEN SATURATION: 95 % | HEART RATE: 78 BPM

## 2024-06-26 DIAGNOSIS — I48.0 PAROXYSMAL ATRIAL FIBRILLATION: Primary | ICD-10-CM

## 2024-06-26 DIAGNOSIS — I48.0 PAROXYSMAL ATRIAL FIBRILLATION: ICD-10-CM

## 2024-06-26 LAB
OHS QRS DURATION: 90 MS
OHS QTC CALCULATION: 427 MS

## 2024-06-26 PROCEDURE — 99214 OFFICE O/P EST MOD 30 MIN: CPT | Mod: S$PBB,,, | Performed by: INTERNAL MEDICINE

## 2024-06-26 PROCEDURE — 99213 OFFICE O/P EST LOW 20 MIN: CPT | Mod: PBBFAC | Performed by: INTERNAL MEDICINE

## 2024-06-26 PROCEDURE — 93005 ELECTROCARDIOGRAM TRACING: CPT

## 2024-06-26 PROCEDURE — 99999 PR PBB SHADOW E&M-EST. PATIENT-LVL III: CPT | Mod: PBBFAC,,, | Performed by: INTERNAL MEDICINE

## 2024-06-26 PROCEDURE — 93010 ELECTROCARDIOGRAM REPORT: CPT | Mod: ,,, | Performed by: INTERNAL MEDICINE

## 2024-06-26 NOTE — PROGRESS NOTES
Subjective   Patient ID:  Lucas Garrison is a 73 y.o. male who presents for follow-up of Atrial Fibrillation      73 yoM here for arrhythmia management.     9/23: He developed symptomatic persistent AF leading to NATIVIDAD/CV twice in August 2023. He had NATIVIDAD/CV 4/21. NSR 5/21, AF noted 6/23. EF normal. CVA prophylaxis with xarelto. He has been on amiodarone in the past (4-6/21). He was placed on flecainide a few months ago and required increased dose due to breakthrough AF event which has resulted in symptomatic bradycardia.     1/24: He felt great for 1-2 months then he had return of AF symptoms. Back in AF today.     Interval history: Repeat ablation 4/15/24. ECG shows SR with very small P waves    Re-do ablation 4/15/24:     ·  Cardioversion was successfully performed with restoration of normal sinus rhythm.  ·  CTI ablation.  ·  Pulmonary vein re-isolation of LPVs, RPVs isolated at baseline  ·  Posterior wall partial isolation with left lower quadrant isolation limited by esophageal heating  ·  3D mapping performed with Ensite.  ·  Intracardiac echo.  ·  Challenging CS positioning    PVI 10/26/23:    ·  3D mapping performed with Ensite.  ·  Intracardiac echo.  ·  Pulmonary vein isolation.    NATIVIDAD 8/23:  · The left ventricle is normal in size with normal systolic function.  · Normal right ventricular size with normal right ventricular systolic function.  · Normal central venous pressure (3 mmHg).  · Atrial fibrillation observed.  · Normal appearing left atrial appendage. No thrombus is present in the appendage.  · Mild mitral regurgitation.  · Mild tricuspid regurgitation.  · The estimated ejection fraction is 60%.  · A 200 J synchronized cardioversion was successfully performed with restoration of normal sinus rhythm.     My interpretation of the ECG is:    SR with nl NJ, QRS (very faint P waves)    Past Medical History:  No date: atrial fibrillation  No date: Cancer  No date: DDD (degenerative disc disease)  No date:  H/O prostate cancer  No date: HLD (hyperlipidemia)  No date: Hypertension  No date: Lumbago  No date: REGINA (obstructive sleep apnea)  No date: Peripheral neuropathy  No date: PTSD (post-traumatic stress disorder)    Past Surgical History:  10/26/2023: ABLATION OF ARRHYTHMOGENIC FOCUS FOR ATRIAL FIBRILLATION;   N/A      Comment:  Procedure: Ablation atrial fibrillation;  Surgeon:                Jacobo Marquez MD;  Location: [facility] EP LAB;                 Service: Cardiology;  Laterality: N/A;  afib, PVI, RFA,                NATIVIDAD (cx if SR), CHELSI, anes, MB, 3 Prep  4/15/2024: ABLATION OF ARRHYTHMOGENIC FOCUS FOR ATRIAL FIBRILLATION;   N/A      Comment:  Procedure: Ablation atrial fibrillation;  Surgeon:                Jacobo Marquez MD;  Location: [facility] EP LAB;                 Service: Cardiology;  Laterality: N/A;  afib, redo                PVI,PWI,cti, RFA, NATIVIDAD (cx if SR), CHELSI, anes, MB, 3 Prep  4/15/2024: ABLATION, ATRIAL FLUTTER, TYPICAL      Comment:  Procedure: Ablation, Atrial Flutter, Typical;  Surgeon:                Jacobo Marquez MD;  Location: [facility] EP LAB;                 Service: Cardiology;;  6/4/2021: CATHETERIZATION OF BOTH LEFT AND RIGHT HEART; N/A      Comment:  Procedure: CATHETERIZATION, HEART, BOTH LEFT AND RIGHT;                Surgeon: Baldemar Davalos MD;  Location: [facility] CATH LAB;                Service: Cardiology;  Laterality: N/A;  12/7/2016: COLONOSCOPY; N/A      Comment:  Procedure: COLONOSCOPY;  Surgeon: Zeus Guerrier MD;  Location: [facility] ENDO;  Service: Endoscopy;                 Laterality: N/A;  2/22/2021: COLONOSCOPY; N/A      Comment:  Procedure: COLONOSCOPY;  Surgeon: Joselyn Castanon MD;  Location: [facility] ENDO;  Service: Endoscopy;                 Laterality: N/A;  6/4/2021: CORONARY ANGIOGRAPHY; N/A      Comment:  Procedure: ANGIOGRAM, CORONARY ARTERY;  Surgeon: Baldemar Davalos MD;  Location: [facility] CATH LAB;   Service:                Cardiology;  Laterality: N/A;  10/26/2023: ECHOCARDIOGRAM,TRANSESOPHAGEAL; N/A      Comment:  Procedure: Transesophageal echo (NATIVIDAD) intra-procedure                log documentation;  Surgeon: Elisha Mac MD;                 Location: [facility] EP LAB;  Service: Cardiology;  Laterality:               N/A;  4/15/2024: ECHOCARDIOGRAM,TRANSESOPHAGEAL; N/A      Comment:  Procedure: Transesophageal echo (NATIVIDAD) intra-procedure                log documentation;  Surgeon: Marie Lan MD;                 Location: [facility] EP LAB;  Service: Cardiology;  Laterality:               N/A;  2010: prostate radiation seeds  6/4/2021: RIGHT HEART CATHETERIZATION; Right      Comment:  Procedure: INSERTION, CATHETER, RIGHT HEART;  Surgeon:                Baldemar Davalos MD;  Location: [facility] CATH LAB;  Service:               Cardiology;  Laterality: Right;  No date: TONSILLECTOMY  6/20/2023: TRANSESOPHAGEAL ECHOCARDIOGRAM WITH POSSIBLE CARDIOVERSION   (NATIVIDAD W/ POSS CARDIOVERSION); N/A      Comment:  Procedure: Transesophageal echo (NATIVIDAD) intra-procedure                log documentation;  Surgeon: Baldemar Davalos MD;                 Location: [facility] CATH LAB;  Service: Cardiology;                 Laterality: N/A;  No date: TRANSURETHRAL RESECTION OF PROSTATE  8/3/2023: TREATMENT OF CARDIAC ARRHYTHMIA; N/A      Comment:  Procedure: Cardioversion or Defibrillation;  Surgeon:                Baldemar Davalos MD;  Location: [facility] CATH LAB;                 Service: Cardiology;  Laterality: N/A;  10/26/2023: TREATMENT OF CARDIAC ARRHYTHMIA      Comment:  Procedure: Cardioversion or Defibrillation;  Surgeon:                Jacobo Marquez MD;  Location: [facility] EP LAB;                 Service: Cardiology;;  4/15/2024: TREATMENT OF CARDIAC ARRHYTHMIA      Comment:  Procedure: Cardioversion or Defibrillation;  Surgeon:                Jacobo Marquez MD;  Location: [facility] EP LAB;                 Service:  Cardiology;;    Social History    Socioeconomic History      Marital status:     Tobacco Use      Smoking status: Former        Packs/day: 0.00        Years: 1.5 packs/day for 10.0 years (15.0 ttl pk-yrs)        Types: Cigarettes        Start date: 10/30/1972        Quit date: 10/30/1982        Years since quittin.6      Smokeless tobacco: Never    Substance and Sexual Activity      Alcohol use: No      Drug use: No    Social Determinants of Health  Financial Resource Strain: Low Risk  (2024)      Overall Financial Resource Strain (CARDIA)          Difficulty of Paying Living Expenses: Not very hard  Food Insecurity: No Food Insecurity (2024)      Hunger Vital Sign          Worried About Running Out of Food in the Last Year: Never true          Ran Out of Food in the Last Year: Never true  Transportation Needs: No Transportation Needs (2024)      PRAPARE - Transportation          Lack of Transportation (Medical): No          Lack of Transportation (Non-Medical): No  Physical Activity: Insufficiently Active (2024)      Exercise Vital Sign          Days of Exercise per Week: 3 days          Minutes of Exercise per Session: 30 min  Stress: Stress Concern Present (2024)      [employer] - Occupational Stress Questionnaire          Feeling of Stress : To some extent  Housing Stability: Low Risk  (2024)      Housing Stability Vital Sign          Unable to Pay for Housing in the Last Year: No          Number of Places Lived in the Last Year: 1          Unstable Housing in the Last Year: No    Review of patient's family history indicates:  Problem: COPD      Relation: Mother          Name:               Age of Onset: (Not Specified)  Problem: Depression      Relation: Brother          Name:               Age of Onset: (Not Specified)  Problem: Colon cancer      Relation: Neg Hx          Name:               Age of Onset: (Not Specified)    Current Outpatient  Medications:  albuterol (PROVENTIL/VENTOLIN HFA) 90 mcg/actuation inhaler, INHALE 2 PUFFS BY MOUTH FOUR TIMES A DAY AS NEEDED FOR BRONCHOSPASM PREVENTION, Disp: , Rfl:   amitriptyline (ELAVIL) 50 MG tablet, Take 50 mg by mouth every evening., Disp: , Rfl:   amLODIPine (NORVASC) 10 MG tablet, Take 1 tablet (10 mg total) by mouth once daily., Disp: 90 tablet, Rfl: 3  apixaban (ELIQUIS) 5 mg Tab, Take 1 tablet (5 mg total) by mouth 2 (two) times daily., Disp: 180 tablet, Rfl: 3  aspirin (ECOTRIN) 81 MG EC tablet, Take 81 mg by mouth once daily., Disp: , Rfl:   bisoprolol (ZEBETA) 5 MG tablet, Take 5 mg by mouth once daily., Disp: , Rfl:   chlorhexidine (HIBICLENS) 4 % external liquid, APPLY SMALL AMOUNT TOPICALLY EVERY DAY - USE TO WASH HAIR/SCALP EVERY DAY - THOROUGHLY RINSE THE AREA TO BE CLEANED WITH WATER. APPLY THE MINIMUM AMOUNT OF PRODUCT NECESSARY TO COVER THE SKIN  AND WASH GENTLY.RINSE AGAIN THOROUGHLY, Disp: , Rfl:   clobetasoL (TEMOVATE) 0.05 % external solution, APPLY MODERATE AMOUNT TOPICALLY TWICE A DAY AS NEEDED TO THE SCALP, Disp: , Rfl:   EScitalopram oxalate (LEXAPRO) 20 MG tablet, Take 20 mg by mouth once daily., Disp: , Rfl:   fish oil-omega-3 fatty acids 300-1,000 mg capsule, Take 1 g by mouth once daily., Disp: , Rfl:   furosemide (LASIX) 80 MG tablet, Take 1 tablet (80 mg total) by mouth 2 (two) times daily., Disp: 60 tablet, Rfl: 11  gabapentin (NEURONTIN) 600 MG tablet, Take 600 mg by mouth., Disp: , Rfl:   loratadine (CLARITIN) 10 mg tablet, Take 10 mg by mouth once daily., Disp: , Rfl:   losartan (COZAAR) 100 MG tablet, Take 1 tablet (100 mg total) by mouth once daily., Disp: 90 tablet, Rfl: 3  methocarbamol (ROBAXIN) 750 MG Tab, Take 500 mg by mouth 3 (three) times daily., Disp: , Rfl:   mirtazapine (REMERON) 30 MG tablet, Take 30 mg by mouth every evening., Disp: , Rfl:   multivitamin capsule, Take 1 capsule by mouth once daily., Disp: , Rfl:   naproxen (NAPROSYN) 500 MG tablet, Take 500  mg by mouth 2 (two) times daily with meals., Disp: , Rfl:   omeprazole (PRILOSEC) 20 MG capsule, Take 20 mg by mouth once daily., Disp: , Rfl:   pravastatin (PRAVACHOL) 80 MG tablet, Take 1 tablet (80 mg total) by mouth once daily., Disp: 90 tablet, Rfl: 3  prazosin (MINIPRESS) 5 MG capsule, Take 5 mg by mouth 2 (two) times daily., Disp: , Rfl:   QUEtiapine (SEROQUEL) 50 MG tablet, Take 50 mg by mouth nightly. One-half tablet, Disp: , Rfl:   traZODone (DESYREL) 50 MG tablet, Take 50 mg by mouth every evening., Disp: , Rfl:   zolpidem (AMBIEN) 5 MG Tab, Take 1 tablet (5 mg total) by mouth nightly as needed (INSOMNIA). (Patient taking differently: Take 10 mg by mouth nightly as needed (INSOMNIA).), Disp: 30 tablet, Rfl: 5    No current facility-administered medications for this visit.        Review of Systems   Constitutional: Positive for malaise/fatigue.   HENT: Negative.     Eyes: Negative.    Cardiovascular:  Positive for dyspnea on exertion. Negative for chest pain, leg swelling, near-syncope, palpitations and syncope.   Respiratory: Negative.  Negative for shortness of breath.    Endocrine: Negative.    Hematologic/Lymphatic: Negative.    Skin: Negative.    Musculoskeletal: Negative.    Gastrointestinal: Negative.    Genitourinary: Negative.    Neurological: Negative.  Negative for dizziness and light-headedness.   Psychiatric/Behavioral: Negative.     Allergic/Immunologic: Negative.           Objective     Physical Exam  Vitals reviewed.   Constitutional:       General: He is not in acute distress.     Appearance: He is well-developed.   HENT:      Head: Normocephalic and atraumatic.   Eyes:      Pupils: Pupils are equal, round, and reactive to light.   Neck:      Thyroid: No thyromegaly.      Vascular: No JVD.   Cardiovascular:      Rate and Rhythm: Normal rate and regular rhythm.      Chest Wall: PMI is not displaced.      Heart sounds: Normal heart sounds, S1 normal and S2 normal. No murmur heard.     No  friction rub. No gallop.   Pulmonary:      Effort: Pulmonary effort is normal. No respiratory distress.      Breath sounds: Normal breath sounds. No wheezing or rales.   Abdominal:      General: Bowel sounds are normal. There is no distension.      Palpations: Abdomen is soft.      Tenderness: There is no abdominal tenderness. There is no guarding or rebound.   Musculoskeletal:         General: No tenderness. Normal range of motion.      Cervical back: Normal range of motion.   Skin:     General: Skin is warm and dry.      Findings: No erythema or rash.   Neurological:      Mental Status: He is alert and oriented to person, place, and time.      Cranial Nerves: No cranial nerve deficit.   Psychiatric:         Behavior: Behavior normal.         Thought Content: Thought content normal.         Judgment: Judgment normal.            Assessment and Plan     1. Paroxysmal atrial fibrillation        Plan:  73 yoM here for arrhythmia management. He has maintained NSR since his last ablation. He has questions about weight loss drugs. I recommended he discuss these with his PCP. RTC 6m

## 2024-10-07 ENCOUNTER — OFFICE VISIT (OUTPATIENT)
Dept: CARDIOLOGY | Facility: CLINIC | Age: 74
End: 2024-10-07
Payer: MEDICARE

## 2024-10-07 VITALS
OXYGEN SATURATION: 95 % | BODY MASS INDEX: 44.74 KG/M2 | HEART RATE: 110 BPM | SYSTOLIC BLOOD PRESSURE: 118 MMHG | WEIGHT: 315 LBS | DIASTOLIC BLOOD PRESSURE: 71 MMHG

## 2024-10-07 DIAGNOSIS — I48.0 PAROXYSMAL ATRIAL FIBRILLATION: Primary | ICD-10-CM

## 2024-10-07 DIAGNOSIS — K21.9 GASTROESOPHAGEAL REFLUX DISEASE, UNSPECIFIED WHETHER ESOPHAGITIS PRESENT: ICD-10-CM

## 2024-10-07 DIAGNOSIS — I49.9 CARDIAC ARRHYTHMIA, UNSPECIFIED CARDIAC ARRHYTHMIA TYPE: ICD-10-CM

## 2024-10-07 DIAGNOSIS — E66.01 MORBID OBESITY: ICD-10-CM

## 2024-10-07 DIAGNOSIS — Z98.890 HISTORY OF CORONARY ANGIOGRAM: ICD-10-CM

## 2024-10-07 DIAGNOSIS — G47.33 OSA (OBSTRUCTIVE SLEEP APNEA): ICD-10-CM

## 2024-10-07 DIAGNOSIS — E78.5 HYPERLIPIDEMIA, UNSPECIFIED HYPERLIPIDEMIA TYPE: ICD-10-CM

## 2024-10-07 DIAGNOSIS — R06.09 DOE (DYSPNEA ON EXERTION): ICD-10-CM

## 2024-10-07 PROCEDURE — 93005 ELECTROCARDIOGRAM TRACING: CPT

## 2024-10-07 RX ORDER — METOLAZONE 2.5 MG/1
2.5 TABLET ORAL
Qty: 8 TABLET | Refills: 11 | Status: SHIPPED | OUTPATIENT
Start: 2024-10-07 | End: 2025-10-07

## 2024-10-07 RX ORDER — OMEPRAZOLE 20 MG/1
20 CAPSULE, DELAYED RELEASE ORAL DAILY
Qty: 90 CAPSULE | Refills: 3 | Status: SHIPPED | OUTPATIENT
Start: 2024-10-07

## 2024-10-07 RX ORDER — FLECAINIDE ACETATE 100 MG/1
100 TABLET ORAL EVERY 12 HOURS
COMMUNITY

## 2024-10-07 RX ORDER — FUROSEMIDE 40 MG/1
80 TABLET ORAL 2 TIMES DAILY
Qty: 120 TABLET | Refills: 11 | OUTPATIENT
Start: 2024-10-07 | End: 2025-10-07

## 2024-10-07 NOTE — PROGRESS NOTES
Subjective:   Patient ID:  Lucas Garrison is a 74 y.o. male who presents for evaluation of Chest Pain and Shortness of Breath  10.7.2024  Comes in for follow-up.    No major changes to his symptoms.  He states at times he get more leg swelling but it goes away with the Lasix.  However his dyspnea is unchanged.    He has a left deltopectoral pain tender on exam.    He is noted to be irregular on exam.  EKG confirms AFib.    His last visit with Dr. Marquez was in June, EKG unclear if AFib or sinus.      3.7.2024  73-year-old male with past medical history below.  History of AFib status post 2 cardioversions and an ablation in October 2023 lastly.    Continues to go back in to AFib.  With symptoms of fatigue.  With plan to have a repeat ablation next month with Dr. Marquez.    Compliant with the medications.    We had stopped amiodarone and flecainide in the past due to severe bradycardia.    No bleeding.      Shortness of Breath  Associated symptoms include chest pain and leg swelling. Pertinent negatives include no syncope.   Dizziness:    Associated symptoms: chest pain.no syncope and no palpitations.  Chest Pain   Associated symptoms include dizziness and malaise/fatigue. Pertinent negatives include no palpitations, shortness of breath or syncope.     Past Medical History:   Diagnosis Date    atrial fibrillation     Cancer     DDD (degenerative disc disease)     H/O prostate cancer     HLD (hyperlipidemia)     Hypertension     Lumbago     REGINA (obstructive sleep apnea)     Peripheral neuropathy     PTSD (post-traumatic stress disorder)        Past Surgical History:   Procedure Laterality Date    ABLATION OF ARRHYTHMOGENIC FOCUS FOR ATRIAL FIBRILLATION N/A 10/26/2023    Procedure: Ablation atrial fibrillation;  Surgeon: Jacobo Marquez MD;  Location: [facility] EP [facility];  Service: Cardiology;  Laterality: N/A;  afib, PVI, RFA, NATIVIDAD (cx if SR), CHELSI, anes, MB, 3 Prep    ABLATION OF ARRHYTHMOGENIC FOCUS FOR ATRIAL  FIBRILLATION N/A 4/15/2024    Procedure: Ablation atrial fibrillation;  Surgeon: Jacobo Marquez MD;  Location: [facility] EP LAB;  Service: Cardiology;  Laterality: N/A;  afib, redo PVI,PWI,cti, RFA, NATIVIDAD (cx if SR), CHELSI, anes, MB, 3 Prep    ABLATION, ATRIAL FLUTTER, TYPICAL  4/15/2024    Procedure: Ablation, Atrial Flutter, Typical;  Surgeon: Jacobo Marquez MD;  Location: [facility] EP LAB;  Service: Cardiology;;    CATHETERIZATION OF BOTH LEFT AND RIGHT HEART N/A 6/4/2021    Procedure: CATHETERIZATION, HEART, BOTH LEFT AND RIGHT;  Surgeon: Baldemar Davalos MD;  Location: [facility] CATH LAB;  Service: Cardiology;  Laterality: N/A;    COLONOSCOPY N/A 12/7/2016    Procedure: COLONOSCOPY;  Surgeon: Zeus Guerrier MD;  Location: [facility] ENDO;  Service: Endoscopy;  Laterality: N/A;    COLONOSCOPY N/A 2/22/2021    Procedure: COLONOSCOPY;  Surgeon: Joselyn Castanon MD;  Location: [facility] ENDO;  Service: Endoscopy;  Laterality: N/A;    CORONARY ANGIOGRAPHY N/A 6/4/2021    Procedure: ANGIOGRAM, CORONARY ARTERY;  Surgeon: Baldemar Davalos MD;  Location: [facility] CATH LAB;  Service: Cardiology;  Laterality: N/A;    ECHOCARDIOGRAM,TRANSESOPHAGEAL N/A 10/26/2023    Procedure: Transesophageal echo (NATIVIDAD) intra-procedure log documentation;  Surgeon: Elisha Mac MD;  Location: [facility] EP LAB;  Service: Cardiology;  Laterality: N/A;    ECHOCARDIOGRAM,TRANSESOPHAGEAL N/A 4/15/2024    Procedure: Transesophageal echo (NATIVIDAD) intra-procedure log documentation;  Surgeon: Marie Lan MD;  Location: [facility] EP LAB;  Service: Cardiology;  Laterality: N/A;    prostate radiation seeds  2010    RIGHT HEART CATHETERIZATION Right 6/4/2021    Procedure: INSERTION, CATHETER, RIGHT HEART;  Surgeon: Baldemar Davalos MD;  Location: [facility] CATH LAB;  Service: Cardiology;  Laterality: Right;    TONSILLECTOMY      TRANSESOPHAGEAL ECHOCARDIOGRAM WITH POSSIBLE CARDIOVERSION (NATIVIDAD W/ POSS CARDIOVERSION) N/A 6/20/2023    Procedure: Transesophageal echo  (NATIVIDAD) intra-procedure log documentation;  Surgeon: Baldemar Davalos MD;  Location: [facility] CATH LAB;  Service: Cardiology;  Laterality: N/A;    TRANSURETHRAL RESECTION OF PROSTATE      TREATMENT OF CARDIAC ARRHYTHMIA N/A 8/3/2023    Procedure: Cardioversion or Defibrillation;  Surgeon: Baldemar Davalos MD;  Location: [facility] CATH LAB;  Service: Cardiology;  Laterality: N/A;    TREATMENT OF CARDIAC ARRHYTHMIA  10/26/2023    Procedure: Cardioversion or Defibrillation;  Surgeon: Jacobo Marquez MD;  Location: [facility] EP LAB;  Service: Cardiology;;    TREATMENT OF CARDIAC ARRHYTHMIA  4/15/2024    Procedure: Cardioversion or Defibrillation;  Surgeon: Jacobo Marquez MD;  Location: [facility] EP LAB;  Service: Cardiology;;       Social History     Tobacco Use    Smoking status: Former     Current packs/day: 0.00     Average packs/day: 1.5 packs/day for 10.0 years (15.0 ttl pk-yrs)     Types: Cigarettes     Start date: 10/30/1972     Quit date: 10/30/1982     Years since quittin.9    Smokeless tobacco: Never   Substance Use Topics    Alcohol use: No    Drug use: No       Family History   Problem Relation Name Age of Onset    COPD Mother      Depression Brother      Colon cancer Neg Hx         Review of Systems   Constitutional: Positive for malaise/fatigue.   Cardiovascular:  Positive for chest pain and leg swelling. Negative for dyspnea on exertion, palpitations and syncope.   Respiratory:  Negative for shortness of breath.    Genitourinary: Negative.    Neurological:  Positive for dizziness.       Current Outpatient Medications on File Prior to Visit   Medication Sig    albuterol (PROVENTIL/VENTOLIN HFA) 90 mcg/actuation inhaler INHALE 2 PUFFS BY MOUTH FOUR TIMES A DAY AS NEEDED FOR BRONCHOSPASM PREVENTION    amLODIPine (NORVASC) 10 MG tablet Take 1 tablet (10 mg total) by mouth once daily.    apixaban (ELIQUIS) 5 mg Tab Take 1 tablet (5 mg total) by mouth 2 (two) times daily.    aspirin (ECOTRIN) 81 MG EC tablet Take  81 mg by mouth once daily.    bisoprolol (ZEBETA) 5 MG tablet Take 5 mg by mouth once daily.    chlorhexidine (HIBICLENS) 4 % external liquid APPLY SMALL AMOUNT TOPICALLY EVERY DAY - USE TO WASH HAIR/SCALP EVERY DAY - THOROUGHLY RINSE THE AREA TO BE CLEANED WITH WATER. APPLY THE MINIMUM AMOUNT OF PRODUCT NECESSARY TO COVER THE SKIN  AND WASH GENTLY.RINSE AGAIN THOROUGHLY    clobetasoL (TEMOVATE) 0.05 % external solution APPLY MODERATE AMOUNT TOPICALLY TWICE A DAY AS NEEDED TO THE SCALP    EScitalopram oxalate (LEXAPRO) 20 MG tablet Take 20 mg by mouth once daily.    gabapentin (NEURONTIN) 600 MG tablet Take 600 mg by mouth.    loratadine (CLARITIN) 10 mg tablet Take 10 mg by mouth once daily.    losartan (COZAAR) 100 MG tablet Take 1 tablet (100 mg total) by mouth once daily.    methocarbamol (ROBAXIN) 750 MG Tab Take 500 mg by mouth 3 (three) times daily.    mirtazapine (REMERON) 30 MG tablet Take 30 mg by mouth every evening.    multivitamin capsule Take 1 capsule by mouth once daily.    naproxen (NAPROSYN) 500 MG tablet Take 500 mg by mouth 2 (two) times daily with meals.    pravastatin (PRAVACHOL) 80 MG tablet Take 1 tablet (80 mg total) by mouth once daily.    prazosin (MINIPRESS) 5 MG capsule Take 5 mg by mouth 2 (two) times daily.    QUEtiapine (SEROQUEL) 50 MG tablet Take 50 mg by mouth nightly. One-half tablet    traZODone (DESYREL) 50 MG tablet Take 50 mg by mouth every evening.    [DISCONTINUED] furosemide (LASIX) 80 MG tablet Take 1 tablet (80 mg total) by mouth 2 (two) times daily.    [DISCONTINUED] omeprazole (PRILOSEC) 20 MG capsule Take 20 mg by mouth once daily.    amitriptyline (ELAVIL) 50 MG tablet Take 50 mg by mouth every evening. (Patient not taking: Reported on 10/7/2024)    fish oil-omega-3 fatty acids 300-1,000 mg capsule Take 1 g by mouth once daily. (Patient not taking: Reported on 10/7/2024)    flecainide (TAMBOCOR) 100 MG Tab Take 100 mg by mouth every 12 (twelve) hours.    zolpidem  "(AMBIEN) 5 MG Tab Take 1 tablet (5 mg total) by mouth nightly as needed (INSOMNIA). (Patient taking differently: Take 10 mg by mouth nightly as needed (INSOMNIA).)     No current facility-administered medications on file prior to visit.       Objective:   Objective:  Wt Readings from Last 3 Encounters:   10/07/24 (!) 145.5 kg (320 lb 12.3 oz)   06/26/24 (!) 139 kg (306 lb 7 oz)   04/15/24 (!) 136.4 kg (300 lb 11.3 oz)     Temp Readings from Last 3 Encounters:   04/16/24 97.6 °F (36.4 °C) (Oral)   10/27/23 98.5 °F (36.9 °C) (Oral)   08/03/23 98.1 °F (36.7 °C)     BP Readings from Last 3 Encounters:   10/07/24 118/71   06/26/24 130/78   04/16/24 132/73     Pulse Readings from Last 3 Encounters:   10/07/24 110   06/26/24 78   04/16/24 (!) 57       Physical Exam  Vitals reviewed.   Constitutional:       Appearance: He is well-developed.   Neck:      Vascular: No carotid bruit.   Cardiovascular:      Rate and Rhythm: Rhythm irregular.      Pulses: Intact distal pulses.      Heart sounds: Normal heart sounds. No murmur heard.  Pulmonary:      Breath sounds: Normal breath sounds.   Musculoskeletal:         General: Swelling present.   Neurological:      Mental Status: He is oriented to person, place, and time.         No results found for: "CHOL"  No results found for: "HDL"  No results found for: "LDLCALC"  No results found for: "TRIG"  No results found for: "CHOLHDL"    Chemistry        Component Value Date/Time     (H) 04/08/2024 1104    K 4.1 04/08/2024 1104     04/08/2024 1104    CO2 30 (H) 04/08/2024 1104    BUN 15 04/08/2024 1104    CREATININE 1.4 04/08/2024 1104     04/08/2024 1104        Component Value Date/Time    CALCIUM 9.2 04/08/2024 1104    ALKPHOS 52 (L) 04/06/2021 0729    AST 32 04/06/2021 0729    ALT 45 (H) 04/06/2021 0729    BILITOT 0.7 04/06/2021 0729    ESTGFRAFRICA >60.0 01/03/2022 1234    EGFRNONAA >60.0 01/03/2022 1234          No results found for: "TSH"  Lab Results   Component " Value Date    INR 1.1 04/08/2024    INR 1.1 10/19/2023    INR 1.1 07/27/2023     Lab Results   Component Value Date    WBC 4.94 04/08/2024    HGB 15.1 04/08/2024    HCT 43.3 04/08/2024    MCV 97 04/08/2024     04/08/2024     BNP  @LABRCNTIP(BNP,BNPTRIAGEBLO)@  CrCl cannot be calculated (Patient's most recent lab result is older than the maximum 7 days allowed.).     Imaging:  ======  No results found for this or any previous visit.    No results found for this or any previous visit.    No results found for this or any previous visit.    No results found for this or any previous visit.    No results found for this or any previous visit.    No valid procedures specified.    Diagnostic Results:  ECG: Reviewed, nsr after dccv       States he had a normal heart cath in 2010 with Dr Preciado       2021   Left Main   The vessel is large.   Left Anterior Descending   First Diagonal Branch   The vessel is large.   Ramus Intermedius   The vessel is large.   Left Circumflex   The vessel is large.   First Obtuse Marginal Branch   The vessel is large.   Right Coronary Artery   The vessel is large.   Right Posterior Descending Artery   The vessel is large.   Right Posterior Atrioventricular Artery   The vessel is angiographically normal.   Intervention    No interventions have been documented.  Left Heart Findings    Left Ventricle    LVEDP (Pre):15 mmHG   Aortic Valve    No gradient on pullback   Right Heart Findings    Right Heart Pressures    RA: 8 RV: 38/ 8 PA: 36/ 17/ 22 PWP: 15 .   Cardiac output was 8. Cardiac index is 3.22 L/min/m2.   O2 Sat: PA 75%.       The ASCVD Risk score (Edmeston DK, et al., 2019) failed to calculate for the following reasons:    Cannot find a previous HDL lab    Cannot find a previous total cholesterol lab    Assessment and Plan:   Paroxysmal atrial fibrillation  -     IN OFFICE EKG 12-LEAD (to Muse)    SINGH (dyspnea on exertion)  -     furosemide (LASIX) 40 MG tablet; Take 2 tablets (80 mg  total) by mouth 2 (two) times daily.  Dispense: 120 tablet; Refill: 11  -     metOLazone (ZAROXOLYN) 2.5 MG tablet; Take 1 tablet (2.5 mg total) by mouth twice a week.  Dispense: 8 tablet; Refill: 11    Gastroesophageal reflux disease, unspecified whether esophagitis present  -     omeprazole (PRILOSEC) 20 MG capsule; Take 1 capsule (20 mg total) by mouth once daily.  Dispense: 90 capsule; Refill: 3    REGINA (obstructive sleep apnea)    Cardiac arrhythmia, unspecified cardiac arrhythmia type    Morbid obesity    History of coronary angiogram    Hyperlipidemia, unspecified hyperlipidemia type      He is compliant with the CPAP.    Reviewed all tests and above medical conditions with patient in detail and formulated treatment plan.  Add metolazone given his fluid status.    Continue with bisoprolol.  He was off the flecainide for the past few months he started back on it only a week ago.  History of multiple episodes of cardioversions and he converted back to AFib after each  He is in AFib today, rates in the low 100, we will update EP, patient states no changes to his symptoms    Follow up in 6 months

## 2024-10-09 LAB
OHS QRS DURATION: 96 MS
OHS QTC CALCULATION: 496 MS

## 2025-01-06 DIAGNOSIS — I48.0 PAROXYSMAL ATRIAL FIBRILLATION: Primary | ICD-10-CM

## 2025-01-08 ENCOUNTER — OFFICE VISIT (OUTPATIENT)
Dept: CARDIOLOGY | Facility: CLINIC | Age: 75
End: 2025-01-08
Payer: OTHER GOVERNMENT

## 2025-01-08 ENCOUNTER — HOSPITAL ENCOUNTER (OUTPATIENT)
Dept: CARDIOLOGY | Facility: HOSPITAL | Age: 75
Discharge: HOME | End: 2025-01-08
Attending: INTERNAL MEDICINE
Payer: OTHER GOVERNMENT

## 2025-01-08 VITALS
RESPIRATION RATE: 16 BRPM | HEIGHT: 71 IN | BODY MASS INDEX: 44.1 KG/M2 | DIASTOLIC BLOOD PRESSURE: 89 MMHG | OXYGEN SATURATION: 99 % | HEART RATE: 78 BPM | WEIGHT: 315 LBS | SYSTOLIC BLOOD PRESSURE: 153 MMHG

## 2025-01-08 DIAGNOSIS — I48.0 PAROXYSMAL ATRIAL FIBRILLATION: ICD-10-CM

## 2025-01-08 DIAGNOSIS — I50.33 ACUTE ON CHRONIC DIASTOLIC HEART FAILURE: ICD-10-CM

## 2025-01-08 DIAGNOSIS — E66.01 MORBID OBESITY: ICD-10-CM

## 2025-01-08 PROCEDURE — 93010 ELECTROCARDIOGRAM REPORT: CPT | Mod: ,,, | Performed by: INTERNAL MEDICINE

## 2025-01-08 PROCEDURE — 99215 OFFICE O/P EST HI 40 MIN: CPT | Mod: PBBFAC,25 | Performed by: INTERNAL MEDICINE

## 2025-01-08 PROCEDURE — 99999 PR PBB SHADOW E&M-EST. PATIENT-LVL V: CPT | Mod: PBBFAC,,, | Performed by: INTERNAL MEDICINE

## 2025-01-08 PROCEDURE — 93005 ELECTROCARDIOGRAM TRACING: CPT

## 2025-01-08 NOTE — PROGRESS NOTES
Subjective   Patient ID:  Lucas Garrison is a 74 y.o. male who presents for follow-up of No chief complaint on file.      74 yoM here for arrhythmia management.     9/23: He developed symptomatic persistent AF leading to NATIVIDAD/CV twice in August 2023. He had NATIVIDAD/CV 4/21. NSR 5/21, AF noted 6/23. EF normal. CVA prophylaxis with xarelto. He has been on amiodarone in the past (4-6/21). He was placed on flecainide a few months ago and required increased dose due to breakthrough AF event which has resulted in symptomatic bradycardia.     1/24: He felt great for 1-2 months then he had return of AF symptoms. Back in AF today.     6/24: Repeat ablation 4/15/24. ECG shows SR with very small P waves    Interval history: Recurrence of AF intermittently over the past few weeks/months. He he mild symptoms. He is not interested in pursuing rhythm control at this time. He asks about semaglutide agents.     Re-do ablation 4/15/24:     ·  Cardioversion was successfully performed with restoration of normal sinus rhythm.  ·  CTI ablation.  ·  Pulmonary vein re-isolation of LPVs, RPVs isolated at baseline  ·  Posterior wall partial isolation with left lower quadrant isolation limited by esophageal heating  ·  3D mapping performed with Ensite.  ·  Intracardiac echo.  ·  Challenging CS positioning    PVI 10/26/23:    ·  3D mapping performed with Ensite.  ·  Intracardiac echo.  ·  Pulmonary vein isolation.    NATIVIDAD 8/23:  · The left ventricle is normal in size with normal systolic function.  · Normal right ventricular size with normal right ventricular systolic function.  · Normal central venous pressure (3 mmHg).  · Atrial fibrillation observed.  · Normal appearing left atrial appendage. No thrombus is present in the appendage.  · Mild mitral regurgitation.  · Mild tricuspid regurgitation.  · The estimated ejection fraction is 60%.  · A 200 J synchronized cardioversion was successfully performed with restoration of normal sinus rhythm.     My  interpretation of the ECG is:    Past Medical History:  No date: atrial fibrillation  No date: Cancer  No date: DDD (degenerative disc disease)  No date: H/O prostate cancer  No date: HLD (hyperlipidemia)  No date: Hypertension  No date: Lumbago  No date: REGINA (obstructive sleep apnea)  No date: Peripheral neuropathy  No date: PTSD (post-traumatic stress disorder)    Past Surgical History:  10/26/2023: ABLATION OF ARRHYTHMOGENIC FOCUS FOR ATRIAL FIBRILLATION;   N/A      Comment:  Procedure: Ablation atrial fibrillation;  Surgeon:                Jacobo Marquez MD;  Location: [facility] EP LAB;                 Service: Cardiology;  Laterality: N/A;  afib, PVI, RFA,                NATIVIDAD (cx if SR), CHELSI, anes, MB, 3 Prep  4/15/2024: ABLATION OF ARRHYTHMOGENIC FOCUS FOR ATRIAL FIBRILLATION;   N/A      Comment:  Procedure: Ablation atrial fibrillation;  Surgeon:                Jacobo Marquez MD;  Location: [facility] EP LAB;                 Service: Cardiology;  Laterality: N/A;  afib, redo                PVI,PWI,cti, RFA, NATIVIDAD (cx if SR), CHELSI, anes, MB, 3 Prep  4/15/2024: ABLATION, ATRIAL FLUTTER, TYPICAL      Comment:  Procedure: Ablation, Atrial Flutter, Typical;  Surgeon:                Jacobo Marquez MD;  Location: [facility] EP LAB;                 Service: Cardiology;;  6/4/2021: CATHETERIZATION OF BOTH LEFT AND RIGHT HEART; N/A      Comment:  Procedure: CATHETERIZATION, HEART, BOTH LEFT AND RIGHT;                Surgeon: Baldemar Davalos MD;  Location: [facility] CATH LAB;                Service: Cardiology;  Laterality: N/A;  12/7/2016: COLONOSCOPY; N/A      Comment:  Procedure: COLONOSCOPY;  Surgeon: Zeus Guerrier MD;  Location: [facility] ENDO;  Service: Endoscopy;                 Laterality: N/A;  2/22/2021: COLONOSCOPY; N/A      Comment:  Procedure: COLONOSCOPY;  Surgeon: Joselyn Castanon MD;  Location: [facility] ENDO;  Service: Endoscopy;                 Laterality: N/A;  6/4/2021:  CORONARY ANGIOGRAPHY; N/A      Comment:  Procedure: ANGIOGRAM, CORONARY ARTERY;  Surgeon: Baldemar Davalos MD;  Location: [facility] CATH LAB;  Service:                Cardiology;  Laterality: N/A;  10/26/2023: ECHOCARDIOGRAM,TRANSESOPHAGEAL; N/A      Comment:  Procedure: Transesophageal echo (NATIVIDAD) intra-procedure                log documentation;  Surgeon: Elisha Mac MD;                 Location: [facility] EP LAB;  Service: Cardiology;  Laterality:               N/A;  4/15/2024: ECHOCARDIOGRAM,TRANSESOPHAGEAL; N/A      Comment:  Procedure: Transesophageal echo (NATIVIDAD) intra-procedure                log documentation;  Surgeon: Marie Lan MD;                 Location: [facility] EP LAB;  Service: Cardiology;  Laterality:               N/A;  2010: prostate radiation seeds  6/4/2021: RIGHT HEART CATHETERIZATION; Right      Comment:  Procedure: INSERTION, CATHETER, RIGHT HEART;  Surgeon:                Baldemar Davalos MD;  Location: [facility] CATH LAB;  Service:               Cardiology;  Laterality: Right;  No date: TONSILLECTOMY  6/20/2023: TRANSESOPHAGEAL ECHOCARDIOGRAM WITH POSSIBLE CARDIOVERSION   (NATIVIDAD W/ POSS CARDIOVERSION); N/A      Comment:  Procedure: Transesophageal echo (NATIVIDAD) intra-procedure                log documentation;  Surgeon: Baldemar Davalos MD;                 Location: [facility] CATH LAB;  Service: Cardiology;                 Laterality: N/A;  No date: TRANSURETHRAL RESECTION OF PROSTATE  8/3/2023: TREATMENT OF CARDIAC ARRHYTHMIA; N/A      Comment:  Procedure: Cardioversion or Defibrillation;  Surgeon:                Baldemar Davalos MD;  Location: [facility] CATH LAB;                 Service: Cardiology;  Laterality: N/A;  10/26/2023: TREATMENT OF CARDIAC ARRHYTHMIA      Comment:  Procedure: Cardioversion or Defibrillation;  Surgeon:                Jacobo Marquez MD;  Location: [facility] EP LAB;                 Service: Cardiology;;  4/15/2024: TREATMENT OF CARDIAC ARRHYTHMIA      Comment:   Procedure: Cardioversion or Defibrillation;  Surgeon:                Jacobo Marquez MD;  Location: [facility] LAB;                 Service: Cardiology;;    Social History    Socioeconomic History      Marital status:     Tobacco Use      Smoking status: Former        Packs/day: 0.00        Years: 1.5 packs/day for 10.0 years (15.0 ttl pk-yrs)        Types: Cigarettes        Start date: 10/30/1972        Quit date: 10/30/1982        Years since quittin.2      Smokeless tobacco: Never    Substance and Sexual Activity      Alcohol use: No      Drug use: No    Social Drivers of Health  Financial Resource Strain: Low Risk  (2024)      Overall Financial Resource Strain (CARDIA)          Difficulty of Paying Living Expenses: Not very hard  Food Insecurity: No Food Insecurity (2024)      Hunger Vital Sign          Worried About Running Out of Food in the Last Year: Never true          Ran Out of Food in the Last Year: Never true  Transportation Needs: No Transportation Needs (2024)      PRAPARE - Transportation          Lack of Transportation (Medical): No          Lack of Transportation (Non-Medical): No  Physical Activity: Insufficiently Active (2024)      Exercise Vital Sign          Days of Exercise per Week: 3 days          Minutes of Exercise per Session: 30 min  Stress: Stress Concern Present (2024)      [employer] - Occupational Stress Questionnaire          Feeling of Stress : To some extent  Housing Stability: Low Risk  (2024)      Housing Stability Vital Sign          Unable to Pay for Housing in the Last Year: No          Number of Places Lived in the Last Year: 1          Unstable Housing in the Last Year: No    Review of patient's family history indicates:  Problem: COPD      Relation: Mother          Name:               Age of Onset: (Not Specified)  Problem: Depression      Relation: Brother          Name:               Age of Onset: (Not  Specified)  Problem: Colon cancer      Relation: Neg Hx          Name:               Age of Onset: (Not Specified)      Current Outpatient Medications:  albuterol (PROVENTIL/VENTOLIN HFA) 90 mcg/actuation inhaler, INHALE 2 PUFFS BY MOUTH FOUR TIMES A DAY AS NEEDED FOR BRONCHOSPASM PREVENTION, Disp: , Rfl:   amitriptyline (ELAVIL) 50 MG tablet, Take 50 mg by mouth every evening. (Patient not taking: Reported on 10/7/2024), Disp: , Rfl:   amLODIPine (NORVASC) 10 MG tablet, Take 1 tablet (10 mg total) by mouth once daily., Disp: 90 tablet, Rfl: 3  apixaban (ELIQUIS) 5 mg Tab, Take 1 tablet (5 mg total) by mouth 2 (two) times daily., Disp: 180 tablet, Rfl: 3  aspirin (ECOTRIN) 81 MG EC tablet, Take 81 mg by mouth once daily., Disp: , Rfl:   bisoprolol (ZEBETA) 5 MG tablet, Take 5 mg by mouth once daily., Disp: , Rfl:   chlorhexidine (HIBICLENS) 4 % external liquid, APPLY SMALL AMOUNT TOPICALLY EVERY DAY - USE TO WASH HAIR/SCALP EVERY DAY - THOROUGHLY RINSE THE AREA TO BE CLEANED WITH WATER. APPLY THE MINIMUM AMOUNT OF PRODUCT NECESSARY TO COVER THE SKIN  AND WASH GENTLY.RINSE AGAIN THOROUGHLY, Disp: , Rfl:   clobetasoL (TEMOVATE) 0.05 % external solution, APPLY MODERATE AMOUNT TOPICALLY TWICE A DAY AS NEEDED TO THE SCALP, Disp: , Rfl:   EScitalopram oxalate (LEXAPRO) 20 MG tablet, Take 20 mg by mouth once daily., Disp: , Rfl:   fish oil-omega-3 fatty acids 300-1,000 mg capsule, Take 1 g by mouth once daily. (Patient not taking: Reported on 10/7/2024), Disp: , Rfl:   flecainide (TAMBOCOR) 100 MG Tab, Take 100 mg by mouth every 12 (twelve) hours., Disp: , Rfl:   furosemide (LASIX) 40 MG tablet, Take 2 tablets (80 mg total) by mouth 2 (two) times daily., Disp: 120 tablet, Rfl: 11  gabapentin (NEURONTIN) 600 MG tablet, Take 600 mg by mouth., Disp: , Rfl:   loratadine (CLARITIN) 10 mg tablet, Take 10 mg by mouth once daily., Disp: , Rfl:   losartan (COZAAR) 100 MG tablet, Take 1 tablet (100 mg total) by mouth once daily.,  Disp: 90 tablet, Rfl: 3  methocarbamol (ROBAXIN) 750 MG Tab, Take 500 mg by mouth 3 (three) times daily., Disp: , Rfl:   metOLazone (ZAROXOLYN) 2.5 MG tablet, Take 1 tablet (2.5 mg total) by mouth twice a week., Disp: 8 tablet, Rfl: 11  mirtazapine (REMERON) 30 MG tablet, Take 30 mg by mouth every evening., Disp: , Rfl:   multivitamin capsule, Take 1 capsule by mouth once daily., Disp: , Rfl:   naproxen (NAPROSYN) 500 MG tablet, Take 500 mg by mouth 2 (two) times daily with meals., Disp: , Rfl:   omeprazole (PRILOSEC) 20 MG capsule, Take 1 capsule (20 mg total) by mouth once daily., Disp: 90 capsule, Rfl: 3  pravastatin (PRAVACHOL) 80 MG tablet, Take 1 tablet (80 mg total) by mouth once daily., Disp: 90 tablet, Rfl: 3  prazosin (MINIPRESS) 5 MG capsule, Take 5 mg by mouth 2 (two) times daily., Disp: , Rfl:   QUEtiapine (SEROQUEL) 50 MG tablet, Take 50 mg by mouth nightly. One-half tablet, Disp: , Rfl:   traZODone (DESYREL) 50 MG tablet, Take 50 mg by mouth every evening., Disp: , Rfl:   zolpidem (AMBIEN) 5 MG Tab, Take 1 tablet (5 mg total) by mouth nightly as needed (INSOMNIA). (Patient taking differently: Take 10 mg by mouth nightly as needed (INSOMNIA).), Disp: 30 tablet, Rfl: 5    No current facility-administered medications for this visit.          Review of Systems   Constitutional: Positive for malaise/fatigue.   HENT: Negative.     Eyes: Negative.    Cardiovascular:  Positive for dyspnea on exertion. Negative for chest pain, leg swelling, near-syncope, palpitations and syncope.   Respiratory:  Positive for shortness of breath.    Endocrine: Negative.    Hematologic/Lymphatic: Negative.    Skin: Negative.    Musculoskeletal: Negative.    Gastrointestinal: Negative.    Genitourinary: Negative.    Neurological: Negative.  Negative for dizziness and light-headedness.   Psychiatric/Behavioral: Negative.     Allergic/Immunologic: Negative.           Objective     Physical Exam  Vitals reviewed.   Constitutional:        Appearance: He is well-developed.   Neck:      Vascular: No carotid bruit.   Cardiovascular:      Rate and Rhythm: Normal rate. Rhythm irregular.      Pulses: Intact distal pulses.      Heart sounds: Normal heart sounds. No murmur heard.  Pulmonary:      Breath sounds: Normal breath sounds.   Musculoskeletal:         General: Swelling present.   Neurological:      Mental Status: He is oriented to person, place, and time.            Assessment and Plan     1. Paroxysmal atrial fibrillation    2. Morbid obesity    3. Acute on chronic diastolic heart failure        Plan:  74 yoM here for AF management. Recurrence after second PVI attempt. He wishes to continue rate control and lose weight. [facility] is [facility]% [facility]. I recommended he pursue weight loss agents if he is eligible for them. RTC 3m

## 2025-01-09 DIAGNOSIS — R06.09 DOE (DYSPNEA ON EXERTION): ICD-10-CM

## 2025-01-09 LAB
OHS QRS DURATION: 90 MS
OHS QTC CALCULATION: 447 MS

## 2025-01-10 RX ORDER — FUROSEMIDE 40 MG/1
80 TABLET ORAL 2 TIMES DAILY
Qty: 120 TABLET | Refills: 11 | Status: SHIPPED | OUTPATIENT
Start: 2025-01-10 | End: 2026-01-10

## 2025-01-10 RX ORDER — METOLAZONE 2.5 MG/1
2.5 TABLET ORAL
Qty: 8 TABLET | Refills: 11 | Status: SHIPPED | OUTPATIENT
Start: 2025-01-13 | End: 2026-01-13

## 2025-02-07 ENCOUNTER — PATIENT MESSAGE (OUTPATIENT)
Dept: CARDIOLOGY | Facility: CLINIC | Age: 75
End: 2025-02-07
Payer: OTHER GOVERNMENT

## 2025-03-28 DIAGNOSIS — I48.0 PAROXYSMAL ATRIAL FIBRILLATION: Primary | ICD-10-CM

## 2025-03-28 DIAGNOSIS — I48.19 PERSISTENT ATRIAL FIBRILLATION: ICD-10-CM

## 2025-05-05 ENCOUNTER — HOSPITAL ENCOUNTER (EMERGENCY)
Facility: HOSPITAL | Age: 75
Discharge: HOME | End: 2025-05-05
Attending: EMERGENCY MEDICINE
Payer: MEDICARE

## 2025-05-05 ENCOUNTER — OFFICE VISIT (OUTPATIENT)
Dept: CARDIOLOGY | Facility: CLINIC | Age: 75
End: 2025-05-05
Payer: OTHER GOVERNMENT

## 2025-05-05 VITALS
OXYGEN SATURATION: 94 % | BODY MASS INDEX: 44.1 KG/M2 | DIASTOLIC BLOOD PRESSURE: 51 MMHG | HEIGHT: 71 IN | HEART RATE: 80 BPM | WEIGHT: 315 LBS | SYSTOLIC BLOOD PRESSURE: 75 MMHG

## 2025-05-05 VITALS
SYSTOLIC BLOOD PRESSURE: 161 MMHG | DIASTOLIC BLOOD PRESSURE: 94 MMHG | RESPIRATION RATE: 17 BRPM | WEIGHT: 315 LBS | HEART RATE: 75 BPM | HEIGHT: 71 IN | BODY MASS INDEX: 44.1 KG/M2 | OXYGEN SATURATION: 97 % | TEMPERATURE: 98 F

## 2025-05-05 DIAGNOSIS — I48.91 ATRIAL FIBRILLATION: ICD-10-CM

## 2025-05-05 DIAGNOSIS — I95.9 HYPOTENSION, UNSPECIFIED HYPOTENSION TYPE: ICD-10-CM

## 2025-05-05 DIAGNOSIS — I95.1 ORTHOSTASIS: ICD-10-CM

## 2025-05-05 DIAGNOSIS — N28.9 ACUTE RENAL INSUFFICIENCY: ICD-10-CM

## 2025-05-05 DIAGNOSIS — E78.5 HYPERLIPIDEMIA, UNSPECIFIED HYPERLIPIDEMIA TYPE: ICD-10-CM

## 2025-05-05 DIAGNOSIS — I50.33 ACUTE ON CHRONIC DIASTOLIC HEART FAILURE: ICD-10-CM

## 2025-05-05 DIAGNOSIS — Z98.890 HISTORY OF CORONARY ANGIOGRAM: ICD-10-CM

## 2025-05-05 DIAGNOSIS — I48.20 CHRONIC ATRIAL FIBRILLATION: Primary | ICD-10-CM

## 2025-05-05 DIAGNOSIS — I48.19 PERSISTENT ATRIAL FIBRILLATION: Primary | ICD-10-CM

## 2025-05-05 DIAGNOSIS — G47.33 OSA (OBSTRUCTIVE SLEEP APNEA): ICD-10-CM

## 2025-05-05 DIAGNOSIS — E66.01 MORBID OBESITY: ICD-10-CM

## 2025-05-05 LAB
ABSOLUTE EOSINOPHIL (OHS): 0.1 K/UL
ABSOLUTE MONOCYTE (OHS): 0.8 K/UL (ref 0.3–1)
ABSOLUTE NEUTROPHIL COUNT (OHS): 3.77 K/UL (ref 1.8–7.7)
ALBUMIN SERPL BCP-MCNC: 4 G/DL (ref 3.5–5.2)
ALP SERPL-CCNC: 52 UNIT/L (ref 40–150)
ALT SERPL W/O P-5'-P-CCNC: 55 UNIT/L (ref 10–44)
ANION GAP (OHS): 13 MMOL/L (ref 8–16)
AST SERPL-CCNC: 56 UNIT/L (ref 11–45)
BASOPHILS # BLD AUTO: 0.06 K/UL
BASOPHILS NFR BLD AUTO: 0.9 %
BILIRUB SERPL-MCNC: 0.7 MG/DL (ref 0.1–1)
BILIRUB UR QL STRIP.AUTO: NEGATIVE
BNP SERPL-MCNC: 96 PG/ML (ref 0–99)
BUN SERPL-MCNC: 20 MG/DL (ref 8–23)
CALCIUM SERPL-MCNC: 9.2 MG/DL (ref 8.7–10.5)
CHLORIDE SERPL-SCNC: 100 MMOL/L (ref 95–110)
CLARITY UR: CLEAR
CO2 SERPL-SCNC: 27 MMOL/L (ref 23–29)
COLOR UR AUTO: YELLOW
CREAT SERPL-MCNC: 1.7 MG/DL (ref 0.5–1.4)
ERYTHROCYTE [DISTWIDTH] IN BLOOD BY AUTOMATED COUNT: 12.6 % (ref 11.5–14.5)
GFR SERPLBLD CREATININE-BSD FMLA CKD-EPI: 42 ML/MIN/1.73/M2
GLUCOSE SERPL-MCNC: 79 MG/DL (ref 70–110)
GLUCOSE UR QL STRIP: NEGATIVE
HCT VFR BLD AUTO: 44.8 % (ref 40–54)
HGB BLD-MCNC: 16.2 GM/DL (ref 14–18)
HGB UR QL STRIP: NEGATIVE
HOLD SPECIMEN: NORMAL
IMM GRANULOCYTES # BLD AUTO: 0.03 K/UL (ref 0–0.04)
IMM GRANULOCYTES NFR BLD AUTO: 0.4 % (ref 0–0.5)
KETONES UR QL STRIP: NEGATIVE
LEUKOCYTE ESTERASE UR QL STRIP: NEGATIVE
LYMPHOCYTES # BLD AUTO: 1.96 K/UL (ref 1–4.8)
MAGNESIUM SERPL-MCNC: 2 MG/DL (ref 1.6–2.6)
MCH RBC QN AUTO: 34.1 PG (ref 27–31)
MCHC RBC AUTO-ENTMCNC: 36.2 G/DL (ref 32–36)
MCV RBC AUTO: 94 FL (ref 82–98)
NITRITE UR QL STRIP: NEGATIVE
NUCLEATED RBC (/100WBC) (OHS): 0 /100 WBC
OHS QRS DURATION: 114 MS
OHS QTC CALCULATION: 551 MS
PH UR STRIP: 6 [PH]
PLATELET # BLD AUTO: 203 K/UL (ref 150–450)
PMV BLD AUTO: 10.3 FL (ref 9.2–12.9)
POTASSIUM SERPL-SCNC: 3.6 MMOL/L (ref 3.5–5.1)
PROT SERPL-MCNC: 7.3 GM/DL (ref 6–8.4)
PROT UR QL STRIP: ABNORMAL
RBC # BLD AUTO: 4.75 M/UL (ref 4.6–6.2)
RELATIVE EOSINOPHIL (OHS): 1.5 %
RELATIVE LYMPHOCYTE (OHS): 29.2 % (ref 18–48)
RELATIVE MONOCYTE (OHS): 11.9 % (ref 4–15)
RELATIVE NEUTROPHIL (OHS): 56.1 % (ref 38–73)
SODIUM SERPL-SCNC: 140 MMOL/L (ref 136–145)
SP GR UR STRIP: 1.02
TROPONIN I SERPL DL<=0.01 NG/ML-MCNC: <0.006 NG/ML
UROBILINOGEN UR STRIP-ACNC: NEGATIVE EU/DL
WBC # BLD AUTO: 6.72 K/UL (ref 3.9–12.7)

## 2025-05-05 PROCEDURE — 96361 HYDRATE IV INFUSION ADD-ON: CPT

## 2025-05-05 PROCEDURE — 93010 ELECTROCARDIOGRAM REPORT: CPT | Mod: ,,, | Performed by: INTERNAL MEDICINE

## 2025-05-05 PROCEDURE — 85025 COMPLETE CBC W/AUTO DIFF WBC: CPT | Performed by: EMERGENCY MEDICINE

## 2025-05-05 PROCEDURE — 99285 EMERGENCY DEPT VISIT HI MDM: CPT | Mod: 25,27

## 2025-05-05 PROCEDURE — 99215 OFFICE O/P EST HI 40 MIN: CPT | Mod: PBBFAC,25 | Performed by: INTERNAL MEDICINE

## 2025-05-05 PROCEDURE — 96360 HYDRATION IV INFUSION INIT: CPT

## 2025-05-05 PROCEDURE — 84484 ASSAY OF TROPONIN QUANT: CPT | Performed by: EMERGENCY MEDICINE

## 2025-05-05 PROCEDURE — 99999 PR PBB SHADOW E&M-EST. PATIENT-LVL V: CPT | Mod: PBBFAC,,, | Performed by: INTERNAL MEDICINE

## 2025-05-05 PROCEDURE — 83880 ASSAY OF NATRIURETIC PEPTIDE: CPT | Performed by: EMERGENCY MEDICINE

## 2025-05-05 PROCEDURE — 25000003 PHARM REV CODE 250: Performed by: EMERGENCY MEDICINE

## 2025-05-05 PROCEDURE — 93005 ELECTROCARDIOGRAM TRACING: CPT

## 2025-05-05 PROCEDURE — 81003 URINALYSIS AUTO W/O SCOPE: CPT | Performed by: EMERGENCY MEDICINE

## 2025-05-05 PROCEDURE — 99215 OFFICE O/P EST HI 40 MIN: CPT | Mod: S$PBB,,, | Performed by: INTERNAL MEDICINE

## 2025-05-05 PROCEDURE — 82040 ASSAY OF SERUM ALBUMIN: CPT | Performed by: EMERGENCY MEDICINE

## 2025-05-05 PROCEDURE — 83735 ASSAY OF MAGNESIUM: CPT | Performed by: EMERGENCY MEDICINE

## 2025-05-05 RX ADMIN — SODIUM CHLORIDE 500 ML: 9 INJECTION, SOLUTION INTRAVENOUS at 06:05

## 2025-05-05 NOTE — ED PROVIDER NOTES
SCRIBE #1 NOTE: I, Laura Sands, am scribing for, and in the presence of, Marixa Heck MD. I have scribed the entire note.       History     Chief Complaint   Patient presents with    Hypotension     Per ems pt is coming from the [facility] because while he was being seen at his cardiologist office his BP was noted to be very low. Pt has a hx of hypotension and AFIB for the past year.      Review of patient's allergies indicates:  No Known Allergies      History of Present Illness     HPI    5/5/2025, 4:02 PM  History obtained from the patient and medical records      History of Present Illness: Lucas Garrison is a 74 y.o. male patient with a PMHx of atrial fibrillation, prostate cancer, DDD, HTN, lumbago, REGINA, peripheral neuropathy, and PTSD who presents to the Emergency Department after referral from his cardiologist. Pt saw Dr. Davalos (Cardiology) today at the [facility] for a routine f/u and was told to come to the ED due to hypotensive BP readings and a near syncopal episode. Pt reports history of 2 ablations and 2 cardioversions for his A-fib. He does note that his baseline SINGH has been worsening recently. Patient denies any CP, weight gain, orthopnea, pnd, no nausea or vomiting. No prior Tx specified.  No further complaints or concerns at this time.       Arrival mode: Ambulance Service    PCP: [facility]        Past Medical History:  Past Medical History:   Diagnosis Date    atrial fibrillation     Cancer     DDD (degenerative disc disease)     H/O prostate cancer     HLD (hyperlipidemia)     Hypertension     Lumbago     REGINA (obstructive sleep apnea)     Peripheral neuropathy     PTSD (post-traumatic stress disorder)        Past Surgical History:  Past Surgical History:   Procedure Laterality Date    ABLATION OF ARRHYTHMOGENIC FOCUS FOR ATRIAL FIBRILLATION N/A 10/26/2023    Procedure: Ablation atrial fibrillation;  Surgeon: Jacobo Marquez MD;  Location: [facility] EP LAB;   Service: Cardiology;  Laterality: N/A;  afib, PVI, RFA, NATIVIDAD (cx if SR), CHELSI, anes, MB, 3 Prep    ABLATION OF ARRHYTHMOGENIC FOCUS FOR ATRIAL FIBRILLATION N/A 4/15/2024    Procedure: Ablation atrial fibrillation;  Surgeon: Jacobo Marquez MD;  Location: [facility] EP LAB;  Service: Cardiology;  Laterality: N/A;  afib, redo PVI,PWI,cti, RFA, NATIVIDAD (cx if SR), CHELSI, anes, MB, 3 Prep    ABLATION, ATRIAL FLUTTER, TYPICAL  4/15/2024    Procedure: Ablation, Atrial Flutter, Typical;  Surgeon: Jacobo Marquez MD;  Location: [facility] EP LAB;  Service: Cardiology;;    CATHETERIZATION OF BOTH LEFT AND RIGHT HEART N/A 6/4/2021    Procedure: CATHETERIZATION, HEART, BOTH LEFT AND RIGHT;  Surgeon: Baldemar Davalos MD;  Location: [facility] CATH LAB;  Service: Cardiology;  Laterality: N/A;    COLONOSCOPY N/A 12/7/2016    Procedure: COLONOSCOPY;  Surgeon: Zeus Guerrier MD;  Location: [facility];  Service: Endoscopy;  Laterality: N/A;    COLONOSCOPY N/A 2/22/2021    Procedure: COLONOSCOPY;  Surgeon: Joselyn Castanon MD;  Location: [facility];  Service: Endoscopy;  Laterality: N/A;    CORONARY ANGIOGRAPHY N/A 6/4/2021    Procedure: ANGIOGRAM, CORONARY ARTERY;  Surgeon: Baldemar Davalos MD;  Location: [facility] CATH LAB;  Service: Cardiology;  Laterality: N/A;    ECHOCARDIOGRAM,TRANSESOPHAGEAL N/A 10/26/2023    Procedure: Transesophageal echo (NATIVIDAD) intra-procedure log documentation;  Surgeon: Elisha Mac MD;  Location: [facility] EP LAB;  Service: Cardiology;  Laterality: N/A;    ECHOCARDIOGRAM,TRANSESOPHAGEAL N/A 4/15/2024    Procedure: Transesophageal echo (NATIVIDAD) intra-procedure log documentation;  Surgeon: Marie Lan MD;  Location: [facility] EP LAB;  Service: Cardiology;  Laterality: N/A;    prostate radiation seeds  2010    RIGHT HEART CATHETERIZATION Right 6/4/2021    Procedure: INSERTION, CATHETER, RIGHT HEART;  Surgeon: Baldemar Davalos MD;  Location: [facility] CATH LAB;  Service: Cardiology;  Laterality: Right;    TONSILLECTOMY       TRANSESOPHAGEAL ECHOCARDIOGRAM WITH POSSIBLE CARDIOVERSION (NATIVIDAD W/ POSS CARDIOVERSION) N/A 2023    Procedure: Transesophageal echo (NATIVIDAD) intra-procedure log documentation;  Surgeon: Baldemar Davalos MD;  Location: [facility] CATH LAB;  Service: Cardiology;  Laterality: N/A;    TRANSURETHRAL RESECTION OF PROSTATE      TREATMENT OF CARDIAC ARRHYTHMIA N/A 8/3/2023    Procedure: Cardioversion or Defibrillation;  Surgeon: Baldemar Davalos MD;  Location: [facility] CATH LAB;  Service: Cardiology;  Laterality: N/A;    TREATMENT OF CARDIAC ARRHYTHMIA  10/26/2023    Procedure: Cardioversion or Defibrillation;  Surgeon: Jacobo Marquez MD;  Location: [facility] EP LAB;  Service: Cardiology;;    TREATMENT OF CARDIAC ARRHYTHMIA  4/15/2024    Procedure: Cardioversion or Defibrillation;  Surgeon: Jacobo Marquez MD;  Location: [facility] EP LAB;  Service: Cardiology;;         Family History:  Family History   Problem Relation Name Age of Onset    COPD Mother      Depression Brother      Colon cancer Neg Hx         Social History:  Social History     Tobacco Use    Smoking status: Former     Current packs/day: 0.00     Average packs/day: 1.5 packs/day for 10.0 years (15.0 ttl pk-yrs)     Types: Cigarettes     Start date: 10/30/1972     Quit date: 10/30/1982     Years since quittin.5    Smokeless tobacco: Never   Substance and Sexual Activity    Alcohol use: No    Drug use: No    Sexual activity: Not on file        Review of Systems     Review of Systems   Constitutional:  Negative for fever.        (+) hypotension   HENT:  Negative for sore throat.    Respiratory:  Positive for shortness of breath.    Cardiovascular:  Negative for chest pain.   Gastrointestinal:  Negative for nausea and vomiting.   Genitourinary:  Negative for dysuria.   Musculoskeletal:  Negative for back pain.   Skin:  Negative for rash.   Neurological:  Negative for weakness.        (+) near syncopal episode   Hematological:  Does not bruise/bleed easily.  "  All other systems reviewed and are negative.     Physical Exam     Initial Vitals [05/05/25 1425]   BP Pulse Resp Temp SpO2   (!) 90/50 85 16 98.9 °F (37.2 °C) 98 %      MAP       --          Physical Exam  Nursing Notes and Vital Signs Reviewed.  Constitutional: Patient is in no acute distress. Morbidly obese. Poor historian.  Head: Atraumatic. Normocephalic.  Eyes: PERRL. EOM intact. Conjunctivae are not pale. No scleral icterus.  ENT: Mucous membranes are moist. Oropharynx is clear and symmetric.    Neck: Supple. Full ROM. No lymphadenopathy.  Cardiovascular: Irregularly irregular rhythm. No murmurs, rubs, or gallops. Distal pulses are 2+ and symmetric.  Pulmonary/Chest: No respiratory distress. Clear to auscultation bilaterally. No wheezing or rales.  Abdominal: Soft and non-distended.  There is no tenderness.  No rebound, guarding, or rigidity. Good bowel sounds.  Genitourinary: No CVA tenderness.  Musculoskeletal: Moves all extremities. No obvious deformities. No edema. No calf tenderness.  Skin: Warm and dry.  Neurological:  Alert, awake, and appropriate.  Normal speech.  No acute focal neurological deficits are appreciated.  Psychiatric: Normal affect. Good eye contact. Appropriate in content.     ED Course   Procedures  ED Vital Signs:  Vitals:    05/05/25 1425 05/05/25 1530 05/05/25 1532 05/05/25 1613   BP: (!) 90/50  103/61    Pulse: 85 84     Resp: 16 11     Temp: 98.9 °F (37.2 °C)      TempSrc: Oral      SpO2: 98% 95%     Weight:    (!) 147.6 kg (325 lb 6.4 oz)   Height: 5' 11" (1.803 m)       05/05/25 1627 05/05/25 1629 05/05/25 1631 05/05/25 1702   BP: 108/71 113/73 130/65 139/78   Pulse: 73 92 93 81   Resp:    14   Temp:       TempSrc:       SpO2: 97% 97% 96% 96%   Weight:       Height:        05/05/25 1802 05/05/25 1803 05/05/25 1831 05/05/25 1832   BP: (!) 166/94   (!) 149/94   Pulse:  81 77    Resp:  13 15    Temp:       TempSrc:       SpO2:  96% 95%    Weight:       Height:           Abnormal " Lab Results:  Labs Reviewed   COMPREHENSIVE METABOLIC PANEL - Abnormal       Result Value    Sodium 140      Potassium 3.6      Chloride 100      CO2 27      Glucose 79      BUN 20      Creatinine 1.7 (*)     Calcium 9.2      Protein Total 7.3      Albumin 4.0      Bilirubin Total 0.7      ALP 52      AST 56 (*)     ALT 55 (*)     Anion Gap 13      eGFR 42 (*)    CBC WITH DIFFERENTIAL - Abnormal    WBC 6.72      RBC 4.75      HGB 16.2      HCT 44.8      MCV 94      MCH 34.1 (*)     MCHC 36.2 (*)     RDW 12.6      Platelet Count 203      MPV 10.3      Nucleated RBC 0      Neut % 56.1      Lymph % 29.2      Mono % 11.9      Eos % 1.5      Basophil % 0.9      Imm Grans % 0.4      Neut # 3.77      Lymph # 1.96      Mono # 0.80      Eos # 0.10      Baso # 0.06      Imm Grans # 0.03     URINALYSIS, REFLEX TO URINE CULTURE - Abnormal    Color, UA Yellow      Appearance, UA Clear      pH, UA 6.0      Spec Grav UA 1.020      Protein, UA Trace (*)     Glucose, UA Negative      Ketones, UA Negative      Bilirubin, UA Negative      Blood, UA Negative      Nitrites, UA Negative      Urobilinogen, UA Negative      Leukocyte Esterase, UA Negative     TROPONIN I - Normal    Troponin-I <0.006     B-TYPE NATRIURETIC PEPTIDE - Normal    BNP 96     MAGNESIUM - Normal    Magnesium  2.0     CBC W/ AUTO DIFFERENTIAL    Narrative:     The following orders were created for panel order CBC auto differential.  Procedure                               Abnormality         Status                     ---------                               -----------         ------                     CBC with Differential[[id number]]       Abnormal            Final result                 Please view results for these tests on the individual orders.   GREY TOP URINE HOLD    Extra Tube Hold for add-ons.          All Lab Results:  Results for orders placed or performed during the hospital encounter of 05/05/25   Comprehensive metabolic panel    Collection Time:  05/05/25  4:00 PM   Result Value Ref Range    Sodium 140 136 - 145 mmol/L    Potassium 3.6 3.5 - 5.1 mmol/L    Chloride 100 95 - 110 mmol/L    CO2 27 23 - 29 mmol/L    Glucose 79 70 - 110 mg/dL    BUN 20 8 - 23 mg/dL    Creatinine 1.7 (H) 0.5 - 1.4 mg/dL    Calcium 9.2 8.7 - 10.5 mg/dL    Protein Total 7.3 6.0 - 8.4 gm/dL    Albumin 4.0 3.5 - 5.2 g/dL    Bilirubin Total 0.7 0.1 - 1.0 mg/dL    ALP 52 40 - 150 unit/L    AST 56 (H) 11 - 45 unit/L    ALT 55 (H) 10 - 44 unit/L    Anion Gap 13 8 - 16 mmol/L    eGFR 42 (L) >60 mL/min/1.73/m2   Troponin I #1    Collection Time: 05/05/25  4:00 PM   Result Value Ref Range    Troponin-I <0.006 <=0.026 ng/mL   BNP    Collection Time: 05/05/25  4:00 PM   Result Value Ref Range    BNP 96 0 - 99 pg/mL   Magnesium    Collection Time: 05/05/25  4:00 PM   Result Value Ref Range    Magnesium  2.0 1.6 - 2.6 mg/dL   CBC with Differential    Collection Time: 05/05/25  4:00 PM   Result Value Ref Range    WBC 6.72 3.90 - 12.70 K/uL    RBC 4.75 4.60 - 6.20 M/uL    HGB 16.2 14.0 - 18.0 gm/dL    HCT 44.8 40.0 - 54.0 %    MCV 94 82 - 98 fL    MCH 34.1 (H) 27.0 - 31.0 pg    MCHC 36.2 (H) 32.0 - 36.0 g/dL    RDW 12.6 11.5 - 14.5 %    Platelet Count 203 150 - 450 K/uL    MPV 10.3 9.2 - 12.9 fL    Nucleated RBC 0 <=0 /100 WBC    Neut % 56.1 38 - 73 %    Lymph % 29.2 18 - 48 %    Mono % 11.9 4 - 15 %    Eos % 1.5 <=8 %    Basophil % 0.9 <=1.9 %    Imm Grans % 0.4 0.0 - 0.5 %    Neut # 3.77 1.8 - 7.7 K/uL    Lymph # 1.96 1 - 4.8 K/uL    Mono # 0.80 0.3 - 1 K/uL    Eos # 0.10 <=0.5 K/uL    Baso # 0.06 <=0.2 K/uL    Imm Grans # 0.03 0.00 - 0.04 K/uL   EKG 12-lead    Collection Time: 05/05/25  4:21 PM   Result Value Ref Range    QRS Duration 114 ms    [facility] QTC Calculation 551 ms   Urinalysis, Reflex to Urine Culture Urine, Clean Catch    Collection Time: 05/05/25  4:37 PM    Specimen: Urine, Clean Catch   Result Value Ref Range    Color, UA Yellow Straw, Silvia, Yellow, Light-Orange    Appearance, UA  Clear Clear    pH, UA 6.0 5.0 - 8.0    Spec Grav UA 1.020 1.005 - 1.030    Protein, UA Trace (A) Negative    Glucose, UA Negative Negative    Ketones, UA Negative Negative    Bilirubin, UA Negative Negative    Blood, UA Negative Negative    Nitrites, UA Negative Negative    Urobilinogen, UA Negative <2.0 EU/dL    Leukocyte Esterase, UA Negative Negative   GREY TOP URINE HOLD    Collection Time: 05/05/25  4:37 PM   Result Value Ref Range    Extra Tube Hold for add-ons.        Imaging Results:  Imaging Results              X-Ray Chest AP Portable (Final result)  Result time 05/05/25 16:56:02      Final result by Annamaria Sandoval ([facility]), MD (05/05/25 16:56:02)                   Impression:      Clear lungs      Electronically signed by: Annamaria Sandoval MD  Date:    05/05/2025  Time:    16:56               Narrative:    EXAMINATION:  XR CHEST AP PORTABLE    CLINICAL HISTORY:  , Atrial fib;    COMPARISON:  None    FINDINGS:  One view.  Mild cardiomegaly.  Clear lungs.                                       The EKG was ordered, reviewed, and independently interpreted by the ED provider.  Interpretation time: 16:21  Rate: 88 BPM  Rhythm: atrial fibrillation  Interpretation: Nonspecific ST and T wave abnormality. Prolonged QT. No STEMI.           The Emergency Provider reviewed the vital signs and test results, which are outlined above.     ED Discussion     Patient does not look too volume overloaded. He was mildly orthostatic and had creatinine of 1.7. He clinically appears more on the dry side. We will give him gentle fluids and he otherwise will be stable for discharge.     7:03 PM: Reassessed pt at this time. Discussed with patient and/or family/caretaker all pertinent ED information and results. Discussed pt dx and plan of tx. Gave the patient all f/u and return to the ED instructions. All questions and concerns were addressed at this time. Patient and/or family/caretaker expresses understanding of information and  instructions, and is comfortable with plan to discharge. Pt is stable for discharge.     I discussed with patient and/or family/caretaker that evaluation in the ED does not suggest any emergent or life threatening medical conditions requiring immediate intervention beyond what was provided in the ED, and I believe patient is safe for discharge.  Regardless, an unremarkable evaluation in the ED does not preclude the development or presence of a serious of life threatening condition. As such, I instructed that the patient is to return immediately for any worsening or change in current symptoms.       Medical Decision Making  DDX: 1. Dehydration 2. Orthostasis 3. Kidney failure 4. Infection    ECG chronic atrial fib, rate controlled, no change from baseline, wbc normal, h/h stable, mild renal insufficiency, patient orthostatic, given gentle hydration, VSS, CXR normal, cardiac markers normal, discussed with Dr. Davalos home meds adjusted, patient stable for discharge.     Amount and/or Complexity of Data Reviewed  Labs: ordered. Decision-making details documented in ED Course.  Radiology: ordered. Decision-making details documented in ED Course.  ECG/medicine tests: ordered and independent interpretation performed. Decision-making details documented in ED Course.  Discussion of management or test interpretation with external provider(s): Spoke with Dr. Davalos. He recommends holding Norvasc, stopping Metolazone, and giving pt Lasix as needed.    Risk  Decision regarding hospitalization.  Diagnosis or treatment significantly limited by social determinants of health.                ED Medication(s):  Medications   sodium chloride 0.9% bolus 500 mL 500 mL (500 mLs Intravenous New Bag 5/5/25 1802)       Current Discharge Medication List           Follow-up Information       Baldemar Davalos MD. Schedule an appointment as soon as possible for a visit in 3 days.    Specialties: Interventional Cardiology,  Cardiology  Contact information:  [address] [facility] [address]  [phone]                                 Scribe Attestation:   Scribe #1: I performed the above scribed service and the documentation accurately describes the services I performed. I attest to the accuracy of the note.     Attending:   Physician Attestation Statement for Scribe #1: I, Marixa Heck MD, personally performed the services described in this documentation, as scribed by Laura Sands, in my presence, and it is both accurate and complete.           Clinical Impression       ICD-10-CM ICD-9-CM   1. Chronic atrial fibrillation  I48.20 427.31   2. Atrial fibrillation  I48.91 427.31   3. Acute renal insufficiency  N28.9 593.9   4. Orthostasis  I95.1 458.0       Disposition:   Disposition: Discharged  Condition: Stable         Marixa Heck MD  05/14/25 [id number]

## 2025-05-05 NOTE — PROGRESS NOTES
Subjective:   Patient ID:  Lucas Garrison is a 74 y.o. male who presents for evaluation of Dizziness and Shortness of Breath      Shortness of Breath  Associated symptoms include chest pain and leg swelling. Pertinent negatives include no syncope.   Dizziness:    Associated symptoms: chest pain.no syncope and no palpitations.  Chest Pain   Associated symptoms include dizziness and malaise/fatigue. Pertinent negatives include no palpitations, shortness of breath or syncope.     May 5, 2025.    Comes in for six-month follow-up.    Dyspneic.  Fluid overloaded.    Hypotensive initial BP was 75/51.  Repeat was 70/50.  States only taking metolazone twice a week not Lasix due to misunderstanding and [facility] not ref[facility]  Near-syncope.    EMS called to transfer to the emergency room.       10.7.2024  Comes in for follow-up.    No major changes to his symptoms.  He states at times he get more leg swelling but it goes away with the Lasix.  However his dyspnea is unchanged.    He has a left deltopectoral pain tender on exam.    He is noted to be irregular on exam.  EKG confirms AFib.    His last visit with Dr. Marquez was in June, EKG unclear if AFib or sinus.      3.7.2024  73-year-old male with past medical history below.  History of AFib status post 2 cardioversions and an ablation in October 2023 lastly.    Continues to go back in to AFib.  With symptoms of fatigue.  With plan to have a repeat ablation next month with Dr. Marquez.    Compliant with the medications.    We had stopped amiodarone and flecainide in the past due to severe bradycardia.    No bleeding.    Past Medical History:   Diagnosis Date    atrial fibrillation     Cancer     DDD (degenerative disc disease)     H/O prostate cancer     HLD (hyperlipidemia)     Hypertension     Lumbago     REGINA (obstructive sleep apnea)     Peripheral neuropathy     PTSD (post-traumatic stress disorder)        Past Surgical History:   Procedure Laterality Date     ABLATION OF ARRHYTHMOGENIC FOCUS FOR ATRIAL FIBRILLATION N/A 10/26/2023    Procedure: Ablation atrial fibrillation;  Surgeon: Jacobo Marquez MD;  Location: [facility] EP LAB;  Service: Cardiology;  Laterality: N/A;  afib, PVI, RFA, NATIVIDAD (cx if SR), CHELSI, anes, MB, 3 Prep    ABLATION OF ARRHYTHMOGENIC FOCUS FOR ATRIAL FIBRILLATION N/A 4/15/2024    Procedure: Ablation atrial fibrillation;  Surgeon: Jacobo Marquez MD;  Location: [facility] EP LAB;  Service: Cardiology;  Laterality: N/A;  afib, redo PVI,PWI,cti, RFA, NATIVIDAD (cx if SR), CHELSI, anes, MB, 3 Prep    ABLATION, ATRIAL FLUTTER, TYPICAL  4/15/2024    Procedure: Ablation, Atrial Flutter, Typical;  Surgeon: Jacobo Marquez MD;  Location: [facility] EP LAB;  Service: Cardiology;;    CATHETERIZATION OF BOTH LEFT AND RIGHT HEART N/A 6/4/2021    Procedure: CATHETERIZATION, HEART, BOTH LEFT AND RIGHT;  Surgeon: Baldemar Davalos MD;  Location: [facility] CATH LAB;  Service: Cardiology;  Laterality: N/A;    COLONOSCOPY N/A 12/7/2016    Procedure: COLONOSCOPY;  Surgeon: Zeus Guerrier MD;  Location: [facility];  Service: Endoscopy;  Laterality: N/A;    COLONOSCOPY N/A 2/22/2021    Procedure: COLONOSCOPY;  Surgeon: Joselyn Castanon MD;  Location: [facility] ENDO;  Service: Endoscopy;  Laterality: N/A;    CORONARY ANGIOGRAPHY N/A 6/4/2021    Procedure: ANGIOGRAM, CORONARY ARTERY;  Surgeon: Baldemar Davalos MD;  Location: [facility] CATH LAB;  Service: Cardiology;  Laterality: N/A;    ECHOCARDIOGRAM,TRANSESOPHAGEAL N/A 10/26/2023    Procedure: Transesophageal echo (NATIVIDAD) intra-procedure log documentation;  Surgeon: Elisha Mac MD;  Location: [facility] EP LAB;  Service: Cardiology;  Laterality: N/A;    ECHOCARDIOGRAM,TRANSESOPHAGEAL N/A 4/15/2024    Procedure: Transesophageal echo (NATIVIDAD) intra-procedure log documentation;  Surgeon: Marie Lan MD;  Location: [facility] EP LAB;  Service: Cardiology;  Laterality: N/A;    prostate radiation seeds  2010    RIGHT HEART CATHETERIZATION Right  2021    Procedure: INSERTION, CATHETER, RIGHT HEART;  Surgeon: Baldemar Davalos MD;  Location: [facility] CATH LAB;  Service: Cardiology;  Laterality: Right;    TONSILLECTOMY      TRANSESOPHAGEAL ECHOCARDIOGRAM WITH POSSIBLE CARDIOVERSION (NATIVIDAD W/ POSS CARDIOVERSION) N/A 2023    Procedure: Transesophageal echo (NATIVIDAD) intra-procedure log documentation;  Surgeon: Baldemar Davalos MD;  Location: [facility] CATH LAB;  Service: Cardiology;  Laterality: N/A;    TRANSURETHRAL RESECTION OF PROSTATE      TREATMENT OF CARDIAC ARRHYTHMIA N/A 8/3/2023    Procedure: Cardioversion or Defibrillation;  Surgeon: Baldemar Davalos MD;  Location: [facility] CATH LAB;  Service: Cardiology;  Laterality: N/A;    TREATMENT OF CARDIAC ARRHYTHMIA  10/26/2023    Procedure: Cardioversion or Defibrillation;  Surgeon: Jcaobo Marquez MD;  Location: [facility] EP LAB;  Service: Cardiology;;    TREATMENT OF CARDIAC ARRHYTHMIA  4/15/2024    Procedure: Cardioversion or Defibrillation;  Surgeon: Jacobo Marquez MD;  Location: [facility] EP LAB;  Service: Cardiology;;       Social History     Tobacco Use    Smoking status: Former     Current packs/day: 0.00     Average packs/day: 1.5 packs/day for 10.0 years (15.0 ttl pk-yrs)     Types: Cigarettes     Start date: 10/30/1972     Quit date: 10/30/1982     Years since quittin.5    Smokeless tobacco: Never   Substance Use Topics    Alcohol use: No    Drug use: No       Family History   Problem Relation Name Age of Onset    COPD Mother      Depression Brother      Colon cancer Neg Hx         Review of Systems   Constitutional: Positive for malaise/fatigue.   Cardiovascular:  Positive for chest pain and leg swelling. Negative for dyspnea on exertion, palpitations and syncope.   Respiratory:  Negative for shortness of breath.    Genitourinary: Negative.    Neurological:  Positive for dizziness.       Current Outpatient Medications on File Prior to Visit   Medication Sig    albuterol (PROVENTIL/VENTOLIN HFA) 90  mcg/actuation inhaler INHALE 2 PUFFS BY MOUTH FOUR TIMES A DAY AS NEEDED FOR BRONCHOSPASM PREVENTION    amitriptyline (ELAVIL) 50 MG tablet Take 50 mg by mouth every evening.    amLODIPine (NORVASC) 10 MG tablet Take 1 tablet (10 mg total) by mouth once daily.    apixaban (ELIQUIS) 5 mg Tab Take 1 tablet (5 mg total) by mouth 2 (two) times daily.    aspirin (ECOTRIN) 81 MG EC tablet Take 81 mg by mouth once daily.    bisoprolol (ZEBETA) 5 MG tablet Take 5 mg by mouth once daily.    chlorhexidine (HIBICLENS) 4 % external liquid APPLY SMALL AMOUNT TOPICALLY EVERY DAY - USE TO WASH HAIR/SCALP EVERY DAY - THOROUGHLY RINSE THE AREA TO BE CLEANED WITH WATER. APPLY THE MINIMUM AMOUNT OF PRODUCT NECESSARY TO COVER THE SKIN  AND WASH GENTLY.RINSE AGAIN THOROUGHLY    clobetasoL (TEMOVATE) 0.05 % external solution APPLY MODERATE AMOUNT TOPICALLY TWICE A DAY AS NEEDED TO THE SCALP    EScitalopram oxalate (LEXAPRO) 20 MG tablet Take 20 mg by mouth once daily.    fish oil-omega-3 fatty acids 300-1,000 mg capsule Take 1 g by mouth once daily. (Patient not taking: Reported on 10/7/2024)    flecainide (TAMBOCOR) 100 MG Tab Take 100 mg by mouth every 12 (twelve) hours. (Patient not taking: Reported on 5/5/2025)    furosemide (LASIX) 40 MG tablet Take 2 tablets (80 mg total) by mouth 2 (two) times daily.    gabapentin (NEURONTIN) 600 MG tablet Take 600 mg by mouth.    loratadine (CLARITIN) 10 mg tablet Take 10 mg by mouth once daily.    losartan (COZAAR) 100 MG tablet Take 1 tablet (100 mg total) by mouth once daily.    methocarbamol (ROBAXIN) 750 MG Tab Take 500 mg by mouth 3 (three) times daily.    metOLazone (ZAROXOLYN) 2.5 MG tablet Take 1 tablet (2.5 mg total) by mouth twice a week.    mirtazapine (REMERON) 30 MG tablet Take 30 mg by mouth every evening.    multivitamin capsule Take 1 capsule by mouth once daily.    naproxen (NAPROSYN) 500 MG tablet Take 500 mg by mouth 2 (two) times daily with meals.    omeprazole (PRILOSEC) 20  "MG capsule Take 1 capsule (20 mg total) by mouth once daily.    pravastatin (PRAVACHOL) 80 MG tablet Take 1 tablet (80 mg total) by mouth once daily.    prazosin (MINIPRESS) 5 MG capsule Take 5 mg by mouth 2 (two) times daily.    QUEtiapine (SEROQUEL) 50 MG tablet Take 50 mg by mouth nightly. One-half tablet    traZODone (DESYREL) 50 MG tablet Take 50 mg by mouth every evening.    zolpidem (AMBIEN) 5 MG Tab Take 1 tablet (5 mg total) by mouth nightly as needed (INSOMNIA). (Patient taking differently: Take 10 mg by mouth nightly as needed (INSOMNIA).)     No current facility-administered medications on file prior to visit.       Objective:   Objective:  Wt Readings from Last 3 Encounters:   05/05/25 (!) 148.9 kg (328 lb 4.2 oz)   01/08/25 (!) 148.9 kg (328 lb 4.2 oz)   10/07/24 (!) 145.5 kg (320 lb 12.3 oz)     Temp Readings from Last 3 Encounters:   04/16/24 97.6 °F (36.4 °C) (Oral)   10/27/23 98.5 °F (36.9 °C) (Oral)   08/03/23 98.1 °F (36.7 °C)     BP Readings from Last 3 Encounters:   05/05/25 (!) 75/51   01/08/25 (!) 153/89   10/07/24 118/71     Pulse Readings from Last 3 Encounters:   05/05/25 80   01/08/25 78   10/07/24 110       Physical Exam  Vitals reviewed.   Constitutional:       Appearance: He is well-developed.   Neck:      Vascular: No carotid bruit.   Cardiovascular:      Rate and Rhythm: Rhythm irregular.      Pulses: Intact distal pulses.      Heart sounds: Normal heart sounds. No murmur heard.  Pulmonary:      Breath sounds: Normal breath sounds.   Musculoskeletal:         General: Swelling present.   Neurological:      Mental Status: He is oriented to person, place, and time.         No results found for: "CHOL"  No results found for: "HDL"  No results found for: "LDLCALC"  No results found for: "TRIG"  No results found for: "CHOLHDL"    Chemistry        Component Value Date/Time     (H) 04/08/2024 1104    K 4.1 04/08/2024 1104     04/08/2024 1104    CO2 30 (H) 04/08/2024 1104    BUN " "15 04/08/2024 1104    CREATININE 1.4 04/08/2024 1104     04/08/2024 1104        Component Value Date/Time    CALCIUM 9.2 04/08/2024 1104    ALKPHOS 52 (L) 04/06/2021 0729    AST 32 04/06/2021 0729    ALT 45 (H) 04/06/2021 0729    BILITOT 0.7 04/06/2021 0729    ESTGFRAFRICA >60.0 01/03/2022 1234    EGFRNONAA >60.0 01/03/2022 1234          No results found for: "TSH"  Lab Results   Component Value Date    INR 1.1 04/08/2024    INR 1.1 10/19/2023    INR 1.1 07/27/2023     Lab Results   Component Value Date    WBC 4.94 04/08/2024    HGB 15.1 04/08/2024    HCT 43.3 04/08/2024    MCV 97 04/08/2024     04/08/2024     BNP  @LABRCNTIP(BNP,BNPTRIAGEBLO)@  CrCl cannot be calculated (Patient's most recent lab result is older than the maximum 7 days allowed.).     Imaging:  ======  No results found for this or any previous visit.    No results found for this or any previous visit.    No results found for this or any previous visit.    No results found for this or any previous visit.    No results found for this or any previous visit.    No valid procedures specified.    Diagnostic Results:  ECG: Reviewed, nsr after dccv       States he had a normal heart cath in 2010 with Dr Preciado       2021   Left Main   The vessel is large.   Left Anterior Descending   First Diagonal Branch   The vessel is large.   Ramus Intermedius   The vessel is large.   Left Circumflex   The vessel is large.   First Obtuse Marginal Branch   The vessel is large.   Right Coronary Artery   The vessel is large.   Right Posterior Descending Artery   The vessel is large.   Right Posterior Atrioventricular Artery   The vessel is angiographically normal.   Intervention    No interventions have been documented.  Left Heart Findings    Left Ventricle    LVEDP (Pre):15 mmHG   Aortic Valve    No gradient on pullback   Right Heart Findings    Right Heart Pressures    RA: 8 RV: 38/ 8 PA: 36/ 17/ 22 PWP: 15 .   Cardiac output was 8. Cardiac index is 3.22 " L/min/m2.   O2 Sat: PA 75%.       The ASCVD Risk score (April COYLE, et al., 2019) failed to calculate for the following reasons:    The valid systolic blood pressure range is 90 to 200 mmHg    Cannot find a previous HDL lab    Cannot find a previous total cholesterol lab    Assessment and Plan:   Persistent atrial fibrillation    Acute on chronic diastolic heart failure    REGINA (obstructive sleep apnea)    Morbid obesity    Hyperlipidemia, unspecified hyperlipidemia type    History of coronary angiogram    Hypotension, unspecified hypotension type        He is compliant with the CPAP.    Reviewed all tests and above medical conditions with patient in detail and formulated treatment plan.  Chronic AFib.  Failed two PVIs and cardioversion  Rate control strategy   Hypotensive and fluid overloaded.    Near syncopal.  We will send to the emergency room.  Discussed with the ED doctor.    Hold BP meds.  Likely needs IV diuresis  Follow up after discharge

## 2025-05-06 ENCOUNTER — TELEPHONE (OUTPATIENT)
Dept: CARDIOLOGY | Facility: CLINIC | Age: 75
End: 2025-05-06
Payer: OTHER GOVERNMENT

## 2025-05-13 ENCOUNTER — HOSPITAL ENCOUNTER (OUTPATIENT)
Dept: CARDIOLOGY | Facility: HOSPITAL | Age: 75
Discharge: HOME | End: 2025-05-13
Payer: MEDICARE

## 2025-05-13 ENCOUNTER — OFFICE VISIT (OUTPATIENT)
Dept: CARDIOLOGY | Facility: CLINIC | Age: 75
End: 2025-05-13
Payer: OTHER GOVERNMENT

## 2025-05-13 VITALS
DIASTOLIC BLOOD PRESSURE: 112 MMHG | WEIGHT: 315 LBS | HEIGHT: 71 IN | HEART RATE: 106 BPM | BODY MASS INDEX: 44.1 KG/M2 | SYSTOLIC BLOOD PRESSURE: 177 MMHG

## 2025-05-13 VITALS
HEIGHT: 71 IN | WEIGHT: 315 LBS | DIASTOLIC BLOOD PRESSURE: 112 MMHG | SYSTOLIC BLOOD PRESSURE: 177 MMHG | BODY MASS INDEX: 44.1 KG/M2

## 2025-05-13 DIAGNOSIS — I50.33 ACUTE ON CHRONIC DIASTOLIC HEART FAILURE: ICD-10-CM

## 2025-05-13 DIAGNOSIS — I48.0 PAROXYSMAL ATRIAL FIBRILLATION: Primary | ICD-10-CM

## 2025-05-13 DIAGNOSIS — I10 HYPERTENSION, UNSPECIFIED TYPE: ICD-10-CM

## 2025-05-13 DIAGNOSIS — I48.0 PAROXYSMAL ATRIAL FIBRILLATION: ICD-10-CM

## 2025-05-13 DIAGNOSIS — R06.09 DOE (DYSPNEA ON EXERTION): ICD-10-CM

## 2025-05-13 LAB
AORTIC ROOT ANNULUS: 3.4 CM
AORTIC SIZE INDEX (SOV): 1.4 CM/M2
AORTIC SIZE INDEX: 1.3 CM/M2
ASCENDING AORTA: 3.5 CM
AV INDEX (PROSTH): 0.88
AV MEAN GRADIENT: 2 MMHG
AV PEAK GRADIENT: 4 MMHG
AV VALVE AREA BY VELOCITY RATIO: 3.1 CM²
AV VALVE AREA: 3 CM²
AV VELOCITY RATIO: 0.9
BSA FOR ECHO PROCEDURE: 2.75 M2
CV ECHO LV RWT: 0.55 CM
DOP CALC AO PEAK VEL: 1 M/S
DOP CALC AO VTI: 17 CM
DOP CALC LVOT AREA: 3.5 CM2
DOP CALC LVOT DIAMETER: 2.1 CM
DOP CALC LVOT PEAK VEL: 0.9 M/S
DOP CALC LVOT STROKE VOLUME: 51.6 CM3
DOP CALC RVOT PEAK VEL: 0.54 M/S
DOP CALC RVOT VTI: 12.8 CM
DOP CALCLVOT PEAK VEL VTI: 14.9 CM
ECHO LV POSTERIOR WALL: 1.2 CM (ref 0.6–1.1)
FRACTIONAL SHORTENING: 31.8 % (ref 28–44)
INTERVENTRICULAR SEPTUM: 1.2 CM (ref 0.6–1.1)
IVC DIAMETER: 3.06 CM
IVRT: 110 MSEC
LA MAJOR: 6.3 CM
LA MINOR: 6.4 CM
LA WIDTH: 4.8 CM
LEFT ATRIUM AREA SYSTOLIC (APICAL 2 CHAMBER): 33.78 CM2
LEFT ATRIUM AREA SYSTOLIC (APICAL 4 CHAMBER): 28.64 CM2
LEFT ATRIUM SIZE: 4.7 CM
LEFT ATRIUM VOLUME INDEX MOD: 48 ML/M2
LEFT ATRIUM VOLUME INDEX: 46 ML/M2
LEFT ATRIUM VOLUME MOD: 126 ML
LEFT ATRIUM VOLUME: 122 CM3
LEFT INTERNAL DIMENSION IN SYSTOLE: 3 CM (ref 2.1–4)
LEFT VENTRICLE DIASTOLIC VOLUME INDEX: 33.97 ML/M2
LEFT VENTRICLE DIASTOLIC VOLUME: 89 ML
LEFT VENTRICLE END SYSTOLIC VOLUME APICAL 2 CHAMBER: 135.82 ML
LEFT VENTRICLE END SYSTOLIC VOLUME APICAL 4 CHAMBER: 108.71 ML
LEFT VENTRICLE MASS INDEX: 73 G/M2
LEFT VENTRICLE SYSTOLIC VOLUME INDEX: 13 ML/M2
LEFT VENTRICLE SYSTOLIC VOLUME: 34 ML
LEFT VENTRICULAR INTERNAL DIMENSION IN DIASTOLE: 4.4 CM (ref 3.5–6)
LEFT VENTRICULAR MASS: 191.3 G
LVED V (TEICH): 88.68 ML
LVES V (TEICH): 34.13 ML
LVOT MG: 1.59 MMHG
LVOT MV: 0.58 CM/S
OHS CV RV/LV RATIO: 1.09 CM
PISA TR MAX VEL: 3 M/S
PV MEAN GRADIENT: 1 MMHG
RA MAJOR: 6.1 CM
RA PRESSURE ESTIMATED: 8 MMHG
RA WIDTH: 4.07 CM
RIGHT VENTRICLE DIASTOLIC BASEL DIMENSION: 4.8 CM
RIGHT VENTRICULAR END-DIASTOLIC DIMENSION: 4.8 CM
RV TB RVSP: 11 MMHG
SINUS: 3.62 CM
STJ: 3.4 CM
TR MAX PG: 37 MMHG
TRICUSPID ANNULAR PLANE SYSTOLIC EXCURSION: 1.4 CM
TV REST PULMONARY ARTERY PRESSURE: 44 MMHG
Z-SCORE OF LEFT VENTRICULAR DIMENSION IN END DIASTOLE: -14.96
Z-SCORE OF LEFT VENTRICULAR DIMENSION IN END SYSTOLE: -10.62

## 2025-05-13 PROCEDURE — 99999 PR PBB SHADOW E&M-EST. PATIENT-LVL V: CPT | Mod: PBBFAC,,,

## 2025-05-13 PROCEDURE — 99214 OFFICE O/P EST MOD 30 MIN: CPT | Mod: S$PBB,,,

## 2025-05-13 PROCEDURE — 99215 OFFICE O/P EST HI 40 MIN: CPT | Mod: PBBFAC,25

## 2025-05-13 PROCEDURE — 93306 TTE W/DOPPLER COMPLETE: CPT

## 2025-05-13 PROCEDURE — 93306 TTE W/DOPPLER COMPLETE: CPT | Mod: 26,,, | Performed by: INTERNAL MEDICINE

## 2025-05-13 RX ORDER — AMLODIPINE BESYLATE 5 MG/1
5 TABLET ORAL DAILY
Qty: 90 TABLET | Refills: 3 | Status: SHIPPED | OUTPATIENT
Start: 2025-05-13 | End: 2026-05-13

## 2025-05-13 RX ORDER — FUROSEMIDE 40 MG/1
80 TABLET ORAL 2 TIMES DAILY
Qty: 120 TABLET | Refills: 11 | OUTPATIENT
Start: 2025-05-13 | End: 2026-05-13

## 2025-05-13 NOTE — PATIENT INSTRUCTIONS
Starting amlodipine 5mg daily, (1/2 tablet of the ones you have at home which are 10mg tablets)  Check your BP 1 hour after taking your BP medications in the morning as well as anytime throughout the day if you are having any symptoms  If BP < 110/50 hold BP medications for the next dose.

## 2025-05-13 NOTE — PROGRESS NOTES
"Subjective:   Patient ID:  Lucas Garrison is a 74 y.o. male who presents for evaluation of No chief complaint on file.      HPI 74-year-old male whose current medical conditions include Afib (Failed two PVIs and cardioversion ), cancer, DDD, prostate cancer, HLP, HTN, REGINA, peripheral neuropathy, CHF previously followed in cardiology clinic by Dr. Davalos here today for CV follow-up, seen last week in clinic complaining of shortness of breath fluid overloaded hypotensive in clinic 70s over 50s (was only taking metolazone not Lasix) was sent to the ER, gentle hydration appeared to be on the dry side per chart review.  Here today for hospital follow-up, blood pressure severely elevated 188/127 initially 177/112 repeat. HR elevated low 100s. Took himself off the flecainide a few days ago since he thought the dr said he was doing good, Has not taken any lasix since leaving the ED, now using it as needed.  Here today denies any worsening shortness of breath does have bilateral lower extremity swelling    LH 21' normal coronaries    No results found for: "HGBA1C"    Past Medical History:   Diagnosis Date    atrial fibrillation     Cancer     DDD (degenerative disc disease)     H/O prostate cancer     HLD (hyperlipidemia)     Hypertension     Lumbago     REGINA (obstructive sleep apnea)     Peripheral neuropathy     PTSD (post-traumatic stress disorder)        Past Surgical History:   Procedure Laterality Date    ABLATION OF ARRHYTHMOGENIC FOCUS FOR ATRIAL FIBRILLATION N/A 10/26/2023    Procedure: Ablation atrial fibrillation;  Surgeon: Jacobo Marquez MD;  Location: [facility] EP LAB;  Service: Cardiology;  Laterality: N/A;  afib, PVI, RFA, NATIVIDAD (cx if SR), CHELSI, ALBINA durand, 3 Prep    ABLATION OF ARRHYTHMOGENIC FOCUS FOR ATRIAL FIBRILLATION N/A 4/15/2024    Procedure: Ablation atrial fibrillation;  Surgeon: Jacobo Marquez MD;  Location: [facility] EP LAB;  Service: Cardiology;  Laterality: N/A;  afib, redo PVI,PWI,cti, RFA, NATIVIDAD " (cx if SR), CHELSI, ladarius, MB, 3 Prep    ABLATION, ATRIAL FLUTTER, TYPICAL  4/15/2024    Procedure: Ablation, Atrial Flutter, Typical;  Surgeon: aJcobo Marquez MD;  Location: [facility] EP LAB;  Service: Cardiology;;    CATHETERIZATION OF BOTH LEFT AND RIGHT HEART N/A 6/4/2021    Procedure: CATHETERIZATION, HEART, BOTH LEFT AND RIGHT;  Surgeon: Baldemar Davalos MD;  Location: [facility] CATH LAB;  Service: Cardiology;  Laterality: N/A;    COLONOSCOPY N/A 12/7/2016    Procedure: COLONOSCOPY;  Surgeon: Zeus Guerrier MD;  Location: [facility] ENDO;  Service: Endoscopy;  Laterality: N/A;    COLONOSCOPY N/A 2/22/2021    Procedure: COLONOSCOPY;  Surgeon: Joselyn Castanon MD;  Location: [facility] ENDO;  Service: Endoscopy;  Laterality: N/A;    CORONARY ANGIOGRAPHY N/A 6/4/2021    Procedure: ANGIOGRAM, CORONARY ARTERY;  Surgeon: Baldemar Davalos MD;  Location: [facility] CATH LAB;  Service: Cardiology;  Laterality: N/A;    ECHOCARDIOGRAM,TRANSESOPHAGEAL N/A 10/26/2023    Procedure: Transesophageal echo (NATIVIDAD) intra-procedure log documentation;  Surgeon: Elisha Mac MD;  Location: [facility] EP LAB;  Service: Cardiology;  Laterality: N/A;    ECHOCARDIOGRAM,TRANSESOPHAGEAL N/A 4/15/2024    Procedure: Transesophageal echo (NATIVIDAD) intra-procedure log documentation;  Surgeon: Marie Lan MD;  Location: [facility] EP LAB;  Service: Cardiology;  Laterality: N/A;    prostate radiation seeds  2010    RIGHT HEART CATHETERIZATION Right 6/4/2021    Procedure: INSERTION, CATHETER, RIGHT HEART;  Surgeon: Baldemar Davalos MD;  Location: [facility] CATH LAB;  Service: Cardiology;  Laterality: Right;    TONSILLECTOMY      TRANSESOPHAGEAL ECHOCARDIOGRAM WITH POSSIBLE CARDIOVERSION (NATIVIDAD W/ POSS CARDIOVERSION) N/A 6/20/2023    Procedure: Transesophageal echo (NATIVIDAD) intra-procedure log documentation;  Surgeon: Baldemar Davalos MD;  Location: [facility] CATH LAB;  Service: Cardiology;  Laterality: N/A;    TRANSURETHRAL RESECTION OF PROSTATE      TREATMENT OF CARDIAC  ARRHYTHMIA N/A 8/3/2023    Procedure: Cardioversion or Defibrillation;  Surgeon: Baldemar Davalos MD;  Location: [facility] CATH LAB;  Service: Cardiology;  Laterality: N/A;    TREATMENT OF CARDIAC ARRHYTHMIA  10/26/2023    Procedure: Cardioversion or Defibrillation;  Surgeon: Jacobo Marquez MD;  Location: [facility] EP LAB;  Service: Cardiology;;    TREATMENT OF CARDIAC ARRHYTHMIA  4/15/2024    Procedure: Cardioversion or Defibrillation;  Surgeon: Jacobo Marquez MD;  Location: [facility] EP LAB;  Service: Cardiology;;       Social History[1]    Family History   Problem Relation Name Age of Onset    COPD Mother      Depression Brother      Colon cancer Neg Hx         Medications Ordered Prior to Encounter[2]   Wt Readings from Last 3 Encounters:   05/13/25 (!) 151.7 kg (334 lb 7 oz)   05/05/25 (!) 147.6 kg (325 lb 6.4 oz)   05/05/25 (!) 148.9 kg (328 lb 4.2 oz)     Temp Readings from Last 3 Encounters:   05/05/25 98 °F (36.7 °C) (Oral)   04/16/24 97.6 °F (36.4 °C) (Oral)   10/27/23 98.5 °F (36.9 °C) (Oral)     BP Readings from Last 3 Encounters:   05/13/25 (!) 177/112   05/05/25 (!) 161/94   05/05/25 (!) 75/51     Pulse Readings from Last 3 Encounters:   05/13/25 106   05/05/25 75   05/05/25 80        Review of Systems   Constitutional: Positive for malaise/fatigue.   HENT: Negative.     Eyes: Negative.    Cardiovascular:  Positive for dyspnea on exertion and leg swelling.   Respiratory:  Positive for shortness of breath.    Skin: Negative.    Musculoskeletal: Negative.    Gastrointestinal: Negative.    Genitourinary: Negative.    Neurological:  Positive for weakness.   Psychiatric/Behavioral: Negative.         Objective:   Physical Exam  Vitals and nursing note reviewed.   Constitutional:       Appearance: He is obese.   HENT:      Head: Normocephalic and atraumatic.   Eyes:      General:         Right eye: No discharge.         Left eye: No discharge.      Pupils: Pupils are equal, round, and reactive to light.  "  Cardiovascular:      Rate and Rhythm: Tachycardia present. Rhythm irregular.      Heart sounds: S1 normal and S2 normal. No murmur heard.     No friction rub.   Pulmonary:      Effort: Pulmonary effort is normal. No respiratory distress.      Breath sounds: Normal breath sounds. No rales.   Abdominal:      General: There is distension.      Palpations: Abdomen is soft.      Tenderness: There is no abdominal tenderness.   Musculoskeletal:      Cervical back: Neck supple.      Right lower leg: Edema present.      Left lower leg: Edema present.   Skin:     General: Skin is warm and dry.   Neurological:      General: No focal deficit present.      Mental Status: He is alert and oriented to person, place, and time.      Motor: Weakness present.   Psychiatric:         Mood and Affect: Mood normal.         Behavior: Behavior normal.         Thought Content: Thought content normal.         No results found for: "CHOL"  No results found for: "HDL"  No results found for: "LDLCALC"  No results found for: "TRIG"  No results found for: "CHOLHDL"    Chemistry        Component Value Date/Time     05/05/2025 1600     (H) 04/08/2024 1104    K 3.6 05/05/2025 1600    K 4.1 04/08/2024 1104     05/05/2025 1600     04/08/2024 1104    CO2 27 05/05/2025 1600    CO2 30 (H) 04/08/2024 1104    BUN 20 05/05/2025 1600    CREATININE 1.7 (H) 05/05/2025 1600    GLU 79 05/05/2025 1600     04/08/2024 1104        Component Value Date/Time    CALCIUM 9.2 05/05/2025 1600    CALCIUM 9.2 04/08/2024 1104    ALKPHOS 52 05/05/2025 1600    ALKPHOS 52 (L) 04/06/2021 0729    AST 56 (H) 05/05/2025 1600    AST 32 04/06/2021 0729    ALT 55 (H) 05/05/2025 1600    ALT 45 (H) 04/06/2021 0729    BILITOT 0.7 05/05/2025 1600    BILITOT 0.7 04/06/2021 0729    ESTGFRAFRICA >60.0 01/03/2022 1234    EGFRNONAA >60.0 01/03/2022 1234          No results found for: "TSH"  Lab Results   Component Value Date    INR 1.1 04/08/2024    INR 1.1 " 10/19/2023    INR 1.1 07/27/2023     @RESUFAST(WBC,HGB,HCT,MCV,PLT)  @LABRCNTIP(BNP,BNPTRIAGEBLO)@  CrCl cannot be calculated (Patient's most recent lab result is older than the maximum 7 days allowed.).     Results for orders placed during the hospital encounter of 03/15/21    Echo Color Flow Doppler? Yes    Interpretation Summary  · The left ventricle is mildly enlarged with mild concentric hypertrophy and low normal systolic function. The estimated ejection fraction is 50%  · Normal left ventricular diastolic function.  · There is mild left ventricular global hypokinesis.  · Normal right ventricular size with low normal right ventricular systolic function.  · Mild mitral regurgitation.  · Mild tricuspid regurgitation.  · Normal central venous pressure (3 mmHg).  · The estimated PA systolic pressure is 31 mmHg.     Results for orders placed during the hospital encounter of 05/19/21    Nuclear Stress - Cardiology Interpreted    Interpretation Summary    Abnormal myocardial perfusion scan.    There is a mild intensity, fixed defect consistent with scar in the basal to apical  wall(s).    The gated perfusion images showed an ejection fraction of 72% at rest. The gated perfusion images showed an ejection fraction of 62% post stress. Normal ejection fraction is greater than 54%.    There is mild inferior hypokinesis at rest and stress.    LV cavity size is normal at rest and normal at stress.    The EKG portion of this study is negative for ischemia.    There were no arrhythmias during stress.     Assessment:      1. Paroxysmal atrial fibrillation    2. Acute on chronic diastolic heart failure    3. Hypertension, unspecified type        Plan:   Paroxysmal atrial fibrillation    Acute on chronic diastolic heart failure    Hypertension, unspecified type      Resume Amlodipine at 5mg daily  DC flecainide-patient has not been taking however decides to take it on and off over the last year, EKG remains AFib  Recommend  resuming Lasix 80 mg daily further recs to follow once labs result    Eliquis and bisoprolol- afib  Amlodipine, losartan, bisoprolol, prazosin, low na diet, profile BP- HTN  Bisoprolol lasix and losartan- CHF  RF modifications    Echo  Labs today     1m for BP follow up    Courtney Guillot, FNP-C [facility], Cardiology         [1]   Social History  Tobacco Use    Smoking status: Former     Current packs/day: 0.00     Average packs/day: 1.5 packs/day for 10.0 years (15.0 ttl pk-yrs)     Types: Cigarettes     Start date: 10/30/1972     Quit date: 10/30/1982     Years since quittin.5    Smokeless tobacco: Never   Substance Use Topics    Alcohol use: No    Drug use: No   [2]   Current Outpatient Medications on File Prior to Visit   Medication Sig Dispense Refill    albuterol (PROVENTIL/VENTOLIN HFA) 90 mcg/actuation inhaler INHALE 2 PUFFS BY MOUTH FOUR TIMES A DAY AS NEEDED FOR BRONCHOSPASM PREVENTION      amitriptyline (ELAVIL) 50 MG tablet Take 50 mg by mouth every evening.      apixaban (ELIQUIS) 5 mg Tab Take 1 tablet (5 mg total) by mouth 2 (two) times daily. 180 tablet 3    aspirin (ECOTRIN) 81 MG EC tablet Take 81 mg by mouth once daily.      bisoprolol (ZEBETA) 5 MG tablet Take 5 mg by mouth once daily.      chlorhexidine (HIBICLENS) 4 % external liquid APPLY SMALL AMOUNT TOPICALLY EVERY DAY - USE TO WASH HAIR/SCALP EVERY DAY - THOROUGHLY RINSE THE AREA TO BE CLEANED WITH WATER. APPLY THE MINIMUM AMOUNT OF PRODUCT NECESSARY TO COVER THE SKIN  AND WASH GENTLY.RINSE AGAIN THOROUGHLY      clobetasoL (TEMOVATE) 0.05 % external solution APPLY MODERATE AMOUNT TOPICALLY TWICE A DAY AS NEEDED TO THE SCALP      EScitalopram oxalate (LEXAPRO) 20 MG tablet Take 20 mg by mouth once daily.      furosemide (LASIX) 40 MG tablet Take 2 tablets (80 mg total) by mouth 2 (two) times daily. (Patient taking differently: Take 80 mg by mouth as needed.) 120 tablet 11    gabapentin (NEURONTIN) 600 MG tablet Take 600 mg by mouth as  needed.      loratadine (CLARITIN) 10 mg tablet Take 10 mg by mouth daily as needed.      losartan (COZAAR) 100 MG tablet Take 1 tablet (100 mg total) by mouth once daily. 90 tablet 3    mirtazapine (REMERON) 30 MG tablet Take 30 mg by mouth every evening.      multivitamin capsule Take 1 capsule by mouth once daily.      naproxen (NAPROSYN) 500 MG tablet Take 500 mg by mouth 2 (two) times daily with meals.      omeprazole (PRILOSEC) 20 MG capsule Take 1 capsule (20 mg total) by mouth once daily. 90 capsule 3    pravastatin (PRAVACHOL) 80 MG tablet Take 1 tablet (80 mg total) by mouth once daily. 90 tablet 3    prazosin (MINIPRESS) 5 MG capsule Take 5 mg by mouth 2 (two) times daily.      QUEtiapine (SEROQUEL) 50 MG tablet Take 50 mg by mouth nightly as needed. One-half tablet      traZODone (DESYREL) 50 MG tablet Take 50 mg by mouth nightly as needed.      zolpidem (AMBIEN) 5 MG Tab Take 1 tablet (5 mg total) by mouth nightly as needed (INSOMNIA). (Patient taking differently: Take 10 mg by mouth nightly as needed (INSOMNIA).) 30 tablet 5    fish oil-omega-3 fatty acids 300-1,000 mg capsule Take 1 g by mouth once daily. (Patient not taking: Reported on 5/13/2025)      flecainide (TAMBOCOR) 100 MG Tab Take 100 mg by mouth every 12 (twelve) hours. (Patient not taking: Reported on 5/13/2025)      methocarbamol (ROBAXIN) 750 MG Tab Take 500 mg by mouth 3 (three) times daily. (Patient not taking: Reported on 5/13/2025)       No current facility-administered medications on file prior to visit.

## 2025-05-15 ENCOUNTER — RESULTS FOLLOW-UP (OUTPATIENT)
Dept: CARDIOLOGY | Facility: CLINIC | Age: 75
End: 2025-05-15

## 2025-05-15 ENCOUNTER — TELEPHONE (OUTPATIENT)
Dept: CARDIOLOGY | Facility: CLINIC | Age: 75
End: 2025-05-15
Payer: OTHER GOVERNMENT

## 2025-05-15 NOTE — TELEPHONE ENCOUNTER
----- Message from Mary Anne Art NP sent at 5/15/2025  1:36 PM CDT -----  Heart US stable cont current therapy, waiting on fluid marker to result  ----- Message -----  From: Deacon العراقي MD  Sent: 5/13/2025   4:08 PM CDT  To: Mary Anne Art NP

## (undated) DEVICE — NDL PERCUTANEOUS ENTRYBSDN 18

## (undated) DEVICE — PAD RADI FEMORAL

## (undated) DEVICE — CATH ADVISOR VL CIRC MAP BI-D

## (undated) DEVICE — INTRODUCER HEMOSTASIS 7.5F

## (undated) DEVICE — PACK CATH LAB CUSTOM BR

## (undated) DEVICE — INTRO SWARTZ SL2 8.5FR 63CM

## (undated) DEVICE — COVER PRB TRNSDUC 7.6X183CM

## (undated) DEVICE — KIT PROBE COVER WITH GEL

## (undated) DEVICE — LINE PRESSURE MONITORING 96IN

## (undated) DEVICE — PACK EP DRAPE OMC

## (undated) DEVICE — CATH BIDIRECTIONAL DF CRV 7FR

## (undated) DEVICE — KIT ENSITE ELECTRODE SURFACE

## (undated) DEVICE — BAND TR WITH INFLATOR

## (undated) DEVICE — NDL TRNSSPTL BRK-1 18GA 98CM

## (undated) DEVICE — SET TUBING COOL POINT IRR

## (undated) DEVICE — SHEATH HEMOSTASIS 8.5FR

## (undated) DEVICE — PAD DEFIB CADENCE ADULT R2

## (undated) DEVICE — OMNIPAQUE 300MG 150ML VIAL

## (undated) DEVICE — INTRO FAST-CATH SL1 8.5FR 63CM

## (undated) DEVICE — INTRO AGILIS MED CRL 8.5F 71CM

## (undated) DEVICE — KIT GLIDESHEATH SLEND 6FR 10CM

## (undated) DEVICE — PAD GROUND UNIV STYLE CORD 9IN

## (undated) DEVICE — PAD RADIOLUCENT STAT ADULT

## (undated) DEVICE — WIRE GUIDE TEFLON 3CM .035 145

## (undated) DEVICE — ANGIOTOUCH KIT

## (undated) DEVICE — CATH CV QD LUMN 6FRX110CM

## (undated) DEVICE — R CATH BIDIRECTIONL DF CRV 7FR

## (undated) DEVICE — CATH OPTITORQUE TIG 4.0 100 CM

## (undated) DEVICE — CATH TACTICATH ABLAT BIDIR F-J

## (undated) DEVICE — KIT MANIFOLD LOW PRESS TUBING

## (undated) DEVICE — KIT SYR REUSABLE

## (undated) DEVICE — CATH MAP BI-D HD SENSOR ENABLE

## (undated) DEVICE — NDL TRNSSPTL BRK-1 18GA 71CM

## (undated) DEVICE — R CATH ACUSON ACUNAV 8FR